# Patient Record
Sex: MALE | Race: WHITE | NOT HISPANIC OR LATINO | Employment: OTHER | URBAN - METROPOLITAN AREA
[De-identification: names, ages, dates, MRNs, and addresses within clinical notes are randomized per-mention and may not be internally consistent; named-entity substitution may affect disease eponyms.]

---

## 2017-03-16 ENCOUNTER — RECORDS - HEALTHEAST (OUTPATIENT)
Dept: LAB | Facility: CLINIC | Age: 57
End: 2017-03-16

## 2017-03-16 LAB
CHOLEST SERPL-MCNC: 144 MG/DL
FASTING STATUS PATIENT QL REPORTED: ABNORMAL
HDLC SERPL-MCNC: 30 MG/DL
LDLC SERPL CALC-MCNC: 88 MG/DL
TRIGL SERPL-MCNC: 130 MG/DL

## 2018-01-10 ENCOUNTER — RECORDS - HEALTHEAST (OUTPATIENT)
Dept: LAB | Facility: CLINIC | Age: 58
End: 2018-01-10

## 2018-01-10 LAB
IRON SATN MFR SERPL: 19 % (ref 20–50)
IRON SERPL-MCNC: 55 UG/DL (ref 42–175)
TIBC SERPL-MCNC: 294 UG/DL (ref 313–563)
TRANSFERRIN SERPL-MCNC: 236 MG/DL (ref 212–360)
VIT B12 SERPL-MCNC: 608 PG/ML (ref 213–816)

## 2018-01-11 LAB
BASOPHILS # BLD AUTO: 0 THOU/UL (ref 0–0.2)
BASOPHILS NFR BLD AUTO: 1 % (ref 0–2)
EOSINOPHIL # BLD AUTO: 0.3 THOU/UL (ref 0–0.4)
EOSINOPHIL NFR BLD AUTO: 5 % (ref 0–6)
ERYTHROCYTE [DISTWIDTH] IN BLOOD BY AUTOMATED COUNT: 12.1 % (ref 11–14.5)
HCT VFR BLD AUTO: 39.2 % (ref 40–54)
HGB BLD-MCNC: 13.2 G/DL (ref 14–18)
LAB AP CHARGES (HE HISTORICAL CONVERSION): NORMAL
LYMPHOCYTES # BLD AUTO: 1.3 THOU/UL (ref 0.8–4.4)
LYMPHOCYTES NFR BLD AUTO: 21 % (ref 20–40)
MCH RBC QN AUTO: 30.6 PG (ref 27–34)
MCHC RBC AUTO-ENTMCNC: 33.7 G/DL (ref 32–36)
MCV RBC AUTO: 91 FL (ref 80–100)
MONOCYTES # BLD AUTO: 0.8 THOU/UL (ref 0–0.9)
MONOCYTES NFR BLD AUTO: 13 % (ref 2–10)
NEUTROPHILS # BLD AUTO: 3.7 THOU/UL (ref 2–7.7)
NEUTROPHILS NFR BLD AUTO: 61 % (ref 50–70)
PATH REPORT.COMMENTS IMP SPEC: NORMAL
PATH REPORT.COMMENTS IMP SPEC: NORMAL
PATH REPORT.FINAL DX SPEC: NORMAL
PATH REPORT.MICROSCOPIC SPEC OTHER STN: ABNORMAL
PATH REPORT.MICROSCOPIC SPEC OTHER STN: NORMAL
PATH REPORT.RELEVANT HX SPEC: NORMAL
PLATELET # BLD AUTO: 238 THOU/UL (ref 140–440)
PMV BLD AUTO: 9.7 FL (ref 8.5–12.5)
RBC # BLD AUTO: 4.31 MILL/UL (ref 4.4–6.2)
WBC: 6.2 THOU/UL (ref 4–11)

## 2018-09-11 ENCOUNTER — RECORDS - HEALTHEAST (OUTPATIENT)
Dept: LAB | Facility: CLINIC | Age: 58
End: 2018-09-11

## 2018-09-11 LAB
ALBUMIN SERPL-MCNC: 4.2 G/DL (ref 3.5–5)
ALP SERPL-CCNC: 41 U/L (ref 45–120)
ALT SERPL W P-5'-P-CCNC: 15 U/L (ref 0–45)
ANION GAP SERPL CALCULATED.3IONS-SCNC: 8 MMOL/L (ref 5–18)
AST SERPL W P-5'-P-CCNC: 18 U/L (ref 0–40)
BILIRUB SERPL-MCNC: 0.4 MG/DL (ref 0–1)
BUN SERPL-MCNC: 10 MG/DL (ref 8–22)
CALCIUM SERPL-MCNC: 9.2 MG/DL (ref 8.5–10.5)
CHLORIDE BLD-SCNC: 104 MMOL/L (ref 98–107)
CO2 SERPL-SCNC: 26 MMOL/L (ref 22–31)
CREAT SERPL-MCNC: 0.97 MG/DL (ref 0.7–1.3)
GFR SERPL CREATININE-BSD FRML MDRD: >60 ML/MIN/1.73M2
GLUCOSE BLD-MCNC: 97 MG/DL (ref 70–125)
POTASSIUM BLD-SCNC: 4.8 MMOL/L (ref 3.5–5)
PROT SERPL-MCNC: 6.6 G/DL (ref 6–8)
PSA SERPL-MCNC: 0.3 NG/ML (ref 0–3.5)
SODIUM SERPL-SCNC: 138 MMOL/L (ref 136–145)

## 2019-09-25 ENCOUNTER — RECORDS - HEALTHEAST (OUTPATIENT)
Dept: LAB | Facility: CLINIC | Age: 59
End: 2019-09-25

## 2019-09-25 LAB
ALBUMIN SERPL-MCNC: 4 G/DL (ref 3.5–5)
ALP SERPL-CCNC: 65 U/L (ref 45–120)
ALT SERPL W P-5'-P-CCNC: 14 U/L (ref 0–45)
ANION GAP SERPL CALCULATED.3IONS-SCNC: 5 MMOL/L (ref 5–18)
AST SERPL W P-5'-P-CCNC: 17 U/L (ref 0–40)
BILIRUB SERPL-MCNC: 0.2 MG/DL (ref 0–1)
BUN SERPL-MCNC: 11 MG/DL (ref 8–22)
CALCIUM SERPL-MCNC: 9.8 MG/DL (ref 8.5–10.5)
CHLORIDE BLD-SCNC: 102 MMOL/L (ref 98–107)
CHOLEST SERPL-MCNC: 154 MG/DL
CO2 SERPL-SCNC: 29 MMOL/L (ref 22–31)
CREAT SERPL-MCNC: 0.93 MG/DL (ref 0.7–1.3)
FASTING STATUS PATIENT QL REPORTED: YES
GFR SERPL CREATININE-BSD FRML MDRD: >60 ML/MIN/1.73M2
GLUCOSE BLD-MCNC: 104 MG/DL (ref 70–125)
HDLC SERPL-MCNC: 35 MG/DL
LDLC SERPL CALC-MCNC: 95 MG/DL
POTASSIUM BLD-SCNC: 4.5 MMOL/L (ref 3.5–5)
PROT SERPL-MCNC: 6.4 G/DL (ref 6–8)
SODIUM SERPL-SCNC: 136 MMOL/L (ref 136–145)
TRIGL SERPL-MCNC: 118 MG/DL

## 2020-02-19 ENCOUNTER — AMBULATORY - HEALTHEAST (OUTPATIENT)
Dept: CARDIOLOGY | Facility: CLINIC | Age: 60
End: 2020-02-19

## 2020-09-15 ENCOUNTER — RECORDS - HEALTHEAST (OUTPATIENT)
Dept: LAB | Facility: CLINIC | Age: 60
End: 2020-09-15

## 2020-09-15 LAB
ALBUMIN SERPL-MCNC: 4.5 G/DL (ref 3.5–5)
ALP SERPL-CCNC: 57 U/L (ref 45–120)
ALT SERPL W P-5'-P-CCNC: 18 U/L (ref 0–45)
ANION GAP SERPL CALCULATED.3IONS-SCNC: 8 MMOL/L (ref 5–18)
AST SERPL W P-5'-P-CCNC: 18 U/L (ref 0–40)
BILIRUB SERPL-MCNC: 0.2 MG/DL (ref 0–1)
BUN SERPL-MCNC: 11 MG/DL (ref 8–22)
CALCIUM SERPL-MCNC: 9.4 MG/DL (ref 8.5–10.5)
CHLORIDE BLD-SCNC: 101 MMOL/L (ref 98–107)
CHOLEST SERPL-MCNC: 154 MG/DL
CO2 SERPL-SCNC: 28 MMOL/L (ref 22–31)
CREAT SERPL-MCNC: 1.06 MG/DL (ref 0.7–1.3)
FASTING STATUS PATIENT QL REPORTED: ABNORMAL
GFR SERPL CREATININE-BSD FRML MDRD: >60 ML/MIN/1.73M2
GLUCOSE BLD-MCNC: 75 MG/DL (ref 70–125)
HDLC SERPL-MCNC: 39 MG/DL
LDLC SERPL CALC-MCNC: 83 MG/DL
POTASSIUM BLD-SCNC: 4.7 MMOL/L (ref 3.5–5)
PROT SERPL-MCNC: 6.8 G/DL (ref 6–8)
SODIUM SERPL-SCNC: 137 MMOL/L (ref 136–145)
TRIGL SERPL-MCNC: 158 MG/DL

## 2021-03-04 ENCOUNTER — RECORDS - HEALTHEAST (OUTPATIENT)
Dept: ADMINISTRATIVE | Facility: OTHER | Age: 61
End: 2021-03-04

## 2021-04-12 ENCOUNTER — HOSPITAL ENCOUNTER (OUTPATIENT)
Dept: RESPIRATORY THERAPY | Facility: CLINIC | Age: 61
Discharge: HOME | End: 2021-04-12
Attending: FAMILY MEDICINE

## 2021-04-12 DIAGNOSIS — R06.02 SOB (SHORTNESS OF BREATH): ICD-10-CM

## 2021-04-12 LAB — HGB BLD-MCNC: 13.6 G/DL (ref 14–18)

## 2021-05-07 ENCOUNTER — RECORDS - HEALTHEAST (OUTPATIENT)
Dept: ADMINISTRATIVE | Facility: OTHER | Age: 61
End: 2021-05-07

## 2021-05-20 ENCOUNTER — RECORDS - HEALTHEAST (OUTPATIENT)
Dept: ADMINISTRATIVE | Facility: OTHER | Age: 61
End: 2021-05-20

## 2021-05-21 ENCOUNTER — RECORDS - HEALTHEAST (OUTPATIENT)
Dept: ADMINISTRATIVE | Facility: OTHER | Age: 61
End: 2021-05-21

## 2021-05-25 ENCOUNTER — RECORDS - HEALTHEAST (OUTPATIENT)
Dept: ADMINISTRATIVE | Facility: CLINIC | Age: 61
End: 2021-05-25

## 2021-05-26 ENCOUNTER — RECORDS - HEALTHEAST (OUTPATIENT)
Dept: ADMINISTRATIVE | Facility: CLINIC | Age: 61
End: 2021-05-26

## 2021-05-27 ENCOUNTER — RECORDS - HEALTHEAST (OUTPATIENT)
Dept: ADMINISTRATIVE | Facility: CLINIC | Age: 61
End: 2021-05-27

## 2021-05-27 ENCOUNTER — OFFICE VISIT - HEALTHEAST (OUTPATIENT)
Dept: PULMONOLOGY | Facility: OTHER | Age: 61
End: 2021-05-27

## 2021-05-27 DIAGNOSIS — J44.9 CHRONIC OBSTRUCTIVE PULMONARY DISEASE, UNSPECIFIED COPD TYPE (H): ICD-10-CM

## 2021-05-27 DIAGNOSIS — Z12.2 ENCOUNTER FOR SCREENING FOR LUNG CANCER: ICD-10-CM

## 2021-05-27 ASSESSMENT — MIFFLIN-ST. JEOR: SCORE: 1848.18

## 2021-05-28 ENCOUNTER — RECORDS - HEALTHEAST (OUTPATIENT)
Dept: ADMINISTRATIVE | Facility: CLINIC | Age: 61
End: 2021-05-28

## 2021-05-29 ENCOUNTER — RECORDS - HEALTHEAST (OUTPATIENT)
Dept: ADMINISTRATIVE | Facility: CLINIC | Age: 61
End: 2021-05-29

## 2021-05-30 ENCOUNTER — RECORDS - HEALTHEAST (OUTPATIENT)
Dept: ADMINISTRATIVE | Facility: CLINIC | Age: 61
End: 2021-05-30

## 2021-05-31 ENCOUNTER — RECORDS - HEALTHEAST (OUTPATIENT)
Dept: ADMINISTRATIVE | Facility: CLINIC | Age: 61
End: 2021-05-31

## 2021-06-01 ENCOUNTER — RECORDS - HEALTHEAST (OUTPATIENT)
Dept: ADMINISTRATIVE | Facility: CLINIC | Age: 61
End: 2021-06-01

## 2021-06-06 NOTE — PROGRESS NOTES
I was asked by Dr. Funez to review EKGs with consideration of Brugada syndrome  Abnormal EKG since at least 2002 with some ST elevation in V1 consistent with Brugada syndrome  EKG pattern is not changed much over the years and is seen on stress scan  Stress test shows no ischemia or infarct with preserved ejection fraction without arrhythmia   unless patient has a history of arrhythmic like syncope or a worrisome family history of early sudden death would not be very concerned about this EKG pattern

## 2021-06-14 ENCOUNTER — HOSPITAL ENCOUNTER (OUTPATIENT)
Dept: CT IMAGING | Facility: HOSPITAL | Age: 61
Discharge: HOME | End: 2021-06-14
Attending: INTERNAL MEDICINE
Payer: COMMERCIAL

## 2021-06-14 DIAGNOSIS — Z12.2 ENCOUNTER FOR SCREENING FOR LUNG CANCER: ICD-10-CM

## 2021-06-16 NOTE — PROGRESS NOTES
RESPIRATORY CARE NOTE     Patient Name: Alex Frost  Today's Date: 4/12/2021     Complete PFT done. Pt performed tests with good effort. Alb neb given.  Test results meet ATS criteria. Results scanned into epic. Pt left in no distress.       Shannon Teixeira, LRT

## 2021-06-17 ENCOUNTER — COMMUNICATION - HEALTHEAST (OUTPATIENT)
Dept: PULMONOLOGY | Facility: OTHER | Age: 61
End: 2021-06-17

## 2021-06-26 NOTE — TELEPHONE ENCOUNTER
Telephone call to patient to relay LDCT results per Dr. Whitfield,   There are no findings concerning for lung cancer. We can continue to discuss annual screening CT on the next follow up in a year   Provided clinic call back number for any further needs or questions.

## 2021-06-26 NOTE — PATIENT INSTRUCTIONS - HE
Plan:    Continue Trelegy inhaler daily. Remember to gargle & rinse your mouth and throat after using it.    Try using your albuterol inhaler about 10 minutes before Trelegy in the morning to see if it helps a bit more with your morning shortness of breath.    We will get a lung CT scan to screen for lung cancer.     You should follow up in 12 months.     If you have any questions or concerns, please, call our clinic at [phone].

## 2021-07-04 NOTE — PROGRESS NOTES
"Progress Notes by Zahra Whitfield MD at 5/27/2021  9:00 AM     Author: Zahra Whitfield MD Service: -- Author Type: Physician    Filed: 5/27/2021  9:41 AM Encounter Date: 5/27/2021 Status: Signed    : Zahra Whitfield MD (Physician)       Pulmonary Clinic Outpatient Consultation  5/26/2021     Assessment and Plan:   #. COPD w/ moderately severe airflow obstruction & air trapping. Started on appropriate triple-inhaler maintenance regimen.  #. Tobacco use disorder, ongoing.  #. Lung cancer screening -- appropriate.      Continue w/ Trelegy    Advised to try using albuterol before Trelegy in the morning to see if it helps w/ his morning dyspnea    Discussed the effect of opioids on breathing & air hunger    Discussed smoking cessation    Reviewed PFTs    Lung cancer screening CT ordered    RTC 12 months.    Zahra Whitfield MD  Pulmonary and Critical Care   ______________________________________________________________________________    CC: dyspnea    HPI:   Alex Frost is a 60 y.o. smoker with history of MDD, chronic pain, GERD, SUDARSHAN, presenting today for evaluation of dyspnea. PFT consistent w/ moderately severe obstruction w/ air-trapping. Current inhaler regimen -- albuterol & just started Trelegy.    Reports that he can't breathe in the morning until he he \"gets enough medications [opioid] in\". He has been taking Trelegy for about 2 weeks. No cough, no wheezing. He has dyspnea w/ some moderate activity such as going up 1-2 flights of stairs -- this has improved since starting Trelegy. He is slower ambulating on a flat surface as well, but there is also back pain that is contributing. He has no nocturnal breathing issues. He gets annual bout of bronchitis in the fall -- he recovers w/in a couple of days of antibiotics. No h/o hemoptysis. He has seasonal allergies (spring primarily); he sometimes takes Flonase. He has 3 cats & these do not contribute to allergy " "symptoms or dyspnea. No childhood respiratory issues.     No occasionally smokes a cigar nowadays. He has 30 pack year history w/ cigarettes. His sister had a brain tumor. His father  w/ lung cancer at age 77. His mother  w/ emphysema.     Lung Cancer Screening criteria:    Age 55-80    30+ pack-year smoking history    Smoking within past 15 years    No severe medical issues that would cause death before death due to lung cancer that may be detected    Background: Of 100 patients who get screening, 25 will have a positive scan. Of those 25, only 1 will have cancer.  Overdiagnosis is estimated at 10% of patients-- they would not have been detected in the patient's lifetime without screening. Less than 1% of patients likely had death related to radiation exposure increase.   Average low-dose CT associated with 0.61 to 1.5 mSv. Annual background radiation exposure in the [place] averages 2.4 mSv; mammogram is 0.7mSv.    Benefits: reduction in risk of death from lung cancer. The number needed to treat is 320 (for every 320 patients who undergo screening, 1 patient will have a benefit in mortality from early detection from the screening).    Screening may lead to detection of nodules that may or may not be cancer.  Some of these nodules would need to be monitored with serial/repeat CT scans.  Larger nodules may need to be biopsied AND/OR resected as part of treatment.    ROS: 10 point ROS reviewed and noted to be negative w/ exceptions as detailed in the HPI.    Physical Exam:  /68   Pulse 64   Ht 5' 11\" (1.803 m)   Wt (!) 225 lb 1.6 oz (102.1 kg)   SpO2 97% Comment: RA  BMI 31.40 kg/m    Gen: alert, oriented, no distress  HEENT: masked, PERRL; no stridor  CV: RRR, no M/G/R  Resp: equal bilateral air entry, breath sounds clear throughout, no focal crackles or wheezes; able to converse in full sentences w/ no respiratory distress  Abd: soft, nontender, no palpable organomegaly  Skin: no apparent " rashes  Ext: no cyanosis, no clubbing, no BLE edema  Neuro: alert, nonfocal    Labs: personally reviewed in the EMR.  Imaging studies: personally reviewed and interpreted. Below are the Radiology interpretations.  #. Cxr, 2/18/201: negative chest    PFT's (4/12/21): moderately severe obstruction without BD response, no hyper-inflation, (+) air trapping, & normal diffusion capacity.       PMH:  Patient Active Problem List    Diagnosis Date Noted   ? Abnormal reflex 05/21/2021   ? Disturbance of skin sensation 05/21/2021   ? Drug induced constipation 05/21/2021   ? Long term (current) use of opiate analgesic 05/21/2021   ? Male hypogonadism 05/21/2021   ? Other myositis, unspecified site 05/21/2021   ? Recurrent major depression in full remission (H) 05/21/2021   ? Smoking 05/21/2021   ? Trochanteric bursitis, right hip 05/21/2021   ? Hyponatremia 02/19/2020   ? Abnormal electrocardiogram 02/19/2020   ? Gastro-esophageal reflux disease without esophagitis 02/18/2020   ? Low back pain 02/18/2020   ? Mixed hyperlipidemia 02/18/2020   ? Chest pressure 02/18/2020   ? Obstructive sleep apnea 11/22/2016   ? Abnormal chest CT 11/24/2015   ? Major depressive disorder, recurrent, severe with psychotic features (H) 10/07/2015   ? Anxiety disorder 10/07/2015   ? Benign essential tremor 10/07/2015   ? Episodic mood disorder (H) 10/07/2015   ? Essential and other specified forms of tremor 05/27/2015   ? Hallucinations 05/27/2015   ? Muscle pain 05/27/2015   ? Sensory disturbance 05/27/2015   ? Tired 05/27/2015   ? Arthropathy involving other sites 01/08/2015   ? Encounter for long-term (current) use of other medications 11/25/2014   ? Major depressive disorder, single episode 05/21/2014   ? Migraines 06/03/2010   ? DDD (degenerative disc disease), lumbar 07/29/2009   ? Arthropathy of lumbar facet joint 07/29/2009   ? Intra-abdominal hernia 06/30/2009   ? Benign essential hypertension 06/29/2009   ? Brachial neuritis or radiculitis  06/29/2009   ? Trigeminal neuralgia 06/29/2009   ? Impacted cerumen 06/29/2009   ? Insomnia 06/29/2009   ? Malaise and fatigue 06/29/2009   ? Plantar fascial fibromatosis 06/29/2009   ? Rosacea 06/29/2009       PSH:  No past surgical history on file.    Family HX: family history is not on file.    Social Hx:  reports that he has been smoking cigars. He has a 0.13 pack-year smoking history. He has quit using smokeless tobacco. He reports previous alcohol use.     Current Meds:  Current Outpatient Medications   Medication Sig Dispense Refill   ? albuterol (PROAIR HFA;PROVENTIL HFA;VENTOLIN HFA) 90 mcg/actuation inhaler INHALE 2 PUFFS PO QID     ? amoxicillin-clavulanate (AUGMENTIN) 875-125 mg per tablet TK 1 T PO Q 12 H FOR 7 DAYS     ? atorvastatin (LIPITOR) 20 MG tablet Take 20 mg by mouth at bedtime.   0   ? candesartan (ATACAND) 32 MG tablet Take 16 mg by mouth daily.  1   ? cholecalciferol, vitamin D3, 1,000 unit (25 mcg) tablet Take 2,000 Units by mouth daily.     ? clonazePAM (KLONOPIN) 1 MG tablet Take 1 mg by mouth 2 (two) times a day.   1   ? DOCOSAHEXANOIC ACID/EPA (FISH OIL ORAL) Take 1 capsule by mouth daily. Med Name: Gurjit Red     ? famotidine (PEPCID) 20 MG tablet Take 20 mg by mouth 2 (two) times a day.     ? lubiprostone (AMITIZA) 24 MCG capsule Take 24 mcg by mouth 2 (two) times a day with meals.     ? methocarbamoL (ROBAXIN) 500 MG tablet Take 500 mg by mouth 3 (three) times a day as needed.     ? morphine (MS CONTIN) 30 MG 12 hr tablet Take 30 mg by mouth 3 (three) times a day.     ? MOVANTIK 25 mg Tab tablet TAKE 1 TABLET BY MOUTH EVERY DAY IN THE MORNING     ? multivitamin therapeutic tablet Take 1 tablet by mouth daily.     ? olopatadine 0.6 % Spry 2 sprays.     ? oxyCODONE (ROXICODONE) 15 MG immediate release tablet Take 15 mg by mouth every 4 (four) hours as needed. Max 5 tablets per day  0   ? PARoxetine (PAXIL-CR) 12.5 MG 24 hr tablet Take 12.5 mg by mouth every morning. Take with 37.5mg  tablet for total of 50mg daily     ? PARoxetine (PAXIL-CR) 37.5 MG 24 hr tablet Take 37.5 mg by mouth daily. Take with 12.5mg tablet for total of 50mg daily  3   ? propranolol (INDERAL) 10 MG tablet Take 10 mg by mouth 2 (two) times a day.   3   ? risperiDONE (RISPERDAL) 1 MG tablet Take 1 mg by mouth at bedtime.  3     No current facility-administered medications for this visit.        Allergies:  Allergies   Allergen Reactions   ? Amitriptyline Other (See Comments)     tachycardia   ? Quinolones Other (See Comments)     irreg heartbeat   ? Lyrica [Pregabalin] Rash   ? Neurontin [Gabapentin] Rash

## 2021-07-06 VITALS
WEIGHT: 225.1 LBS | DIASTOLIC BLOOD PRESSURE: 68 MMHG | HEIGHT: 71 IN | OXYGEN SATURATION: 97 % | SYSTOLIC BLOOD PRESSURE: 112 MMHG | HEART RATE: 64 BPM | BODY MASS INDEX: 31.51 KG/M2

## 2021-11-10 ENCOUNTER — LAB REQUISITION (OUTPATIENT)
Dept: LAB | Facility: CLINIC | Age: 61
End: 2021-11-10

## 2021-11-10 DIAGNOSIS — E78.2 MIXED HYPERLIPIDEMIA: ICD-10-CM

## 2021-11-10 DIAGNOSIS — Z12.5 ENCOUNTER FOR SCREENING FOR MALIGNANT NEOPLASM OF PROSTATE: ICD-10-CM

## 2021-11-10 LAB
ALBUMIN SERPL-MCNC: 4.3 G/DL (ref 3.5–5)
ALP SERPL-CCNC: 74 U/L (ref 45–120)
ALT SERPL W P-5'-P-CCNC: 23 U/L (ref 0–45)
ANION GAP SERPL CALCULATED.3IONS-SCNC: 11 MMOL/L (ref 5–18)
AST SERPL W P-5'-P-CCNC: 21 U/L (ref 0–40)
BILIRUB SERPL-MCNC: 0.4 MG/DL (ref 0–1)
BUN SERPL-MCNC: 8 MG/DL (ref 8–22)
CALCIUM SERPL-MCNC: 9.6 MG/DL (ref 8.5–10.5)
CHLORIDE BLD-SCNC: 104 MMOL/L (ref 98–107)
CHOLEST SERPL-MCNC: 168 MG/DL
CO2 SERPL-SCNC: 24 MMOL/L (ref 22–31)
CREAT SERPL-MCNC: 0.83 MG/DL (ref 0.7–1.3)
FASTING STATUS PATIENT QL REPORTED: NORMAL
GFR SERPL CREATININE-BSD FRML MDRD: >90 ML/MIN/1.73M2
GLUCOSE BLD-MCNC: 98 MG/DL (ref 70–125)
HDLC SERPL-MCNC: 42 MG/DL
LDLC SERPL CALC-MCNC: 107 MG/DL
POTASSIUM BLD-SCNC: 4.7 MMOL/L (ref 3.5–5)
PROT SERPL-MCNC: 6.9 G/DL (ref 6–8)
PSA SERPL-MCNC: 0.17 UG/L (ref 0–4.5)
SODIUM SERPL-SCNC: 139 MMOL/L (ref 136–145)
TRIGL SERPL-MCNC: 93 MG/DL

## 2021-11-10 PROCEDURE — G0103 PSA SCREENING: HCPCS | Performed by: FAMILY MEDICINE

## 2021-11-10 PROCEDURE — 80061 LIPID PANEL: CPT | Performed by: FAMILY MEDICINE

## 2021-11-10 PROCEDURE — 82040 ASSAY OF SERUM ALBUMIN: CPT | Performed by: FAMILY MEDICINE

## 2022-03-19 DIAGNOSIS — J44.9 COPD (CHRONIC OBSTRUCTIVE PULMONARY DISEASE) (H): Primary | ICD-10-CM

## 2022-07-14 PROBLEM — E87.1 HYPONATREMIA: Status: ACTIVE | Noted: 2020-02-19

## 2022-07-14 PROBLEM — Z79.891 LONG TERM (CURRENT) USE OF OPIATE ANALGESIC: Status: ACTIVE | Noted: 2021-05-21

## 2022-07-14 PROBLEM — E29.1 MALE HYPOGONADISM: Status: ACTIVE | Noted: 2021-05-21

## 2022-07-14 PROBLEM — F17.200 SMOKING: Status: ACTIVE | Noted: 2021-05-21

## 2022-07-14 PROBLEM — R20.9 DISTURBANCE OF SKIN SENSATION: Status: ACTIVE | Noted: 2021-05-21

## 2022-07-14 PROBLEM — F33.42 RECURRENT MAJOR DEPRESSION IN FULL REMISSION (H): Status: ACTIVE | Noted: 2021-05-21

## 2022-07-14 PROBLEM — M60.80: Status: ACTIVE | Noted: 2021-05-21

## 2022-07-14 PROBLEM — E78.2 MIXED HYPERLIPIDEMIA: Status: ACTIVE | Noted: 2020-02-18

## 2022-07-14 PROBLEM — M70.61 TROCHANTERIC BURSITIS, RIGHT HIP: Status: ACTIVE | Noted: 2021-05-21

## 2022-07-14 PROBLEM — M54.50 LOW BACK PAIN: Status: ACTIVE | Noted: 2020-02-18

## 2022-07-14 PROBLEM — K59.03 DRUG INDUCED CONSTIPATION: Status: ACTIVE | Noted: 2021-05-21

## 2022-07-14 PROBLEM — K21.9 GASTRO-ESOPHAGEAL REFLUX DISEASE WITHOUT ESOPHAGITIS: Status: ACTIVE | Noted: 2020-02-18

## 2022-07-14 PROBLEM — R07.89 CHEST PRESSURE: Status: ACTIVE | Noted: 2020-02-18

## 2022-07-14 PROBLEM — R94.31 ABNORMAL ELECTROCARDIOGRAM: Status: ACTIVE | Noted: 2020-02-19

## 2022-07-14 PROBLEM — R29.2 ABNORMAL REFLEX: Status: ACTIVE | Noted: 2021-05-21

## 2022-07-15 NOTE — PROGRESS NOTES
"Pulmonary Clinic Outpatient Follow-Up  7/19/2022     Assessment and Plan:   #. COPD, moderately severe airflow obstruction and air trapping. Doing well on Trelegy.  #. Lung cancer screening. Last done in 06/2021 -- declined testing this year, OK to readdress in 2023.   #. Tobacco use disorder, in remission. Chantix has been helpful. Smoked from 1980 to 2022.       Continue with daily Trelegy    Provided with an Rx for a rescue albuterol    Action Plan -- prednisone 40 mg daily x 5 days with a Z-pack. May repeat x1, but if fails 2 action plan activations back-to-back, will need to go the the ED.    RTC: 12 months    Zahra Whitfield MD  Pulmonary and Critical Care   ______________________________________________________________________________    CC: follow up    HPI:   Alex Frost is a 61 year old smoker with history of COPD, MDD, chronic pain, GERD, SUDARSHAN, presenting today for follow up of his COPD.  Last seen 5/26/2021; continued on Trelegy, discussed smoking cessation, ordered lung cancer screening. LDCT 6/14/2021 lung RADS category 1 (negative).    Reports that he is doing well. He is using daily Trelegy and feels that it has definitely been helpful for his breathing. Not affected by weather changes, but feels that changes in the barometric pressure (low) have a negative impact on his breathing. No ED visits or hospitalizations for his breathing is the last year. He gets some transient worsening in his breathing lasting for 15-30 minutes that he calls \"flares\" -- he either takes a drive to take his mind of things or just \"rides it out\". He does not have an albuterol inhaler currently -- not sure why.     Lung Cancer Screening criteria:    Age 55-80    30+ pack-year smoking history    Smoking within past 15 years    No severe medical issues that would cause death before death due to lung cancer that may be detected    Background: Of 100 patients who get screening, 25 will have a positive scan. Of those 25, only 1 will " have cancer.  Overdiagnosis is estimated at 10% of patients-- they would not have been detected in the patient's lifetime without screening. Less than 1% of patients likely had death related to radiation exposure increase.   Average low-dose CT associated with 0.61 to 1.5 mSv. Annual background radiation exposure in the [place] averages 2.4 mSv; mammogram is 0.7mSv.    Benefits: reduction in risk of death from lung cancer. The number needed to treat is 320 (for every 320 patients who undergo screening, 1 patient will have a benefit in mortality from early detection from the screening).    Screening may lead to detection of nodules that may or may not be cancer.  Some of these nodules would need to be monitored with serial/repeat CT scans.  Larger nodules may need to be biopsied AND/OR resected as part of treatment.    REVIEW OF SYSTEMS: 10-point ROS was negative with exceptions as detailed in the HPI.    Physical Exam:  /64 (BP Location: Left arm, Patient Position: Chair, Cuff Size: Adult Regular)   Pulse 53   Wt 97.2 kg (214 lb 4.8 oz)   SpO2 96%   BMI 29.89 kg/m    Gen: alert, oriented, no distress  HEENT: masked, PERLL; no stridor  CV: RRR, no M/G/R  Resp: equal bilateral air entry, breath sounds clear throughout, no focal crackles or wheezes. Talking in full sentences w/ no respiratory distress  Abd: deferred  Skin: no apparent rashes  Ext: no cyanosis, clubbing or edema  Neuro: alert, nonfocal    Labs: personally reviewed & interpreted in EMR.   Imaging & Procedures: personally reviewed & interpreted, including formal Radiology reports in the EMR.  PFT's (4/12/21): moderately severe obstruction without BD response, no hyper-inflation, (+) air trapping, & normal diffusion capacity.          MEDICAL HISTORY:  has a past medical history of Anxiety, Cigar smoker, GERD (gastroesophageal reflux disease), Hyperlipidemia, and Hypertension.  SURGICAL HISTORY:  has a past surgical history that includes IR  Lumbar Epidural Steroid Injection (10/9/2002).  SOCIAL HISTORY:  reports that he has quit smoking. His smoking use included cigars. He has a 0.13 pack-year smoking history. He has quit using smokeless tobacco. He reports previous alcohol use.  FAMILY HISTORY: family history is not on file.    MEDICATIONS: personally reviewed, including EMR/Care Everywhere. Pertinent information noted & updated.   ALLERGIES:   Allergies   Allergen Reactions     Amitriptyline      Reaction: Racing heart (tachycardia)      Avelox      Reaction: Anxiety     Lisinopril      Reaction: Facial Parathesia (numbness)     Lyrica [Pregabalin]      Reaction: Facial Parathesia (numbness)      Neurontin [Gabapentin]      Reaction: Rash and itching     Lung Cancer Screening Shared Decision Making Visit     Alex Frost is eligible for lung cancer screening on the basis of:   has not not experienced symptoms suggestive of lung cancer.   born on 1960, 61 year old years old.   smoked for a total of 30 pack-years   has quit smoking w/in the last year    I have discussed with patient the risks and benefits of screening for lung cancer with low-dose CT.     The risks include:   radiation exposure    false positives     over-diagnosis    The benefit of early detection of lung cancer is contingent upon adherence to annual screening or more frequent follow up if indicated.     Furthermore, reaping the benefits of screening requires Alex Frost to be willing and able to undergo diagnostic procedures, if indicated.     We did discuss that the only way to prevent lung cancer is to not smoke. Smoking cessation assistance was offered.

## 2022-07-19 ENCOUNTER — OFFICE VISIT (OUTPATIENT)
Dept: PULMONOLOGY | Facility: OTHER | Age: 62
End: 2022-07-19
Payer: COMMERCIAL

## 2022-07-19 VITALS
HEART RATE: 53 BPM | BODY MASS INDEX: 29.89 KG/M2 | OXYGEN SATURATION: 96 % | WEIGHT: 214.3 LBS | SYSTOLIC BLOOD PRESSURE: 114 MMHG | DIASTOLIC BLOOD PRESSURE: 64 MMHG

## 2022-07-19 DIAGNOSIS — J44.9 CHRONIC OBSTRUCTIVE PULMONARY DISEASE, UNSPECIFIED COPD TYPE (H): Primary | ICD-10-CM

## 2022-07-19 PROCEDURE — 99213 OFFICE O/P EST LOW 20 MIN: CPT | Performed by: INTERNAL MEDICINE

## 2022-07-19 RX ORDER — ALBUTEROL SULFATE 90 UG/1
2 AEROSOL, METERED RESPIRATORY (INHALATION) EVERY 6 HOURS PRN
Qty: 18 G | Refills: 3 | Status: SHIPPED | OUTPATIENT
Start: 2022-07-19 | End: 2022-10-24

## 2022-07-19 NOTE — PATIENT INSTRUCTIONS
Plan:  Continue with your daily Trelegy  When you are having an attack or flare from your COPD, I want you to use a rescue ALBUTEROL inhaler to help you catch your breath sooner. You can use this inhaler (1-2 puffs) up to every 6 hours, if you need that. The effects of this medication do not last long, but can be very helpful during flares    You should follow up in 12 months -- we will discuss lung cancer screening CT scan at that time.  Congratulations on quitting smoking!     If you have any questions or concerns, please, call our clinic at [phone].

## 2022-10-24 DIAGNOSIS — J44.9 CHRONIC OBSTRUCTIVE PULMONARY DISEASE, UNSPECIFIED COPD TYPE (H): ICD-10-CM

## 2022-10-24 RX ORDER — ALBUTEROL SULFATE 90 UG/1
AEROSOL, METERED RESPIRATORY (INHALATION)
Qty: 8.5 G | Refills: 11 | Status: SHIPPED | OUTPATIENT
Start: 2022-10-24 | End: 2024-03-12

## 2022-11-02 ENCOUNTER — LAB REQUISITION (OUTPATIENT)
Dept: LAB | Facility: CLINIC | Age: 62
End: 2022-11-02

## 2022-11-02 DIAGNOSIS — Z12.5 ENCOUNTER FOR SCREENING FOR MALIGNANT NEOPLASM OF PROSTATE: ICD-10-CM

## 2022-11-02 DIAGNOSIS — E78.2 MIXED HYPERLIPIDEMIA: ICD-10-CM

## 2022-11-02 LAB
ALBUMIN SERPL BCG-MCNC: 4.7 G/DL (ref 3.5–5.2)
ALP SERPL-CCNC: 61 U/L (ref 40–129)
ALT SERPL W P-5'-P-CCNC: 18 U/L (ref 10–50)
ANION GAP SERPL CALCULATED.3IONS-SCNC: 13 MMOL/L (ref 7–15)
AST SERPL W P-5'-P-CCNC: 20 U/L (ref 10–50)
BILIRUB SERPL-MCNC: 0.3 MG/DL
BUN SERPL-MCNC: 10 MG/DL (ref 8–23)
CALCIUM SERPL-MCNC: 9.9 MG/DL (ref 8.8–10.2)
CHLORIDE SERPL-SCNC: 100 MMOL/L (ref 98–107)
CHOLEST SERPL-MCNC: 163 MG/DL
CREAT SERPL-MCNC: 0.97 MG/DL (ref 0.67–1.17)
DEPRECATED HCO3 PLAS-SCNC: 27 MMOL/L (ref 22–29)
GFR SERPL CREATININE-BSD FRML MDRD: 88 ML/MIN/1.73M2
GLUCOSE SERPL-MCNC: 94 MG/DL (ref 70–99)
HDLC SERPL-MCNC: 46 MG/DL
LDLC SERPL CALC-MCNC: 101 MG/DL
NONHDLC SERPL-MCNC: 117 MG/DL
POTASSIUM SERPL-SCNC: 4.5 MMOL/L (ref 3.4–5.3)
PROT SERPL-MCNC: 6.6 G/DL (ref 6.4–8.3)
PSA SERPL-MCNC: 0.32 NG/ML (ref 0–4.5)
SODIUM SERPL-SCNC: 140 MMOL/L (ref 136–145)
TRIGL SERPL-MCNC: 80 MG/DL

## 2022-11-02 PROCEDURE — G0103 PSA SCREENING: HCPCS | Performed by: FAMILY MEDICINE

## 2022-11-02 PROCEDURE — 80061 LIPID PANEL: CPT | Performed by: FAMILY MEDICINE

## 2022-11-02 PROCEDURE — 80053 COMPREHEN METABOLIC PANEL: CPT | Performed by: FAMILY MEDICINE

## 2023-02-21 DIAGNOSIS — J44.9 CHRONIC OBSTRUCTIVE PULMONARY DISEASE, UNSPECIFIED COPD TYPE (H): ICD-10-CM

## 2023-02-21 RX ORDER — FLUTICASONE FUROATE, UMECLIDINIUM BROMIDE AND VILANTEROL TRIFENATATE 100; 62.5; 25 UG/1; UG/1; UG/1
POWDER RESPIRATORY (INHALATION)
Qty: 60 EACH | Refills: 11 | Status: SHIPPED | OUTPATIENT
Start: 2023-02-21 | End: 2024-03-11

## 2023-03-14 ENCOUNTER — LAB REQUISITION (OUTPATIENT)
Dept: LAB | Facility: CLINIC | Age: 63
End: 2023-03-14

## 2023-03-14 DIAGNOSIS — J02.9 ACUTE PHARYNGITIS, UNSPECIFIED: ICD-10-CM

## 2023-03-14 PROCEDURE — 87081 CULTURE SCREEN ONLY: CPT | Performed by: PHYSICIAN ASSISTANT

## 2023-03-16 LAB — BACTERIA SPEC CULT: NORMAL

## 2023-04-05 ENCOUNTER — HOSPITAL ENCOUNTER (OUTPATIENT)
Dept: MRI IMAGING | Facility: HOSPITAL | Age: 63
Discharge: HOME | End: 2023-04-05
Attending: FAMILY MEDICINE | Admitting: FAMILY MEDICINE
Payer: COMMERCIAL

## 2023-04-05 DIAGNOSIS — M54.2 CERVICAL SPINE PAIN: ICD-10-CM

## 2023-04-05 PROCEDURE — 72141 MRI NECK SPINE W/O DYE: CPT

## 2023-05-30 ENCOUNTER — HOSPITAL ENCOUNTER (OUTPATIENT)
Facility: CLINIC | Age: 63
End: 2023-05-30
Attending: ORTHOPAEDIC SURGERY | Admitting: ORTHOPAEDIC SURGERY
Payer: COMMERCIAL

## 2023-07-05 ENCOUNTER — OFFICE VISIT (OUTPATIENT)
Dept: PULMONOLOGY | Facility: CLINIC | Age: 63
End: 2023-07-05
Payer: COMMERCIAL

## 2023-07-05 VITALS
BODY MASS INDEX: 32.18 KG/M2 | WEIGHT: 230.7 LBS | SYSTOLIC BLOOD PRESSURE: 112 MMHG | HEART RATE: 47 BPM | DIASTOLIC BLOOD PRESSURE: 66 MMHG | OXYGEN SATURATION: 97 %

## 2023-07-05 DIAGNOSIS — Z12.2 ENCOUNTER FOR SCREENING FOR LUNG CANCER: ICD-10-CM

## 2023-07-05 DIAGNOSIS — R06.00 DYSPNEA, UNSPECIFIED TYPE: ICD-10-CM

## 2023-07-05 DIAGNOSIS — F17.200 TOBACCO USE DISORDER: ICD-10-CM

## 2023-07-05 DIAGNOSIS — J44.9 CHRONIC OBSTRUCTIVE PULMONARY DISEASE, UNSPECIFIED COPD TYPE (H): Primary | ICD-10-CM

## 2023-07-05 PROCEDURE — 99214 OFFICE O/P EST MOD 30 MIN: CPT | Performed by: INTERNAL MEDICINE

## 2023-07-05 RX ORDER — MORPHINE SULFATE 30 MG/1
30 CAPSULE, EXTENDED RELEASE ORAL DAILY
COMMUNITY
End: 2023-09-22

## 2023-07-05 NOTE — PATIENT INSTRUCTIONS
Continue TRELEGY, once a day, rinse your mouth after each use  Albuterol HFA two puffs every 6 hours as needed, use it before activities   Avoid eating close to bedtime  Raise the head of the bed  Pepcid twice a day   Referral to pulmonary rehab  Low dose chest CT scan  Follow up in 6 months

## 2023-07-13 ENCOUNTER — LAB REQUISITION (OUTPATIENT)
Dept: LAB | Facility: CLINIC | Age: 63
End: 2023-07-13

## 2023-07-13 DIAGNOSIS — I10 ESSENTIAL (PRIMARY) HYPERTENSION: ICD-10-CM

## 2023-07-13 LAB
ALBUMIN SERPL BCG-MCNC: 4.7 G/DL (ref 3.5–5.2)
ALP SERPL-CCNC: 55 U/L (ref 40–129)
ALT SERPL W P-5'-P-CCNC: 18 U/L (ref 0–70)
ANION GAP SERPL CALCULATED.3IONS-SCNC: 10 MMOL/L (ref 7–15)
AST SERPL W P-5'-P-CCNC: 20 U/L (ref 0–45)
BILIRUB SERPL-MCNC: 0.2 MG/DL
BUN SERPL-MCNC: 10.7 MG/DL (ref 8–23)
CALCIUM SERPL-MCNC: 9.3 MG/DL (ref 8.8–10.2)
CHLORIDE SERPL-SCNC: 101 MMOL/L (ref 98–107)
CREAT SERPL-MCNC: 1.3 MG/DL (ref 0.67–1.17)
DEPRECATED HCO3 PLAS-SCNC: 28 MMOL/L (ref 22–29)
GFR SERPL CREATININE-BSD FRML MDRD: 62 ML/MIN/1.73M2
GLUCOSE SERPL-MCNC: 107 MG/DL (ref 70–99)
POTASSIUM SERPL-SCNC: 4.7 MMOL/L (ref 3.4–5.3)
PROT SERPL-MCNC: 6.5 G/DL (ref 6.4–8.3)
SODIUM SERPL-SCNC: 139 MMOL/L (ref 136–145)

## 2023-07-13 PROCEDURE — 80053 COMPREHEN METABOLIC PANEL: CPT | Performed by: FAMILY MEDICINE

## 2023-07-13 NOTE — PROGRESS NOTES
PULMONARY OUTPATIENT FOLLOW UP NOTE        Assessment:      1. COPD  Significant tobacco use.   Chest CT scan showed foci of apical pleural parenchymal scarring. Upper zone predominant paraseptal emphysema   PFTs showed a moderate obstructive lung disease, air trapping, normal diffusion capacity   Continue ICS/LABA/LAMA. Albuterol HFA as needed  Referral to pulmonary rehabilitation  2. Lung cancer screening  Significant tobacco use.  Chest CT scan done on June 2015 and June 2021 were reviewed. No significant changes.   Plan for follow up low dose CT scan prior to next visit   3. GERD  Avoid eating close to bedtime  Raise the head of the bed  Pepcid twice a day   4. Obesity Body mass index is 32.18 kg/m .     Plan:      1. Continue TRELEGY, once a day, rinse your mouth after each use  2. Albuterol HFA two puffs every 6 hours as needed, use it before activities   3. Avoid eating close to bedtime  4. Raise the head of the bed  5. Pepcid twice a day   6. Referral to pulmonary rehab  7. Low dose chest CT scan  8. Follow up in 6 months         Elan López  Pulmonary / Critical Care  July 5, 2023        CC:     Chief Complaint   Patient presents with     Follow Up     COPD       HPI:      Alex Frost is a 62 year old male who presents for follow up appointment.  Patient has history of HTN, COPD, GERD.  Patient was previously followed by Dr. Whitfield.   Reports exertional dyspnea , able to walk one block before stopping. Difficulty climbing a flight of stairs.   Denies wheezes or cough.   No  hemoptysis.   Denies fever, chills or night sweats.   Denies postnasal drip, headaches, or  sinus tenderness.   Uses LABA/ICS/LAMA inhaler daily and albuterol HFA at least once a week.  Denies chest pain, orthopnea, PND, or swelling of LEs.  Denies sleeping problems, feels refresh in AM.   Reports mild acid reflux, on pepcid twice a day.  Tobacco user < 1 ppd for 40 years plus, quit 8 months ago.         Past Medical  History :     Past Medical History:   Diagnosis Date     Anxiety      Cigar smoker      GERD (gastroesophageal reflux disease)      Hyperlipidemia      Hypertension           Medications:     Current Outpatient Medications   Medication     albuterol (PROAIR HFA/PROVENTIL HFA/VENTOLIN HFA) 108 (90 Base) MCG/ACT inhaler     Atorvastatin Calcium (LIPITOR PO)     candesartan (ATACAND) 4 MG TABS     clonazePAM (KLONOPIN) 1 MG tablet     morphine (SHAHLA) 30 MG 24 hr capsule     multivitamin w/minerals (THERA-VIT-M) tablet     Omega-3 Fatty Acids (OMEGA-3 FISH OIL PO)     OxyCODONE HCl (OXYCONTIN PO)     OXYCODONE HCL PO     PARoxetine (PAXIL) 10 MG tablet     PROPRANOLOL HCL PO     RisperiDONE (RISPERDAL PO)     TRELEGY ELLIPTA 100-62.5-25 MCG/ACT oral inhaler     No current facility-administered medications for this visit.      Social History :     Social History     Socioeconomic History     Marital status:      Spouse name: Not on file     Number of children: Not on file     Years of education: Not on file     Highest education level: Not on file   Occupational History     Not on file   Tobacco Use     Smoking status: Former     Packs/day: 0.25     Years: 0.50     Pack years: 0.13     Types: Cigars, Cigarettes     Smokeless tobacco: Never   Vaping Use     Vaping Use: Never used   Substance and Sexual Activity     Alcohol use: Not Currently     Drug use: Not on file     Sexual activity: Not on file   Other Topics Concern     Not on file   Social History Narrative    Patient is  - has chronic low back pain     Social Determinants of Health     Financial Resource Strain: Not on file   Food Insecurity: Not on file   Transportation Needs: Not on file   Physical Activity: Not on file   Stress: Not on file   Social Connections: Not on file   Intimate Partner Violence: Not on file   Housing Stability: Not on file          Family History :     No family history on file.    Review of Systems  A 12 point  comprehensive review of systems was negative except as noted.        Objective:     /66 (BP Location: Left arm, Patient Position: Chair, Cuff Size: Adult Large)   Pulse (!) 47   Wt 104.6 kg (230 lb 11.2 oz)   SpO2 97%   BMI 32.18 kg/m        Gen: awake, alert, no distress  HEENT: pink conjunctiva, moist mucosa, Mallampati II/IV  Neck: no thyromegaly, masses or JVD  Lungs: decrease breath sounds at the bases otherwise clear  CV: regular, no murmurs or gallops appreciated  Abdomen: soft, NT, BS wnl  Ext: no edema  Neuro: CN II-XII intact, non focal      Diagnostic tests:         PFTs:     FEV1/FVC is 56% and is reduced.  FEV1 is 2.03L (55%) predicted and is reduced.  FVC is 3.62L (75%) predicted and normal.  There was no improvement in spirometry after a single inhaled dose of bronchodilator.  TLC is 8.65L (117%) predicted and is normal.  RV is 4.68L (189%) predicted and is increased.  DLCO is 27.07ml/min/hg (95%) predicted and is normal when it is corrected for hemoglobin.  Flow volume loops indicate scooping of the expiratory limbs.     IMAGES:     LOW DOSE LUNG CANCER SCREENING CT CHEST  LOCATION: [facility]  DATE/TIME: 6/14/2021 7:01 AM     INDICATION: Lung cancer screening. History of smoking. High risk patient with greater than 30 pack year smoking history.  COMPARISON: CT of the chest 2/28/2019 and 11/25/2015  FINDINGS:  NODULES: Punctate calcified granuloma posterolateral right lower lobe, definitively benign. No new or enlarging lung nodules.  LUNGS AND PLEURA: Foci of apical pleural parenchymal scarring are present and long-standing. Upper zone predominant paraseptal emphysema is also present. Central airway wall thickening. No bronchiectasis. A few foci of peripheral inspissated material   And subsegmental airways. No lung consolidation. No pleural space abnormality.  MEDIASTINUM: Cardiac chambers are normal in size and there is no pericardial effusion. No enlarged  thoracic lymph nodes. Normal caliber thoracic aorta. Mild degenerative calcification of the aortic root.  CORONARY ARTERY CALCIFICATION: Mild.  LIMITED UPPER ABDOMEN: No actionable findings in the imaged upper abdomen.  MUSCULOSKELETAL: Degenerative osteophytes of the imaged lower cervical spine into much lesser extent the lower thoracic spine. No aggressive or destructive bone lesions.  IMPRESSION:  Negative for lung cancer screening purposes.

## 2023-07-19 ENCOUNTER — HOSPITAL ENCOUNTER (OUTPATIENT)
Dept: CT IMAGING | Facility: HOSPITAL | Age: 63
Discharge: HOME | End: 2023-07-19
Attending: INTERNAL MEDICINE | Admitting: INTERNAL MEDICINE
Payer: MEDICARE

## 2023-07-19 DIAGNOSIS — J44.9 CHRONIC OBSTRUCTIVE PULMONARY DISEASE, UNSPECIFIED COPD TYPE (H): ICD-10-CM

## 2023-07-19 DIAGNOSIS — F17.200 TOBACCO USE DISORDER: ICD-10-CM

## 2023-07-19 DIAGNOSIS — Z12.2 ENCOUNTER FOR SCREENING FOR LUNG CANCER: ICD-10-CM

## 2023-07-19 DIAGNOSIS — R06.00 DYSPNEA, UNSPECIFIED TYPE: ICD-10-CM

## 2023-07-19 PROCEDURE — 71271 CT THORAX LUNG CANCER SCR C-: CPT | Mod: GA

## 2023-07-20 ENCOUNTER — TELEPHONE (OUTPATIENT)
Dept: PULMONOLOGY | Facility: CLINIC | Age: 63
End: 2023-07-20
Payer: COMMERCIAL

## 2023-07-20 DIAGNOSIS — R91.1 LUNG NODULE: Primary | ICD-10-CM

## 2023-07-20 LAB — RADIOLOGIST FLAGS: ABNORMAL

## 2023-07-20 NOTE — TELEPHONE ENCOUNTER
4B lung cancer screening result. PET recommended. ALYSA 20mm mostly solid nodule.     Called patient to relay results. Plan for PET but he has cervical spine surgery planned for 7/25. This finding should not delay surgery. Will plan for PET 2nd week of August so patient has enough recovery time and will see Dr. Goodman after that.    Leeann Lee MD

## 2023-07-21 RX ORDER — NALOXEGOL OXALATE 25 MG/1
25 TABLET, FILM COATED ORAL EVERY MORNING
COMMUNITY
Start: 2023-06-14 | End: 2023-07-24 | Stop reason: HOSPADM

## 2023-07-21 RX ORDER — FAMOTIDINE 20 MG/1
20 TABLET, FILM COATED ORAL DAILY
COMMUNITY
End: 2023-07-24 | Stop reason: HOSPADM

## 2023-07-21 RX ORDER — METHOCARBAMOL 500 MG/1
500 TABLET, FILM COATED ORAL 3 TIMES DAILY PRN
COMMUNITY
Start: 2023-04-27 | End: 2023-07-24 | Stop reason: HOSPADM

## 2023-07-24 ENCOUNTER — TELEPHONE (OUTPATIENT)
Dept: PULMONOLOGY | Facility: CLINIC | Age: 63
End: 2023-07-24
Payer: COMMERCIAL

## 2023-07-24 DIAGNOSIS — R91.8 PULMONARY NODULES: Primary | ICD-10-CM

## 2023-07-24 RX ORDER — CEFAZOLIN SODIUM/WATER 2 G/20 ML
2 SYRINGE (ML) INTRAVENOUS SEE ADMIN INSTRUCTIONS
Status: CANCELLED | OUTPATIENT
Start: 2023-07-25

## 2023-07-24 RX ORDER — ACETAMINOPHEN 325 MG/1
975 TABLET ORAL ONCE
Status: CANCELLED | OUTPATIENT
Start: 2023-07-24 | End: 2023-07-24

## 2023-07-24 RX ORDER — CEFAZOLIN SODIUM/WATER 2 G/20 ML
2 SYRINGE (ML) INTRAVENOUS
Status: CANCELLED | OUTPATIENT
Start: 2023-07-25

## 2023-07-24 RX ORDER — DEXAMETHASONE SODIUM PHOSPHATE 10 MG/ML
10 INJECTION, SOLUTION INTRAMUSCULAR; INTRAVENOUS ONCE
Status: CANCELLED | OUTPATIENT
Start: 2023-07-24 | End: 2023-07-24

## 2023-07-24 NOTE — TELEPHONE ENCOUNTER
"Alex called to request a call back from Dr. Lee in regards to some questions he has about the CT Scan. Advised Alex that Dr. Lee would not be in until Thursday. He is ok with waiting until Thursday or Friday to talk to Dr. Lee.     Called him to answer questions--wondering if it was \"jagged?\" Like spiculations? I answered no, but given size it is concerning for cancer so PET is pending and can decide on resection versus biopsy versus surveillance based on results.    Leeann Lee MD    "

## 2023-07-24 NOTE — PROGRESS NOTES
Pulmonary Note    Patient was previously informed about abnormal screening CT scan by Dr. Lee on 7/20/2023.    CT scan 7/19/2023  IMPRESSION:  1.  New predominantly solid nodule in the anterior apical left upper lobe adjacent to a focus of pleural parenchymal scarring measuring 17 x 20 mm.  2.  Two separate clustered foci of groundglass attenuation nodules right apex and inferior lateral right upper lobe, more consistent with inflammatory nodules.    Patient was planning to have cervical spine surgery, and recommended PET scan would be happening after recovery process from surgery.    Patient contacted clinic to inform that cervical spine surgery was cancelled.     PLAN    1. PET scan   2. Follow up in clinic to discuss test results.     Elan López MD  Pulmonary Medicine   07/24/23  2:05 PM

## 2023-08-03 ENCOUNTER — HOSPITAL ENCOUNTER (OUTPATIENT)
Dept: PET IMAGING | Facility: HOSPITAL | Age: 63
Discharge: HOME | End: 2023-08-03
Attending: INTERNAL MEDICINE | Admitting: INTERNAL MEDICINE
Payer: COMMERCIAL

## 2023-08-03 DIAGNOSIS — R91.1 LUNG NODULE: ICD-10-CM

## 2023-08-03 LAB — GLUCOSE BLDC GLUCOMTR-MCNC: 107 MG/DL (ref 70–99)

## 2023-08-03 PROCEDURE — 343N000001 HC RX 343: Performed by: INTERNAL MEDICINE

## 2023-08-03 PROCEDURE — G1010 CDSM STANSON: HCPCS | Mod: PI

## 2023-08-03 PROCEDURE — A9552 F18 FDG: HCPCS | Performed by: INTERNAL MEDICINE

## 2023-08-03 PROCEDURE — 82962 GLUCOSE BLOOD TEST: CPT | Mod: GZ

## 2023-08-03 RX ADMIN — FLUDEOXYGLUCOSE F-18 12.27 MILLICURIE: 500 INJECTION, SOLUTION INTRAVENOUS at 07:08

## 2023-08-04 ENCOUNTER — OFFICE VISIT (OUTPATIENT)
Dept: PULMONOLOGY | Facility: CLINIC | Age: 63
End: 2023-08-04
Payer: COMMERCIAL

## 2023-08-04 VITALS
BODY MASS INDEX: 31.77 KG/M2 | HEART RATE: 81 BPM | SYSTOLIC BLOOD PRESSURE: 118 MMHG | DIASTOLIC BLOOD PRESSURE: 70 MMHG | OXYGEN SATURATION: 98 % | WEIGHT: 227.8 LBS

## 2023-08-04 DIAGNOSIS — R91.8 PULMONARY NODULES: Primary | ICD-10-CM

## 2023-08-04 DIAGNOSIS — R94.2 ABNORMAL PET OF LEFT LUNG: ICD-10-CM

## 2023-08-04 DIAGNOSIS — J44.9 CHRONIC OBSTRUCTIVE PULMONARY DISEASE, UNSPECIFIED COPD TYPE (H): ICD-10-CM

## 2023-08-04 DIAGNOSIS — K21.9 GASTROESOPHAGEAL REFLUX DISEASE WITHOUT ESOPHAGITIS: ICD-10-CM

## 2023-08-04 PROCEDURE — 99214 OFFICE O/P EST MOD 30 MIN: CPT | Performed by: INTERNAL MEDICINE

## 2023-08-04 RX ORDER — LACTULOSE 10 G/15ML
20 SOLUTION ORAL DAILY PRN
COMMUNITY

## 2023-08-04 RX ORDER — NALOXONE HYDROCHLORIDE 4 MG/.1ML
0.1 SPRAY NASAL
COMMUNITY
Start: 2023-07-11 | End: 2024-07-09

## 2023-08-04 NOTE — PATIENT INSTRUCTIONS
Referral to thoracic surgery for evaluation of PET (+) left upper lobe nodule  Continue TRELEGY, once a day, rinse your mouth after each use  Albuterol HFA two puffs every 6 hours as needed, use it before activities   Avoid eating close to bedtime  Raise the head of the bed  Pepcid twice a day   Pulmonary rehab  Follow up in 3 months   
normal...

## 2023-08-06 NOTE — PROGRESS NOTES
PULMONARY OUTPATIENT FOLLOW UP NOTE        Assessment:      1. PET (+) Lung nodules  Mixed attenuation but predominantly solid lesion has developed in the anterior left upper lobe measuring 17 x 20 mm when comparing CT scans 2021 and July 2023.   PET CT scan showed increase uptake of ALYSA nodule, concerning for malignancy, no abnormal mediastinal adenopathy.   In addition, waxing/waning clusters of groundglass nodularity in RUL, which could be infectious.   Referral to thoracic surgery.   2. COPD  Significant tobacco use.   Chest CT scan showed foci of apical pleural parenchymal scarring. Upper zone predominant paraseptal emphysema   PFTs showed a moderate obstructive lung disease, air trapping, normal diffusion capacity   Continue ICS/LABA/LAMA. Albuterol HFA as needed  Referral to pulmonary rehabilitation  3. GERD  Avoid eating close to bedtime  Raise the head of the bed  Pepcid twice a day   4. Obesity Body mass index is 31.77 kg/m .     Plan:      1. Referral to thoracic surgery for evaluation of PET (+) left upper lobe nodule  2. Continue TRELEGY, once a day, rinse your mouth after each use  3. Albuterol HFA two puffs every 6 hours as needed, use it before activities   4. Avoid eating close to bedtime  5. Raise the head of the bed  6. Pepcid twice a day   7. Pulmonary rehab  8. Follow up in 3 months         Elan López  Pulmonary / Critical Care  August 4, 2023        CC:     Chief Complaint   Patient presents with     Follow Up     COPD   PET Result      HPI:      Alex Frost is a 62 year old male who presents for follow up appointment.  Patient has history of HTN, COPD, GERD.  Had a chest CT scan for lung cancer screening on July 19, study showed a mixed attenuation but predominantly solid lesion has developed in the anterior left upper lobe measuring 17 x 20 mm.  Patient was scheduled for PET CT scan and he is here to discuss results.     Reports unchanged exertional dyspnea , able to walk  one block before stopping. Difficulty climbing a flight of stairs.   Denies wheezes or cough.   No  hemoptysis.   Denies fever, chills or night sweats.   Denies postnasal drip, headaches, or  sinus tenderness.   Uses LABA/ICS/LAMA inhaler daily and albuterol HFA at least once a week.  Denies chest pain, orthopnea, PND, or swelling of LEs.  Denies sleeping problems, feels refresh in AM.   Reports mild acid reflux, on pepcid twice a day.  Tobacco user < 1 ppd for 40 years plus, quit 8 months ago.        Past Medical History :     Past Medical History:   Diagnosis Date     Anxiety      Cigar smoker      COPD (chronic obstructive pulmonary disease) (H)      GERD (gastroesophageal reflux disease)      Hyperlipidemia      Hypertension           Medications:     Current Outpatient Medications   Medication     albuterol (PROAIR HFA/PROVENTIL HFA/VENTOLIN HFA) 108 (90 Base) MCG/ACT inhaler     Atorvastatin Calcium (LIPITOR PO)     candesartan (ATACAND) 4 MG TABS     clonazePAM (KLONOPIN) 1 MG tablet     lactulose (GENERLAC) 10 GM/15ML solution     melatonin 5 MG tablet     morphine (SHAHLA) 30 MG 24 hr capsule     Multiple Vitamin (MULTI VITAMIN) TABS     multivitamin w/minerals (THERA-VIT-M) tablet     NARCAN 4 MG/0.1ML nasal spray     Omega-3 Fatty Acids (OMEGA-3 FISH OIL PO)     OxyCODONE HCl (OXYCONTIN PO)     OXYCODONE HCL PO     PARoxetine (PAXIL) 10 MG tablet     PROPRANOLOL HCL PO     RisperiDONE (RISPERDAL PO)     TRELEGY ELLIPTA 100-62.5-25 MCG/ACT oral inhaler     cholecalciferol 50 MCG (2000 UT) tablet     No current facility-administered medications for this visit.      Social History :     Social History     Socioeconomic History     Marital status:      Spouse name: Not on file     Number of children: Not on file     Years of education: Not on file     Highest education level: Not on file   Occupational History     Not on file   Tobacco Use     Smoking status: Former     Packs/day: 0.25     Years: 0.50      Pack years: 0.13     Types: Cigars, Cigarettes     Smokeless tobacco: Never   Vaping Use     Vaping Use: Never used   Substance and Sexual Activity     Alcohol use: Not Currently     Drug use: Not on file     Sexual activity: Not on file   Other Topics Concern     Not on file   Social History Narrative    Patient is  - has chronic low back pain     Social Determinants of Health     Financial Resource Strain: Not on file   Food Insecurity: Not on file   Transportation Needs: Not on file   Physical Activity: Not on file   Stress: Not on file   Social Connections: Not on file   Intimate Partner Violence: Not on file   Housing Stability: Not on file          Family History :     No family history on file.    Review of Systems  A 12 point comprehensive review of systems was negative except as noted.        Objective:     /70 (BP Location: Left arm, Patient Position: Sitting, Cuff Size: Adult Large)   Pulse 81   Wt 103.3 kg (227 lb 12.8 oz)   SpO2 98%   BMI 31.77 kg/m      Gen: awake, alert, no distress  HEENT: pink conjunctiva, moist mucosa, Mallampati II/IV  Neck: no thyromegaly, masses or JVD  Lungs: decrease breath sounds at the bases otherwise clear  CV: regular, no murmurs or gallops appreciated  Abdomen: soft, NT, BS wnl  Ext: no edema  Neuro: CN II-XII intact, non focal      Diagnostic tests:         PFTs:     FEV1/FVC is 56% and is reduced.  FEV1 is 2.03L (55%) predicted and is reduced.  FVC is 3.62L (75%) predicted and normal.  There was no improvement in spirometry after a single inhaled dose of bronchodilator.  TLC is 8.65L (117%) predicted and is normal.  RV is 4.68L (189%) predicted and is increased.  DLCO is 27.07ml/min/hg (95%) predicted and is normal when it is corrected for hemoglobin.  Flow volume loops indicate scooping of the expiratory limbs.     IMAGES:     LOW DOSE LUNG CANCER SCREENING CT CHEST  LOCATION: [facility]  DATE/TIME: 6/14/2021 7:01  AM     INDICATION: Lung cancer screening. History of smoking. High risk patient with greater than 30 pack year smoking history.  COMPARISON: CT of the chest 2/28/2019 and 11/25/2015  FINDINGS:  NODULES: Punctate calcified granuloma posterolateral right lower lobe, definitively benign. No new or enlarging lung nodules.  LUNGS AND PLEURA: Foci of apical pleural parenchymal scarring are present and long-standing. Upper zone predominant paraseptal emphysema is also present. Central airway wall thickening. No bronchiectasis. A few foci of peripheral inspissated material   And subsegmental airways. No lung consolidation. No pleural space abnormality.  MEDIASTINUM: Cardiac chambers are normal in size and there is no pericardial effusion. No enlarged thoracic lymph nodes. Normal caliber thoracic aorta. Mild degenerative calcification of the aortic root.  CORONARY ARTERY CALCIFICATION: Mild.  LIMITED UPPER ABDOMEN: No actionable findings in the imaged upper abdomen.  MUSCULOSKELETAL: Degenerative osteophytes of the imaged lower cervical spine into much lesser extent the lower thoracic spine. No aggressive or destructive bone lesions.  IMPRESSION:  Negative for lung cancer screening purposes.    LOW DOSE LUNG CANCER SCREENING CT CHEST  LOCATION: [facility]  DATE: 7/19/2023   INDICATION: Lung cancer screening. History of smoking. High risk patient.  COMPARISON: CT of the chest 06/14/2021, the 28th 2019  FINDINGS:  NODULES: Mixed attenuation but predominantly solid lesion is developed in the anterior left upper lobe measuring 17 x 20 mm (series 4, image 46) adjacent to an area of pleural-parenchymal scarring. A cluster of predominantly groundglass attenuation nodules has developed in the posterolateral right lower lobe the largest of which is 11 mm (series 4, image 98) as well as in the apex with largest in this location measuring 6 mm.  LUNGS AND PLEURA: Hyperinflation is edema. No lung fibrosis.  Central airways are patent and normal caliber. No pleural effusion.  MEDIASTINUM: Cardiac chambers are normal in size. No pericardial effusion. Normal caliber thoracic aorta. Conventional arch anatomy. No lymphadenopathy. Decompressed esophagus.  CORONARY ARTERY CALCIFICATION: Mild.  LIMITED UPPER ABDOMEN: No actionable findings in the imaged upper abdomen.   MUSCULOSKELETAL: Disc space narrowing and small marginal osteophytes of the thoracic spine.  IMPRESSION:  1.  New predominantly solid nodule in the anterior apical left upper lobe adjacent to a focus of pleural parenchymal scarring measuring 17 x 20 mm.  2.  Two separate clustered foci of groundglass attenuation nodules right apex and inferior lateral right upper lobe, more consistent with inflammatory nodules.       PET ONCOLOGY (EYES TO THIGHS)  LOCATION: [facility]  DATE: 8/3/2023  INDICATION: Other nonspecific abnormal findings of lung field.  COMPARISON: CT chest 07/19/2023. CT chest 06/14/2021.  CONTRAST: None.  PET/CT FINDINGS: FDG avid spiculated nodule the anterior left upper lobe abutting the anterior pleura which measures 2.0 x 1.7 cm (SUV max 8.4), which is similar compared to 07/18/2023 and has grown since 06/14/2021 where it measured 1.1 x 0.8 cm.   Findings are suspicious for primary lung neoplasm without metastasis.  While there is near complete resolution of the areas of clustered groundglass nodules at the right apex beneath the apical scarring and in the inferior lateral right upper lobe which are not FDG avid, there has been interval development of a new cluster of endobronchial nodules in the right lower lobe adjacent to the minor fissure (SUV max 3.2) since 07/19/2023. Findings suggest that these reflect a multifocal infectious/inflammatory process.  CT FINDINGS: Mild carotid artery bifurcation calcification. Mild coronary artery calcification. Left renal cyst. Scattered atherosclerotic calcifications. Pelvic  phleboliths. Mild bladder wall thickening which may be due to underdistention. Multilevel degenerative changes of the spine                                          IMPRESSION:  1. Findings suspicious for left upper lobe primary lung neoplasm without metastasis.  2. Waxing/waning clusters of groundglass nodularity in the right lung are favored to represent a multifocal infectious/inflammatory process, although warrant continued imaging surveillance.

## 2023-08-17 ENCOUNTER — HOSPITAL ENCOUNTER (OUTPATIENT)
Dept: CARDIAC REHAB | Facility: CLINIC | Age: 63
Discharge: HOME | End: 2023-08-17
Attending: INTERNAL MEDICINE
Payer: COMMERCIAL

## 2023-08-17 DIAGNOSIS — R06.00 DYSPNEA, UNSPECIFIED TYPE: ICD-10-CM

## 2023-08-17 DIAGNOSIS — J44.9 CHRONIC OBSTRUCTIVE PULMONARY DISEASE, UNSPECIFIED COPD TYPE (H): ICD-10-CM

## 2023-08-17 PROCEDURE — 94626 PHY/QHP OP PULM RHB W/MNTR: CPT

## 2023-08-22 ENCOUNTER — HOSPITAL ENCOUNTER (OUTPATIENT)
Dept: CARDIAC REHAB | Facility: CLINIC | Age: 63
Discharge: HOME | End: 2023-08-22
Attending: INTERNAL MEDICINE
Payer: COMMERCIAL

## 2023-08-22 PROCEDURE — 94625 PHY/QHP OP PULM RHB W/O MNTR: CPT

## 2023-08-24 ENCOUNTER — HOSPITAL ENCOUNTER (OUTPATIENT)
Dept: CARDIAC REHAB | Facility: CLINIC | Age: 63
Discharge: HOME | End: 2023-08-24
Attending: FAMILY MEDICINE
Payer: COMMERCIAL

## 2023-08-24 PROCEDURE — 94625 PHY/QHP OP PULM RHB W/O MNTR: CPT

## 2023-09-05 ENCOUNTER — HOSPITAL ENCOUNTER (OUTPATIENT)
Dept: CARDIAC REHAB | Facility: CLINIC | Age: 63
Discharge: HOME | End: 2023-09-05
Attending: FAMILY MEDICINE
Payer: COMMERCIAL

## 2023-09-05 PROCEDURE — 94625 PHY/QHP OP PULM RHB W/O MNTR: CPT

## 2023-09-07 ENCOUNTER — PREP FOR PROCEDURE (OUTPATIENT)
Dept: SURGERY | Facility: CLINIC | Age: 63
End: 2023-09-07

## 2023-09-07 ENCOUNTER — HOSPITAL ENCOUNTER (OUTPATIENT)
Dept: CARDIAC REHAB | Facility: CLINIC | Age: 63
Discharge: HOME | End: 2023-09-07
Attending: FAMILY MEDICINE
Payer: COMMERCIAL

## 2023-09-07 ENCOUNTER — OFFICE VISIT (OUTPATIENT)
Dept: PULMONOLOGY | Facility: CLINIC | Age: 63
End: 2023-09-07
Payer: COMMERCIAL

## 2023-09-07 VITALS
HEART RATE: 55 BPM | SYSTOLIC BLOOD PRESSURE: 146 MMHG | WEIGHT: 232.4 LBS | BODY MASS INDEX: 32.41 KG/M2 | DIASTOLIC BLOOD PRESSURE: 77 MMHG | OXYGEN SATURATION: 95 %

## 2023-09-07 DIAGNOSIS — R91.8 PULMONARY NODULES: ICD-10-CM

## 2023-09-07 DIAGNOSIS — R91.1 LUNG NODULE: Primary | ICD-10-CM

## 2023-09-07 PROCEDURE — 99204 OFFICE O/P NEW MOD 45 MIN: CPT | Performed by: THORACIC SURGERY (CARDIOTHORACIC VASCULAR SURGERY)

## 2023-09-07 PROCEDURE — 94625 PHY/QHP OP PULM RHB W/O MNTR: CPT

## 2023-09-07 RX ORDER — NALOXEGOL OXALATE 25 MG/1
1 TABLET, FILM COATED ORAL
COMMUNITY
Start: 2023-09-06 | End: 2024-01-04

## 2023-09-07 RX ORDER — ACETAMINOPHEN 325 MG/1
975 TABLET ORAL ONCE
Status: CANCELLED | OUTPATIENT
Start: 2023-09-07 | End: 2023-09-07

## 2023-09-07 RX ORDER — ENOXAPARIN SODIUM 100 MG/ML
40 INJECTION SUBCUTANEOUS
Status: CANCELLED | OUTPATIENT
Start: 2023-09-07

## 2023-09-07 NOTE — PROGRESS NOTES
THORACIC SURGERY - NEW PATIENT OFFICE VISIT      Dear Dr. Hernandez,    I saw Alex Frost at [facility]' request in consultation for the evaluation and treatment of a nodule of the LEFT upper lobe.    HPI  Mr. Alex Frost is a 62 year old male patient who presents with a ALYSA nodule, asymptomatic.          He can walk 2-3 blocks and then has to stop due to dyspnea. He can climb a couple of flights of stairs. Mr. Pringle has been cardiopulmonary rehab for a few weeks, but he is not sure it has helped yet.  ECOG performance status  1- Mild physical restriction, sedentary                 Previsit Tests   PFT (4/2021 - smoking at the time): FEV1 55% predicted, 2 L, DLCO 95%  CT scan (7/19/2023): LEFT upper lobe 2cm pulmonary Nodule, without mediastinal adenopathy, with no pleural effusion (1.1 cm 6/2021). RIGHT lung ill-defined nodular opacities.      PET scan (): FDG avid 2cm  LEFT upper lobe Nodule (Max SUV 8.4). No suspicious hypermetabolic activity elsewhere        PMH  Reviewed, as below    Past Medical History:   Diagnosis Date    Anxiety     Cigar smoker     COPD (chronic obstructive pulmonary disease) (H)     GERD (gastroesophageal reflux disease)     Hyperlipidemia     Hypertension         PSH  Reviewed, as below    Past Surgical History:   Procedure Laterality Date    IR LUMBAR EPIDURAL STEROID INJECTION  10/9/2002        Allergies   Allergen Reactions    Amitriptyline      Reaction: Racing heart (tachycardia)     Lisinopril      Reaction: Facial Parathesia (numbness)    Lyrica [Pregabalin]      Reaction: Facial Parathesia (numbness)     Moxifloxacin Hcl In Nacl      Reaction: Anxiety    Neurontin [Gabapentin]      Reaction: Rash and itching       Current Outpatient Medications   Medication    albuterol (PROAIR HFA/PROVENTIL HFA/VENTOLIN HFA) 108 (90 Base) MCG/ACT inhaler    Atorvastatin Calcium (LIPITOR PO)    candesartan (ATACAND) 4 MG TABS    cholecalciferol 50 MCG (2000 UT) tablet    clonazePAM (KLONOPIN) 1  MG tablet    lactulose (GENERLAC) 10 GM/15ML solution    melatonin 5 MG tablet    morphine (SHAHLA) 30 MG 24 hr capsule    MOVANTIK 25 MG TABS tablet    Multiple Vitamin (MULTI VITAMIN) TABS    Omega-3 Fatty Acids (OMEGA-3 FISH OIL PO)    OXYCODONE HCL PO    PARoxetine (PAXIL) 10 MG tablet    PROPRANOLOL HCL PO    RisperiDONE (RISPERDAL PO)    TRELEGY ELLIPTA 100-62.5-25 MCG/ACT oral inhaler    multivitamin w/minerals (THERA-VIT-M) tablet    NARCAN 4 MG/0.1ML nasal spray    OxyCODONE HCl (OXYCONTIN PO)     No current facility-administered medications for this visit.       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 His wife Aren, works as a manager for the [employer]. They have 2 kids, twins, aged 25.    IMPRESSION   62 year old male patient with LEFT upper lobe nodule.    Stage: Indeterminate pulmonary lesion, IF this were a cancer, clinical stage IA    PLAN  I spent 45 min on the date of the encounter in chart review, patient visit, review of tests, documentation and/or discussion with other providers about the issues documented above. I reviewed the plan as follows:  I am concerned about Mr. Frost's limited exercise capacity, but a segmentectomy should be feasible. We'll observe the other ill-defined areas of nodularity particularly on the right.  Repeat PFT  New CT preop  Walk every day  Procedure planned: Left VATS upper lobe wedge resection or S3 segmentectomy, with possible thoracotomy.  I reviewed the indications, risks, and benefits of the procedure with . Alex Frost. We discussed the intraoperative risks of bleeding and injury to vital organs, potential postoperative complications including, but not  limited to, major respiratory events, arrhythmia, bleeding, infection, reoperation, and death. I explained the anticipated hospital course (+/- 1-3 days) and postoperative recovery including pain control, chest drain management, and variable degrees of dyspnea (or need for supplemental oxygen) and fatigue that tend to get better with time.   Necessary Preop Tests & Appointments: Preoperative assessment clinic, Pulmonary function testing, and CT Chest  Regional Anesthesia Plan: Intraoperative intercostal nerve block  Anticoagulation Plan: Prophylactic Lovenox      I appreciate the opportunity to participate in the care of your patient and will keep you updated.  Sincerely,    Jorge Gotti MD

## 2023-09-14 ENCOUNTER — TELEPHONE (OUTPATIENT)
Dept: SURGERY | Facility: CLINIC | Age: 63
End: 2023-09-14
Payer: COMMERCIAL

## 2023-09-14 ENCOUNTER — HOSPITAL ENCOUNTER (OUTPATIENT)
Dept: CARDIAC REHAB | Facility: CLINIC | Age: 63
Discharge: HOME | End: 2023-09-14
Attending: FAMILY MEDICINE
Payer: COMMERCIAL

## 2023-09-14 PROCEDURE — 94625 PHY/QHP OP PULM RHB W/O MNTR: CPT

## 2023-09-14 NOTE — TELEPHONE ENCOUNTER
Called pt to offer 10/2 surgery date with Dr. Gotti and got no answer. Left voicemail message with direct call back number [phone].    Julianna Burnette on 9/14/2023 at 9:57 AM

## 2023-09-14 NOTE — TELEPHONE ENCOUNTER
Spoke with patient to schedule procedure with Dr. Dong   Procedure was scheduled on 10/2 at [facility] OR  Patient will have H&P with PAC    Patient is aware a COVID-19 test is needed before their procedure ONLY IF symptomatic.   (Patient is aware Thoracic is no longer requiring COVID-19 test)       Patient is aware a / is needed day of surgery.   Surgery Letter was mailed on date: ,     Patient has my direct contact information for any further questions.

## 2023-09-15 NOTE — TELEPHONE ENCOUNTER
FUTURE VISIT INFORMATION      SURGERY INFORMATION:  Date: 10/2/23  Location: [facility] or  Surgeon:  Jorge Gotti MD   Anesthesia Type:  general  Procedure: LEFT THORACOSCOPIC UPPER LOBE WEDGE RESECTION POSSIBLE SEGMENTECTOMY   Consult: ov 23    RECORDS REQUESTED FROM:       Primary Care Provider: Manav Hernandez MD     Pertinent Medical History: sonia, hypertension    Most recent EKG+ Tracin23    Most recent PFT's: 21

## 2023-09-18 ENCOUNTER — OFFICE VISIT (OUTPATIENT)
Dept: PULMONOLOGY | Facility: CLINIC | Age: 63
End: 2023-09-18
Payer: COMMERCIAL

## 2023-09-18 DIAGNOSIS — R91.8 PULMONARY NODULES: ICD-10-CM

## 2023-09-18 LAB — HGB BLD-MCNC: 13.5 G/DL

## 2023-09-18 PROCEDURE — 94060 EVALUATION OF WHEEZING: CPT | Performed by: INTERNAL MEDICINE

## 2023-09-18 PROCEDURE — 94726 PLETHYSMOGRAPHY LUNG VOLUMES: CPT | Performed by: INTERNAL MEDICINE

## 2023-09-18 PROCEDURE — 94729 DIFFUSING CAPACITY: CPT | Performed by: INTERNAL MEDICINE

## 2023-09-18 PROCEDURE — 85018 HEMOGLOBIN: CPT | Performed by: INTERNAL MEDICINE

## 2023-09-19 ENCOUNTER — HOSPITAL ENCOUNTER (OUTPATIENT)
Dept: CARDIAC REHAB | Facility: CLINIC | Age: 63
Discharge: HOME | End: 2023-09-19
Attending: FAMILY MEDICINE
Payer: COMMERCIAL

## 2023-09-19 LAB
DLCOCOR-%PRED-PRE: 115 %
DLCOCOR-PRE: 31.99 ML/MIN/MMHG
DLCOUNC-%PRED-PRE: 111 %
DLCOUNC-PRE: 30.95 ML/MIN/MMHG
DLCOUNC-PRED: 27.73 ML/MIN/MMHG
ERV-%PRED-PRE: 67 %
ERV-PRE: 1.07 L
ERV-PRED: 1.59 L
EXPTIME-PRE: 10.31 SEC
FEF2575-%PRED-POST: 41 %
FEF2575-%PRED-PRE: 51 %
FEF2575-POST: 1.11 L/SEC
FEF2575-PRE: 1.38 L/SEC
FEF2575-PRED: 2.7 L/SEC
FEFMAX-%PRED-PRE: 94 %
FEFMAX-PRE: 8.62 L/SEC
FEFMAX-PRED: 9.16 L/SEC
FEV1-%PRED-PRE: 87 %
FEV1-PRE: 2.9 L
FEV1FEV6-PRE: 64 %
FEV1FEV6-PRED: 79 %
FEV1FVC-PRE: 60 %
FEV1FVC-PRED: 78 %
FEV1SVC-PRE: 58 %
FEV1SVC-PRED: 65 %
FIFMAX-PRE: 8.29 L/SEC
FRCPLETH-%PRED-PRE: 116 %
FRCPLETH-PRE: 4.52 L
FRCPLETH-PRED: 3.87 L
FVC-%PRED-PRE: 113 %
FVC-PRE: 4.84 L
FVC-PRED: 4.28 L
IC-%PRED-PRE: 124 %
IC-PRE: 3.94 L
IC-PRED: 3.18 L
RVPLETH-%PRED-PRE: 142 %
RVPLETH-PRE: 3.45 L
RVPLETH-PRED: 2.41 L
TLCPLETH-%PRED-PRE: 113 %
TLCPLETH-PRE: 8.46 L
TLCPLETH-PRED: 7.43 L
VA-%PRED-PRE: 106 %
VA-PRE: 7.19 L
VC-%PRED-PRE: 98 %
VC-PRE: 5.01 L
VC-PRED: 5.08 L

## 2023-09-19 PROCEDURE — 94625 PHY/QHP OP PULM RHB W/O MNTR: CPT

## 2023-09-20 ENCOUNTER — HOSPITAL ENCOUNTER (OUTPATIENT)
Dept: CT IMAGING | Facility: HOSPITAL | Age: 63
Discharge: HOME | End: 2023-09-20
Attending: THORACIC SURGERY (CARDIOTHORACIC VASCULAR SURGERY) | Admitting: THORACIC SURGERY (CARDIOTHORACIC VASCULAR SURGERY)
Payer: COMMERCIAL

## 2023-09-20 DIAGNOSIS — R91.8 PULMONARY NODULES: ICD-10-CM

## 2023-09-20 PROCEDURE — 71250 CT THORAX DX C-: CPT | Mod: ME

## 2023-09-20 NOTE — TELEPHONE ENCOUNTER
Received [facility] pt stating he has his pre op Friday but still hasn't received his map of where to go yet. Writer called back and spoke with the pt who stated it came in the mail today, so he is all set. Confirmed location of the surgery as [address]

## 2023-09-21 ENCOUNTER — HOSPITAL ENCOUNTER (OUTPATIENT)
Dept: CARDIAC REHAB | Facility: CLINIC | Age: 63
Discharge: HOME | End: 2023-09-21
Attending: FAMILY MEDICINE
Payer: COMMERCIAL

## 2023-09-21 LAB
ABO/RH(D): NORMAL
ANTIBODY SCREEN: NEGATIVE
SPECIMEN EXPIRATION DATE: NORMAL

## 2023-09-21 PROCEDURE — 94625 PHY/QHP OP PULM RHB W/O MNTR: CPT

## 2023-09-22 ENCOUNTER — PRE VISIT (OUTPATIENT)
Dept: SURGERY | Facility: CLINIC | Age: 63
End: 2023-09-22

## 2023-09-22 ENCOUNTER — LAB (OUTPATIENT)
Dept: LAB | Facility: CLINIC | Age: 63
End: 2023-09-22
Payer: COMMERCIAL

## 2023-09-22 ENCOUNTER — OFFICE VISIT (OUTPATIENT)
Dept: SURGERY | Facility: CLINIC | Age: 63
End: 2023-09-22
Payer: COMMERCIAL

## 2023-09-22 ENCOUNTER — ANESTHESIA EVENT (OUTPATIENT)
Dept: SURGERY | Facility: CLINIC | Age: 63
End: 2023-09-22
Payer: COMMERCIAL

## 2023-09-22 VITALS
OXYGEN SATURATION: 96 % | DIASTOLIC BLOOD PRESSURE: 70 MMHG | HEIGHT: 71 IN | SYSTOLIC BLOOD PRESSURE: 107 MMHG | HEART RATE: 51 BPM | BODY MASS INDEX: 32.76 KG/M2 | WEIGHT: 234 LBS | TEMPERATURE: 98.2 F | RESPIRATION RATE: 16 BRPM

## 2023-09-22 DIAGNOSIS — Z01.818 PREOP EXAMINATION: ICD-10-CM

## 2023-09-22 DIAGNOSIS — R91.1 LUNG NODULE: ICD-10-CM

## 2023-09-22 DIAGNOSIS — Z01.818 PREOP EXAMINATION: Primary | ICD-10-CM

## 2023-09-22 LAB
ANION GAP SERPL CALCULATED.3IONS-SCNC: 9 MMOL/L (ref 7–15)
BUN SERPL-MCNC: 12.2 MG/DL (ref 8–23)
CALCIUM SERPL-MCNC: 9.2 MG/DL (ref 8.8–10.2)
CHLORIDE SERPL-SCNC: 98 MMOL/L (ref 98–107)
CREAT SERPL-MCNC: 1.3 MG/DL (ref 0.67–1.17)
DEPRECATED HCO3 PLAS-SCNC: 26 MMOL/L (ref 22–29)
EGFRCR SERPLBLD CKD-EPI 2021: 62 ML/MIN/1.73M2
ERYTHROCYTE [DISTWIDTH] IN BLOOD BY AUTOMATED COUNT: 11.9 % (ref 10–15)
GLUCOSE SERPL-MCNC: 94 MG/DL (ref 70–99)
HCT VFR BLD AUTO: 37 % (ref 40–53)
HGB BLD-MCNC: 12.4 G/DL (ref 13.3–17.7)
MCH RBC QN AUTO: 29.6 PG (ref 26.5–33)
MCHC RBC AUTO-ENTMCNC: 33.5 G/DL (ref 31.5–36.5)
MCV RBC AUTO: 88 FL (ref 78–100)
PLATELET # BLD AUTO: 213 10E3/UL (ref 150–450)
POTASSIUM SERPL-SCNC: 4.8 MMOL/L (ref 3.4–5.3)
RBC # BLD AUTO: 4.19 10E6/UL (ref 4.4–5.9)
SODIUM SERPL-SCNC: 133 MMOL/L (ref 136–145)
WBC # BLD AUTO: 5.8 10E3/UL (ref 4–11)

## 2023-09-22 PROCEDURE — 85027 COMPLETE CBC AUTOMATED: CPT | Performed by: PATHOLOGY

## 2023-09-22 PROCEDURE — 36415 COLL VENOUS BLD VENIPUNCTURE: CPT | Performed by: PATHOLOGY

## 2023-09-22 PROCEDURE — 86923 COMPATIBILITY TEST ELECTRIC: CPT | Performed by: PATHOLOGY

## 2023-09-22 PROCEDURE — 86901 BLOOD TYPING SEROLOGIC RH(D): CPT | Performed by: NURSE PRACTITIONER

## 2023-09-22 PROCEDURE — 99204 OFFICE O/P NEW MOD 45 MIN: CPT | Performed by: NURSE PRACTITIONER

## 2023-09-22 PROCEDURE — 80048 BASIC METABOLIC PNL TOTAL CA: CPT | Performed by: PATHOLOGY

## 2023-09-22 RX ORDER — CHLORHEXIDINE GLUCONATE ORAL RINSE 1.2 MG/ML
15 SOLUTION DENTAL ONCE
Status: CANCELLED | OUTPATIENT
Start: 2023-09-22 | End: 2023-09-22

## 2023-09-22 RX ORDER — MORPHINE SULFATE 15 MG/1
30 TABLET, FILM COATED, EXTENDED RELEASE ORAL AT BEDTIME
COMMUNITY
End: 2024-10-04

## 2023-09-22 RX ORDER — PAROXETINE HYDROCHLORIDE HEMIHYDRATE 37.5 MG/1
37.5 TABLET, FILM COATED, EXTENDED RELEASE ORAL EVERY MORNING
COMMUNITY

## 2023-09-22 RX ORDER — CELECOXIB 200 MG/1
200 CAPSULE ORAL ONCE
Status: CANCELLED | OUTPATIENT
Start: 2023-09-22 | End: 2023-09-22

## 2023-09-22 RX ORDER — DOCUSATE SODIUM 100 MG/1
200 CAPSULE, LIQUID FILLED ORAL AT BEDTIME
COMMUNITY

## 2023-09-22 RX ORDER — IBUPROFEN 200 MG
600 TABLET ORAL EVERY 8 HOURS PRN
COMMUNITY
End: 2024-10-03 | Stop reason: SINTOL

## 2023-09-22 RX ORDER — ACETAMINOPHEN 325 MG/1
975 TABLET ORAL ONCE
Status: CANCELLED | OUTPATIENT
Start: 2023-09-22 | End: 2023-09-22

## 2023-09-22 RX ORDER — PAROXETINE HYDROCHLORIDE HEMIHYDRATE 25 MG/1
25 TABLET, FILM COATED, EXTENDED RELEASE ORAL EVERY MORNING
COMMUNITY

## 2023-09-22 RX ORDER — CANDESARTAN 32 MG/1
16 TABLET ORAL EVERY MORNING
COMMUNITY

## 2023-09-22 RX ORDER — FAMOTIDINE 20 MG/1
20 TABLET, FILM COATED ORAL 2 TIMES DAILY
COMMUNITY
Start: 2023-09-22

## 2023-09-22 RX ORDER — SENNOSIDES 8.6 MG
5 TABLET ORAL AT BEDTIME
COMMUNITY

## 2023-09-22 ASSESSMENT — COPD QUESTIONNAIRES: COPD: 1

## 2023-09-22 ASSESSMENT — ENCOUNTER SYMPTOMS: ORTHOPNEA: 0

## 2023-09-22 ASSESSMENT — LIFESTYLE VARIABLES: TOBACCO_USE: 1

## 2023-09-22 ASSESSMENT — PAIN SCALES - GENERAL: PAINLEVEL: NO PAIN (0)

## 2023-09-22 NOTE — PROGRESS NOTES
Preoperative Assessment Center Medication History Note  Medication history completed on September 22, 2023 by this writer prior to patient's PAC appointment. See Epic admission navigator for prior to admission medications. Operating room staff will still need to confirm medications and last dose information on day of surgery.     Medication history interview sources  Patient via in-person    Changes made to PTA medication list  Added: senna, docusate, ibuprofen.   Deleted: duplicate mvi. OxyContin.   Changed: candesartan dose/sig/dosage form, clonazepam dose/sig, lactulose sig, Morphine ER dose/sig, paxil dose/sig/dosage form, risperdal dose/sig,     Allergies reviewed with patient and updates made in EHR: yes    -- No recent (within 30 days) course of antibiotics  -- No recent (within 30 days) course of systemic steroids  -- Reports not being on blood thinning medications    -- Declines being on any other prescription or over-the-counter medications    Prior to Admission medications    Medication Sig Last Dose Taking? Auth Provider Long Term End Date   albuterol (PROAIR HFA/PROVENTIL HFA/VENTOLIN HFA) 108 (90 Base) MCG/ACT inhaler INHALE 2 PUFFS INTO THE LUNGS EVERY 6 HOURS AS NEEDED FOR SHORTNESS OF BREATH OR DIFFICULT BREATHING OR WHEEZING Taking Yes Zahra Whitfield MD Yes    Atorvastatin Calcium (LIPITOR PO) Take 20 mg by mouth every evening Taking Yes Reported, Patient Yes    candesartan (ATACAND) 32 MG tablet Take 16 mg by mouth every morning 1/2 of a 32 mg tablet daily in AM Taking Yes Unknown, Entered By History Yes    cholecalciferol 50 MCG (2000 UT) tablet Take 1 tablet by mouth daily Taking Yes Reported, Patient     clonazePAM (KLONOPIN) 1 MG tablet Take 2 mg (2 tablets) in the morning and take 1 mg in the afternoon. Taking Yes Reported, Patient Yes    docusate sodium (COLACE) 100 MG capsule Take 200 mg by mouth At Bedtime Taking Yes Unknown, Entered By History     ibuprofen (ADVIL/MOTRIN) 200 MG  tablet Take 600 mg by mouth every 8 hours as needed for pain Taking Yes Unknown, Entered By History     lactulose (GENERLAC) 10 GM/15ML solution Take 20 g by mouth daily as needed Taking Yes Reported, Patient     melatonin 5 MG tablet Take 10 mg by mouth At Bedtime Taking Yes Reported, Patient     morphine (MS CONTIN) 15 MG CR tablet Take 30 mg by mouth At Bedtime Taking Yes Unknown, Entered By History     MOVANTIK 25 MG TABS tablet Take 1 tablet by mouth every morning (before breakfast) Taking Yes Reported, Patient     multivitamin w/minerals (THERA-VIT-M) tablet Take 1 tablet by mouth daily Taking Yes Reported, Patient     Omega-3 Fatty Acids (OMEGA-3 FISH OIL PO) Take 1 g by mouth daily Taking Yes Reported, Patient     OXYCODONE HCL PO Take 15 mg by mouth 5 times daily 5x per day while awake Taking Yes Reported, Patient     PARoxetine (PAXIL-CR) 12.5 MG 24 hr tablet Take 12.5 mg by mouth every morning Takes with 37.5 mg tablet for total of 50 mg every morning. Taking Yes Unknown, Entered By History Yes    PARoxetine (PAXIL-CR) 37.5 MG 24 hr tablet Take 37.5 mg by mouth every morning Takes with 12.5 mg tablet for total of 50 mg every morning. Taking Yes Unknown, Entered By History Yes    PROPRANOLOL HCL PO Take 10 mg by mouth 2 times daily Taking Yes Reported, Patient Yes    RisperiDONE (RISPERDAL PO) Take 2 mg (2 tablets) in the morning and take 1 mg (1 tablet) in the evening. Taking Yes Reported, Patient Yes    sennosides (SENOKOT) 8.6 MG tablet Take 5 tablets by mouth At Bedtime Taking Yes Unknown, Entered By History     TRELEGY ELLIPTA 100-62.5-25 MCG/ACT oral inhaler INHALE 1 PUFF BY MOUTH DAILY Taking Yes Amy Ordoñez MD     NARCAN 4 MG/0.1ML nasal spray Spray 0.1 mLs in nostril  Patient not taking: Reported on 9/7/2023 Not Taking  Reported, Patient Yes 7/9/24           Medication History Completed By: David Win [facility] 9/22/2023 12:49 PM

## 2023-09-22 NOTE — H&P
Pre-Operative H & P     CC:  Preoperative exam to assess for increased cardiopulmonary risk while undergoing surgery and anesthesia.    Date of Encounter: 9/22/2023  Primary Care Physician:  Manav Hernandez     Reason for visit:   Encounter Diagnoses   Name Primary?    Preop examination Yes    Lung nodule        HPI  Alex Frost is a 62 year old male who presents for pre-operative H & P in preparation for  Procedure Information       Case: [id number] Date/Time: 10/02/23 0730    Procedure: LEFT THORACOSCOPIC UPPER LOBE WEDGE RESECTION POSSIBLE SEGMENTECTOMY (Left: Chest)    Anesthesia type: General    Diagnosis: Lung nodule [R91.1]    Pre-op diagnosis: Lung nodule [R91.1]    Location:  OR  /  OR    Providers: Jorge Gotti MD            Alex Frost is a 62 year old male with hypertension, hyperlipidemia,COPD, sleep apnea, anemia,fibromyalgia, chronic neck and back pain, GERD, anxiety and depression that has a concerning lung nodule in the left lung.  He had a CT lung cancer screening in 2021 that was negative, but he repeated again this past summer and there was a new finding of a suspicious lung nodule in the anterior apical left upper lobe.  He has since consulted with Dr. Gotti and the above listed procedure has been recommended for further evaluation and treatment.     History is obtained from the patient and chart review    Hx of abnormal bleeding or anti-platelet use: none      Past Medical History  Past Medical History:   Diagnosis Date    Anxiety     Chronic back pain     Chronic kidney disease     Cigar smoker     COPD (chronic obstructive pulmonary disease) (H)     Depression     GERD (gastroesophageal reflux disease)     Hyperlipidemia     Hypertension     Obesity     Sleep apnea        Past Surgical History  Past Surgical History:   Procedure Laterality Date    APPENDECTOMY      CERVICAL LAMINECTOMY      partial x 2    IR LUMBAR EPIDURAL STEROID INJECTION  10/09/2002     TONSILLECTOMY         Prior to Admission Medications  Current Outpatient Medications   Medication Sig Dispense Refill    albuterol (PROAIR HFA/PROVENTIL HFA/VENTOLIN HFA) 108 (90 Base) MCG/ACT inhaler INHALE 2 PUFFS INTO THE LUNGS EVERY 6 HOURS AS NEEDED FOR SHORTNESS OF BREATH OR DIFFICULT BREATHING OR WHEEZING 8.5 g 11    Atorvastatin Calcium (LIPITOR PO) Take 20 mg by mouth every evening      candesartan (ATACAND) 32 MG tablet Take 16 mg by mouth every morning 1/2 of a 32 mg tablet daily in AM      cholecalciferol 50 MCG (2000 UT) tablet Take 1 tablet by mouth daily      clonazePAM (KLONOPIN) 1 MG tablet Take 2 mg (2 tablets) in the morning and take 1 mg in the afternoon.      docusate sodium (COLACE) 100 MG capsule Take 200 mg by mouth At Bedtime      famotidine (PEPCID) 20 MG tablet Take 1 tablet (20 mg) by mouth 2 times daily      ibuprofen (ADVIL/MOTRIN) 200 MG tablet Take 600 mg by mouth every 8 hours as needed for pain      lactulose (GENERLAC) 10 GM/15ML solution Take 20 g by mouth daily as needed      melatonin 5 MG tablet Take 10 mg by mouth At Bedtime      morphine (MS CONTIN) 15 MG CR tablet Take 30 mg by mouth At Bedtime      MOVANTIK 25 MG TABS tablet Take 1 tablet by mouth every morning (before breakfast)      multivitamin w/minerals (THERA-VIT-M) tablet Take 1 tablet by mouth daily      Omega-3 Fatty Acids (OMEGA-3 FISH OIL PO) Take 1 g by mouth daily      OXYCODONE HCL PO Take 15 mg by mouth 5 times daily 5x per day while awake      PARoxetine (PAXIL-CR) 12.5 MG 24 hr tablet Take 12.5 mg by mouth every morning Takes with 37.5 mg tablet for total of 50 mg every morning.      PARoxetine (PAXIL-CR) 37.5 MG 24 hr tablet Take 37.5 mg by mouth every morning Takes with 12.5 mg tablet for total of 50 mg every morning.      PROPRANOLOL HCL PO Take 10 mg by mouth 2 times daily      RisperiDONE (RISPERDAL PO) Take 2 mg (2 tablets) in the morning and take 1 mg (1 tablet) in the evening.      sennosides  (SENOKOT) 8.6 MG tablet Take 5 tablets by mouth At Bedtime      RAJAN ELLIPTA 100-62.5-25 MCG/ACT oral inhaler INHALE 1 PUFF BY MOUTH DAILY 60 each 11    NARCAN 4 MG/0.1ML nasal spray Spray 0.1 mLs in nostril (Patient not taking: Reported on 2023)         Allergies  Allergies   Allergen Reactions    Amitriptyline      Reaction: Racing heart (tachycardia)     Lisinopril      Reaction: Facial Parathesia (numbness)    Lyrica [Pregabalin]      Reaction: Facial Parathesia (numbness)     Moxifloxacin Hcl In Nacl      Reaction: Anxiety    Neurontin [Gabapentin]      Reaction: Rash and itching       Social History  Social History     Socioeconomic History    Marital status:      Spouse name: Not on file    Number of children: 2    Years of education: Not on file    Highest education level: Not on file   Occupational History    Occupation: retired   Tobacco Use    Smoking status: Former     Packs/day: 0.25     Years: 0.50     Pack years: 0.13     Types: Cigars, Cigarettes     Quit date: 2022     Years since quittin.8    Smokeless tobacco: Never   Vaping Use    Vaping Use: Never used   Substance and Sexual Activity    Alcohol use: Not Currently    Drug use: Never    Sexual activity: Not on file   Other Topics Concern    Not on file   Social History Narrative    Patient is  - has chronic low back pain     Social Determinants of Health     Financial Resource Strain: Not on file   Food Insecurity: Not on file   Transportation Needs: Not on file   Physical Activity: Not on file   Stress: Not on file   Social Connections: Not on file   Interpersonal Safety: Not on file   Housing Stability: Not on file       Family History  Family History   Problem Relation Age of Onset    Emphysema Mother     Lung Cancer Father     Anesthesia Reaction No family hx of     Thrombosis No family hx of        Review of Systems  The complete review of systems is negative other than noted in the HPI or here.   Anesthesia  "Evaluation   Pt has had prior anesthetic.     No history of anesthetic complications       ROS/MED HX  ENT/Pulmonary: Comment: Left lung nodule    (+) sleep apnea, doesn't use CPAP,              tobacco use, Past use,        COPD,              Neurologic:  - neg neurologic ROS     Cardiovascular:     (+) Dyslipidemia hypertension- -   -  - -           VALLECILLO.                      Previous cardiac testing   Echo: Date: Results:    Stress Test:  Date: 2/2020 Results:  Narrative & Impression     The nuclear stress test is probably negative for inducible myocardial ischemia or infarction. Study limited by motion artifact.     Saddle pattern in V2 concerning for Type 2 Brugada.     The left ventricular ejection fraction at stress is 69%.     There is no prior study for comparison.       ECG Reviewed:  Date: 7/2023 Results:  See H&P  Cath:  Date: Results:   (-) orthopnea/PND   METS/Exercise Tolerance: 3 - Able to walk 1-2 blocks without stopping Comment: Is going to pulmonary rehab.  Does the NuStep for 10 minutes at a moderate pace.  Does 8 minutes on the treadmill at 1.1 and gets SOB   Hematologic:     (+)      anemia,          Musculoskeletal: Comment: Chronic low back pain  Neck pain \"acting up\"      GI/Hepatic:     (+) GERD, Asymptomatic on medication,                  Renal/Genitourinary:     (+) renal disease, type: CRI,            Endo:     (+)               Obesity,       Psychiatric/Substance Use:     (+) psychiatric history anxiety and depression  H/O chronic opiod use .     Infectious Disease:  - neg infectious disease ROS     Malignancy:  - neg malignancy ROS     Other:  - neg other ROS    (+)  , H/O Chronic Pain,         /70 (BP Location: Right arm, Patient Position: Sitting, Cuff Size: Adult Large)   Pulse 51   Temp 98.2  F (36.8  C) (Oral)   Resp 16   Ht 1.803 m (5' 11\")   Wt 106.1 kg (234 lb)   SpO2 96%   BMI 32.64 kg/m      Physical Exam   Constitutional: Awake, alert, cooperative, no apparent " distress, and appears stated age.  Eyes: Pupils equal, round and reactive to light, extra ocular muscles intact, sclera clear, conjunctiva normal.  HENT: Normocephalic, oral pharynx with moist mucus membranes, good dentition. No goiter appreciated.   Respiratory: Clear to auscultation bilaterally, no crackles or wheezing.  Cardiovascular: Regular rate and rhythm, normal S1 and S2, and no murmur noted.  Carotids +2, no bruits. No edema. Palpable pulses to radial  DP and PT arteries.   GI: Normal bowel sounds, soft, non-distended, non-tender, no masses palpated, no hepatosplenomegaly.    Lymph/Hematologic: No cervical lymphadenopathy and no supraclavicular lymphadenopathy.  Genitourinary:  deferred  Skin: Warm and dry.  No rashes at anticipated surgical site.   Musculoskeletal: Full ROM of neck. There is no redness, warmth, or swelling of the exposed joints. Gross motor strength is normal.    Neurologic: Awake, alert, oriented to name, place and time. Cranial nerves II-XII are grossly intact. Gait is normal.   Neuropsychiatric: Calm, cooperative. Normal affect.     Prior Labs/Diagnostic Studies   All labs and imaging personally reviewed     EKG/ stress test - if available please see in ROS above     EKG  7/2023          Latest Ref Rng & Units 9/18/2023    12:20 PM   PFT   FVC L 4.84    FEV1 L 2.90    FVC% % 113    FEV1% % 87          The patient's records and results personally reviewed by this provider.     Outside records reviewed from: Care Everywhere    LAB/DIAGNOSTIC STUDIES TODAY:    Component      Latest Ref Rng 9/22/2023  2:12 PM   Sodium      136 - 145 mmol/L 133 (L)    Potassium      3.4 - 5.3 mmol/L 4.8    Chloride      98 - 107 mmol/L 98    Carbon Dioxide (CO2)      22 - 29 mmol/L 26    Anion Gap      7 - 15 mmol/L 9    Urea Nitrogen      8.0 - 23.0 mg/dL 12.2    Creatinine      0.67 - 1.17 mg/dL 1.30 (H)    Calcium      8.8 - 10.2 mg/dL 9.2    Glucose      70 - 99 mg/dL 94    GFR Estimate      >60  mL/min/1.73m2 62    WBC      4.0 - 11.0 10e3/uL 5.8    RBC Count      4.40 - 5.90 10e6/uL 4.19 (L)    Hemoglobin      13.3 - 17.7 g/dL 12.4 (L)    Hematocrit      40.0 - 53.0 % 37.0 (L)    MCV      78 - 100 fL 88    MCH      26.5 - 33.0 pg 29.6    MCHC      31.5 - 36.5 g/dL 33.5    RDW      10.0 - 15.0 % 11.9    Platelet Count      150 - 450 10e3/uL 213       Legend:  (L) Low  (H) High    Assessment    Alex Frost is a 62 year old male seen as a PAC referral for risk assessment and optimization for anesthesia.    Plan/Recommendations  Pt will be optimized for the proposed procedure.  See below for details on the assessment, risk, and preoperative recommendations    NEUROLOGY  - No history of TIA, CVA or seizure    - chronic pain on morphine and oxycodone - see PharmD note    -Post Op delirium risk factors:  No risk identified    ENT  - No current airway concerns.  Will need to be reassessed day of surgery.  Mallampati: II  TM: > 3    CARDIAC  - No history of Afib  - recent chest CT noted only mild coronary artery calcifications.  - hold candesartan DOS    - prior stress test in 2020 noted saddle pattern concerning for Brugada syndrome.  The following was a note from cardiology afterwards:    Dr. Edmond (2/19/20)  I was asked by Dr. Funez to review EKGs with consideration of Brugada syndrome  Abnormal EKG since at least 2002 with some ST elevation in V1 consistent with Brugada syndrome  EKG pattern is not changed much over the years and is seen on stress scan  Stress test shows no ischemia or infarct with preserved ejection fraction without arrhythmia   unless patient has a history of arrhythmic like syncope or a worrisome family history of early sudden death would not be very concerned about this EKG pattern.        - METS (Metabolic Equivalents)  Patient performs 4 or more METS exercise without symptoms            Total Score: 0      RCRI-Low risk: Class 2 0.9% complication rate            Total Score: 1     "RCRI: High Risk Surgery        PULMONARY  - Obstructive Sleep Apnea  SUDARSHAN without home CPAP use.    - COPD  Well controlled  - Tobacco History        GI  - GERD is well controlled with pepcid.  PONV Medium Risk  Total Score: 2           1 AN PONV: Patient is not a current smoker    1 AN PONV: Intended Post Op Opioids        Renal  - creatinine has been elevated now x 2.  Monitor closely during admission.     ENDOCRINE    - BMI: Estimated body mass index is 32.64 kg/m  as calculated from the following:    Height as of this encounter: 1.803 m (5' 11\").    Weight as of this encounter: 106.1 kg (234 lb).  Obesity (BMI >30)  - No history of Diabetes Mellitus    HEME  VTE High Risk 3%            Total Score: 8    VTE: Greater than 59 yrs old    VTE: Male    VTE: Current cancer      - No history of abnormal bleeding or antiplatelet use.  - Chronic anemia  Recommend perioperative use of blood conservation techniques intraoperatively and close monitoring for postoperative bleeding.  A type and screen has been ordered for this patient    MSK  - chronic neck and back pain. Consider cautious positioning.     PSYCH  - continue mental health medications without interruption.    Other  - deferred ERAS order for gabapentin due to noted allergy.        Different anesthesia methods/types have been discussed with the patient, but they are aware that the final plan will be decided by the assigned anesthesia provider on the date of service.  Patient was discussed with Dr Otero    The patient is optimized for their procedure. AVS with information on surgery time/arrival time, meds and NPO status given by nursing staff. No further diagnostic testing indicated.      On the day of service:     Prep time: 17 minutes  Visit time:  17 minutes  Documentation/communication time: 23 minutes  ------------------------------------------  Total time: 57 minutes      NIKITA Hightower CNP  Preoperative Assessment Center  [facility] [place]  [facility]  Phone: [phone]  Fax: [phone]

## 2023-09-22 NOTE — ANESTHESIA PREPROCEDURE EVALUATION
Anesthesia Pre-Procedure Evaluation    Patient: Alex Frost   MRN: [id number] : 1960        Procedure : Procedure(s):  LEFT THORACOSCOPIC UPPER LOBE WEDGE RESECTION POSSIBLE SEGMENTECTOMY  PAC EVALUATION       Past Medical History:   Diagnosis Date    Anxiety     Cigar smoker     COPD (chronic obstructive pulmonary disease) (H)     GERD (gastroesophageal reflux disease)     Hyperlipidemia     Hypertension       Past Surgical History:   Procedure Laterality Date    APPENDECTOMY      CERVICAL LAMINECTOMY      partial x 2    IR LUMBAR EPIDURAL STEROID INJECTION  10/09/2002    TONSILLECTOMY        Allergies   Allergen Reactions    Amitriptyline      Reaction: Racing heart (tachycardia)     Lisinopril      Reaction: Facial Parathesia (numbness)    Lyrica [Pregabalin]      Reaction: Facial Parathesia (numbness)     Moxifloxacin Hcl In Nacl      Reaction: Anxiety    Neurontin [Gabapentin]      Reaction: Rash and itching      Social History     Tobacco Use    Smoking status: Former     Packs/day: 0.25     Years: 0.50     Pack years: 0.13     Types: Cigars, Cigarettes     Quit date: 2022     Years since quittin.8    Smokeless tobacco: Never   Substance Use Topics    Alcohol use: Not Currently      Wt Readings from Last 1 Encounters:   23 106.1 kg (234 lb)        Anesthesia Evaluation   Pt has had prior anesthetic. Type: General.    No history of anesthetic complications       ROS/MED HX  ENT/Pulmonary: Comment: Left lung nodule    PFT : Impression:  Full Pulmonary Function Test is abnormal.  PFTs are consistent with moderate-severe obstructive disease.       (+) sleep apnea, doesn't use CPAP,              tobacco use, Past use,        COPD,              Neurologic:  - neg neurologic ROS     Cardiovascular:     (+) Dyslipidemia hypertension- -   -  - -           VALLECILLO.                      Previous cardiac testing   Echo: Date: Results:    Stress Test:  Date: 2020 Results:  Narrative & Impression      "The nuclear stress test is probably negative for inducible myocardial ischemia or infarction. Study limited by motion artifact.     Saddle pattern in V2 concerning for Type 2 Brugada.     The left ventricular ejection fraction at stress is 69%.     There is no prior study for comparison.       ECG Reviewed:  Date: 7/2023 Results:  Sinus bradycardia    Abnormal EKG since at least 2002 with some ST elevation in V1 consistent with Brugada syndrome  Cath:  Date: Results:   (-) orthopnea/PND   METS/Exercise Tolerance: 3 - Able to walk 1-2 blocks without stopping Comment: Is going to pulmonary rehab.  Does the NuStep for 10 minutes at a moderate pace.  Does 8 minutes on the treadmill at 1.1 and gets SOB   Hematologic:     (+)      anemia,          Musculoskeletal: Comment: Chronic low back pain  Neck pain \"acting up\"      GI/Hepatic:     (+) GERD, Asymptomatic on medication,                  Renal/Genitourinary:     (+) renal disease, type: CRI,            Endo:     (+)               Obesity,       Psychiatric/Substance Use:     (+) psychiatric history anxiety and depression  H/O chronic opiod use .     Infectious Disease:  - neg infectious disease ROS     Malignancy:  - neg malignancy ROS     Other:  - neg other ROS    (+)  , H/O Chronic Pain,         Physical Exam    Airway        Mallampati: I    Neck ROM: limited     Respiratory Devices and Support         Dental           Cardiovascular   cardiovascular exam normal          Pulmonary           (+) wheezes           OUTSIDE LABS:  CBC:   Lab Results   Component Value Date    WBC 5.9 02/18/2020    WBC 6.2 01/10/2018    HGB 13.5 09/18/2023    HGB 13.6 (L) 04/12/2021    HCT 34.7 (L) 02/18/2020    HCT 39.2 (L) 01/10/2018     02/18/2020     01/10/2018     BMP:   Lab Results   Component Value Date     07/13/2023     11/02/2022    POTASSIUM 4.7 07/13/2023    POTASSIUM 4.5 11/02/2022    CHLORIDE 101 07/13/2023    CHLORIDE 100 11/02/2022    CO2 28 " 07/13/2023    CO2 27 11/02/2022    BUN 10.7 07/13/2023    BUN 10.0 11/02/2022    CR 1.30 (H) 07/13/2023    CR 0.97 11/02/2022     (H) 08/03/2023     (H) 07/13/2023     COAGS:   Lab Results   Component Value Date    PTT 28 01/05/2018    INR 1.02 01/05/2018     POC: No results found for: BGM, HCG, HCGS  HEPATIC:   Lab Results   Component Value Date    ALBUMIN 4.7 07/13/2023    PROTTOTAL 6.5 07/13/2023    ALT 18 07/13/2023    AST 20 07/13/2023    ALKPHOS 55 07/13/2023    BILITOTAL 0.2 07/13/2023     OTHER:   Lab Results   Component Value Date    CARLTON 9.3 07/13/2023    LIPASE <9 02/18/2020       Anesthesia Plan    ASA Status:  3    NPO Status:  NPO Appropriate    Anesthesia Type: General.     - Airway: ETT   Induction: Intravenous, Propofol.   Maintenance: Balanced.   Techniques and Equipment:     - Airway: Double lumen ETT, Shoulder Louise/Ramp, Fiberoptic Bronchoscope     - Lines/Monitors: 2nd IV, Arterial Line, BIS     - Drips/Meds: Phenylephrine, Ketamine, Dexmed. bolus     Consents    Anesthesia Plan(s) and associated risks, benefits, and realistic alternatives discussed. Questions answered and patient/representative(s) expressed understanding.     - Discussed: Risks, Benefits and Alternatives for BOTH SEDATION and the PROCEDURE were discussed     - Discussed with:  Patient       Use of blood products discussed: Yes.     - Discussed with: Patient.     Postoperative Care    Pain management: Oral pain medications, Multi-modal analgesia, IV analgesics.   PONV prophylaxis: Ondansetron (or other 5HT-3), Dexamethasone or Solumedrol     Comments:              PAC Discussion and Assessment                                                      PAC Pharmacist Assessment: PREOPERATIVE PAIN CONSULT FOR POSTOPERATIVE PAIN MANAGEMENT  Alex Frost was interviewed via phone on September 22, 2023 prior to PAC Clinic appointment. Patient is preparing for the planned procedure with Dr. Gotti on 10/2/23 at [facility]  [facility] for LEFT THORACOSCOPIC UPPER LOBE WEDGE RESECTION POSSIBLE SEGMENTECTOMY.  These recommendations are intended for patients admitted to the hospital after a procedure and are only valid for 30 days from the date of service. If there are significant changes in opioid dosing between today and day of procedure the below recommendations may have to be adjusted.      OUTPATIENT PRIOR TO ADMISSION MEDICATIONS (related to pain management):  -- Long-acting opioid: Morphine extended release (MS Contin) 15 mg tablets-take 30 mg (2 tablets) at bedtime  -- Short-acting opioid: Oxycodone 15 mg tablets take 1 tablet 5 times a day while awake (scheduled)  -- Oral adjuvant(s): Ibuprofen 600 mg every 8 hours as needed for pain  -- Topicals: None  -- Bowel regimen: Senna-5 tablets at bedtime, docusate 200 mg at bedtime, Movantik 25 mg every morning  -- Other relevant medications: Nasal Sonam 2 mg in the morning and 1 mg in the afternoon    Verbal consent was given by patient to access pharmacy records and [place] Prescription Monitoring Profile: Yes  Outpatient opioids prescribed by Kjersten Duire, PA ([facility]).   Outpatient opioid oral morphine equivalent (OME): 142 mg OME/day     RECOMMENDATIONS:   The following pain management recommendations are made based on information from today's visit and should not replace medical decision-making based on patient condition at the time of procedure or postoperatively.      - PREOPERATIVE:  + Before procedure recommend acetaminophen 975 mg PO in pre-op (Written in pre-op by PAC MARIA ALEJANDRA)    - INTRAOPERATIVE (Anesthesiologist/CRNA to consider):   + Regional anesthesia - Defer to RAPS team   + Ketamine IV intraoperatively   + Avoid remifentanil - to reduce risk of developing hyperalgesia    - POSTOPERATIVE INPATIENT MANAGEMENT:  Opioid analgesic:  + If able to take oral medications (preferred):           -- Continue home dose of morphine ER (MS Contin) 30  mg every evening at bedtime (hold or reduce dose as needed if patient has s/sx sedation or respiratory depression)          -- Start oxycodone 15 to 20 mg every 3 hours as needed for moderate to severe pain          -- Start hydromorphone IV 0.3-0.5 mg IV every 2 hr PRN breakthrough pain not controlled by PO medication or prior to significant activity (eg. PT/OT sessions).       + If unable to take oral medications (or primary team prefers PCA initially postop):           -- Continue home dose of morphine ER (MS Contin) 30 mg every evening at bedtime (hold or reduce dose as needed if patient has s/sx sedation or respiratory depression)         --HYDROmorphone (Dilaudid) PCA recommended dose range 0.2-0.4 mg Q 10 min lockout interval with NO continuous rate. Start with 0.3 mg PCA dose Q 10 min lockout interval. If necessary for pain control and improvement in physical function, notify provider for PCA dose increase orders per recommended dose range. Hold or reduce dose for sedation.            + Note: if neuraxial opioid or other long acting opioid (eg. Methadone) is given contact on-call pain provider for adjustment in opioid plan    Nonopioid analgesics:  + Regional anesthesia per RAPS team.   + Start acetaminophen 975 mg PO every 6 hr scheduled if no concern about masking fevers  + Avoid gabapentin and Lyrica given previous reaction   + When okay per surgery initiate scheduled oral NSAID  + Heat/ice PRN  + Initiate lidocaine 4% patch - 1-3 patches every 24 hours scheduled (12 hr on 12 hr off). Apply to intact skin only.  Do not start if long acting nerve block given.     Muscle Relaxant:   + Start methocarbamol 500 mg every 6 hours as needed for muscle spasms (hold for sedation)    Stool softeners/Laxatives:   + When appropriate start senna-docusate 2 tabs PO BID and Miralax 17 g daily to prevent opioid induced constipation.   + Continue pts home movantik 25 mg daily in the morning.     Other:  + Please consult  the inpatient pain management service for postoperative pain management recommendations.     + Ketamine IV infusion.  If needed for acute postop uncontrolled pain, the primary service may decide to start ketamine infusion at 5 mg/hr and increase to 7.5 mg/hr if tolerating.  Ketamine for acute pain management will be used as an analgesic medication in addition to opioids when usual analgesics are suboptimal.Only start ketamine IV if patient is stable from a cardiovascular standpoint.  Low dose ketamine may be administered on a regular nursing unit according to [facility] ketamine-low dose policy.  Please see policy for details on contraindications, adverse effects and monitoring.  Of note, sialorrhea is another potential side effect and must weight benefit/risk if patient having trouble protecting airway.  [email or web address]     + Recommend close monitoring of respiratory status postoperatively with capnography and continuous SpO2 monitoring. Would recommend continuing capnography beyond the usual 24 hr due to patient's opioid tolerance. This is particularly important given patient's untreated (mild) SUDARSHAN.    -------------------------------------------------------------------------------------------------------------------  ASSESSMENT:    Alex Frost has a history of chronic low back pain and lung nodule and is preparing for above mentioned surgery.  Today, Alex is interviewed in person in PAC clinic in the presence of his wife.  Patient is withdrawn at times during interview but is alert, oriented and answers questions appropriately.  Alex endorses his pain is primarily in his low back and is a dull ache.  He has a history of 2 partial laminectomies in his neck at C5-6 and C6-7, but denies any surgeries on his low back.   His low back pain does not radiate into his lower extremities.  He states that he has been on chronic opioid therapy for  over 10 years, and of note, he is also on chronic benzodiazepine therapy with clonazepam.  He rates his chronic back pain as a 3 out of 10 on a 0-10 numeric scale on an average day.  He endorses about a 1-2 block walking tolerance before he would have to take a rest due to pain or fatigue.  He does not identify any particular aggravating factors for his back pain.  Relieving factors include rest and his pain medications as well as stretching.  He takes his medications for pain on a schedule.  He takes the oxycodone 15 mg every 2-1/2 to 3 hours throughout the day while he is awake and then he takes his MS Contin 30 mg at bedtime.  He states that he sleeps well at night.  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  He he reports that this is a mild case and he does not use a CPAP device.  He denies any issues with sedation from his opioid regimen.  He does have issues with opioid-induced constipation for which she is on a aggressive bowel regimen and has bowel movements every other day.  He endorses very rare use of alcohol.  He denies any recreational drug use or opioid abuse history.  He is a former smoker and quit about a year ago.  He is open to a multimodal pain management as outlined above, and all questions were answered to patient's apparent satisfaction.    Other pain therapies tried in the past (not an all inclusive list):   Dilaudid-tolerates  Oxycodone-tolerates, prefers to stay on this  Morphine-tolerates, effective    Pain therapies never tried (not an all inclusive list):   PCA - patient has not received  PCA in the past, but open to using this if necessary postoperatively   Ketamine - denies PTSD, BPAD, schizophrenia, psychiatric disorders (eg. hallucinations/delirium), history of brain cancer, cardiac failure, previous CVA and increased IOP/glaucoma and is open to using ketamine for pain control.       If immediate assistance is needed please contact the pain service at the number below.     David Win,  "[facility]  September 22, 2023  1:22 PM    If questions or concerns, please contact the Inpatient Pain Service via [facility]: \"PAIN MANAGEMENT ACUTE INPATIENT/ [facility] or [facility] (depending on location of patient)    Reviewed and Signed by PAC Pharmacist  Pharmacist: JORDAN  Date: 9/22/23  Time: 1338   David Win RPH  "

## 2023-09-22 NOTE — PATIENT INSTRUCTIONS
Preparing for Your Surgery      Name:  Alex Frost   MRN:  [id number]   :  1960   Today's Date:  2023       Arriving for surgery:  Surgery date:  10/2/23  Arrival time:  5:30AM    Please come to:     Please come to:      [facility] [address]  [address] [place]  [address]      The [facility] Patient /Visitor Ramp is located at [address]. Patients and visitors who self-park will receive the reduced hospital parking rate. If the Patient /Visitor Ramp is full, please follow the signs to the [employer] parking located at the main hospital entrance.     parking is available ( 24 hours/ 7 days a week)    Discounted parking pass options are available for patients and visitors. They can be purchased at the [employer] desk at the main hospital entrance.    -    Stop at the security desk and they will direct surgery patients to the 3rd floor Surgery Waiting Room. [phone]     -  If you are in need of directions, wheelchair or escort please stop at the Information/security desk in the lobby.       What can I eat or drink?  -  You may eat and drink normally up to 8 hours prior to arrival time. (Until 10/1/23, 9:30PM)  -  You may have clear liquids until 2 hours prior to arrival time. (Until 10/2/23, 3:30AM)    Examples of clear liquids:  Water  Clear broth  Juices (apple, white grape, white cranberry  and cider) without pulp  Noncarbonated, powder based beverages  (lemonade and Fransisco-Aid)  Sodas (Sprite, 7-Up, ginger ale and seltzer)  Coffee or tea (without milk or cream)  Gatorade    -  No Alcohol or cannabis products for at least 24 hours before surgery.     Which medicines can I take?    Hold Aspirin for 7 days before surgery.   Hold Multivitamins for 7 days before surgery.  Hold Supplements, Fish oilfor 7 days before surgery.  Hold Ibuprofen (Advil, Motrin) for 1 day(s) before surgery--unless otherwise directed by  surgeon.  Hold Naproxen (Aleve) for 4 days before surgery.    -  DO NOT take these medications the day of surgery:    Candesartan(Atacand)    Vitamin D    Lactulose      -  PLEASE TAKE these medications the day of surgery:    Albuterol Inhaler as needed and bring the day of surgery    Atorvastatin(Lipitor)    Clonazepam(Klonopin) as needed    Morphine(Laura)    Oxycodone(Oxycontin) 30 mg    Oxycodone 15 mg    Paroxetine(Paxil)    Propranolol    Risperidone(Risperdal)    Trelegy Ellipta Inhaler        How do I prepare myself?  - Please take 2 showers (one the night prior to surgery and one the morning of surgery) using Scrubcare or Hibiclens soap.    Use this soap only from the neck to your toes.     Leave the soap on your skin for one minute--then rinse thoroughly.      You may use your own shampoo and conditioner. No other hair products.   - Please remove all jewelry and body piercings.  - No lotions, deodorants or fragrance.  - Bring your ID and insurance card.    -If you have a Deep Brain Stimulator, Spinal Cord Stimulator, or any Neuro Stimulator device---you must bring the remote control to the hospital.    Enhanced Recovery After Surgery     This is a team effort, including you, to get you back on your feet, eating and drinking      normally and out of the hospital as quickly as possible.  The goals are: 1) NO INFECTIONS and   2) RETURN TO NORMAL DIET    How can we achieve these goals?  1) STAY ACTIVE: Walk every day before your surgery; try to increase the amount every day.  Walk after surgery as much as you can-the nurses will help you.  Walking speeds healing and gets you home quicker, you heal better at home and have less risk of infection.     2) INCENTIVE SPIROMETER: Practice your incentive spirometer 4 times per day with 5 repetitions each time.  Using the incentive spirometer can strengthen your muscles between your ribs and help you have a strong cough after surgery.  A more effective cough can help  prevent problems with your lungs.    3) STAY HYDRATED: Drink clear liquids up until 2 hours prior to arrival. We would like you to purchase a drink such as Gatorade or Ensure Clear (not the milkshake type).  Drink this before bedtime and the morning of surgery, drink between 8-10 ounces or until you feel hydrated.  Keeping well hydrated leads to your veins being plump, you wake up faster, and you are less likely to be nauseated. Start drinking water as soon as you can after surgery and advance to clear liquids and food as tolerated.  IV fluids contain salt, drinking fluids will minimize the amount of IV fluids you need and decrease the amount of salt you get.    The most common reason for the patient to be readmitted is dehydration. Staying hydrated after you go home from the hospital is very important.  Ensure or Ensure Clear are good options to keep you hydrated.     4) PAIN MANAGEMENT: If we minimize the amount of opioids and narcotics, and use regional blocks (which numb the area where your surgery is) along with oral pain medications; you will have less side effects of nausea and constipation. Narcotics can slow down your bowels and cause you to stay in the hospital longer.     Our goal is to keep you comfortable; eating and drinking normally and back home safely.             Covid testing policy as of 12/06/2022  Your surgeon will notify and schedule you for a COVID test if one is needed before surgery--please direct any questions or COVID symptoms to your surgeon      Questions or Concerns:    - For any questions regarding the day of surgery or your hospital stay, please contact the Pre Admission Nursing Office at [phone].       - If you have health changes between today and your surgery, please call your surgeon.       - For questions after surgery, please call your surgeons office.           Current Visitor Guidelines    You may have 2 visitors in the pre op area.    Visiting hours: 8 a.m. to 8:30  p.m.    You may have four visitors during your inpatient hospital stay.    Patients confirmed or suspected to have symptoms of COVID 19 or flu:     No visitors allowed for adult patients.   Children (under age 18) can have 1 named visitor.     People who are sick or showing symptoms of COVID 19 or flu:    Are not allowed to visit patients--we can only make exceptions in special situations.       Please follow these guidelines for your visit:          Please maintain social distance          Masking is optional--however at times you may be asked to wear a mask for the safety of yourself and others     Clean your hands with alcohol hand . Do this when you arrive at and leave the building and patient room,    And again after you touch your mask or anything in the room.     Go directly to and from the room you are visiting.     Stay in the patient s room during your visit. Limit going to other places in the hospital as much as possible     Leave bags and jackets at home or in the car.     For everyone s health, please don t come and go during your visit. That includes for smoking   during your visit.

## 2023-09-22 NOTE — PHARMACY - PREOPERATIVE ASSESSMENT CENTER
PREOPERATIVE PAIN CONSULT FOR POSTOPERATIVE PAIN MANAGEMENT  Alex Frost was interviewed via phone on September 22, 2023 prior to PAC Clinic appointment. Patient is preparing for the planned procedure with Dr. Gotti on 10/2/23 at the [facility] for LEFT THORACOSCOPIC UPPER LOBE WEDGE RESECTION POSSIBLE SEGMENTECTOMY.  These recommendations are intended for patients admitted to the hospital after a procedure and are only valid for 30 days from the date of service. If there are significant changes in opioid dosing between today and day of procedure the below recommendations may have to be adjusted.      OUTPATIENT PRIOR TO ADMISSION MEDICATIONS (related to pain management):  -- Long-acting opioid: Morphine extended release (MS Contin) 15 mg tablets-take 30 mg (2 tablets) at bedtime  -- Short-acting opioid: Oxycodone 15 mg tablets take 1 tablet 5 times a day while awake (scheduled)  -- Oral adjuvant(s): Ibuprofen 600 mg every 8 hours as needed for pain  -- Topicals: None  -- Bowel regimen: Senna-5 tablets at bedtime, docusate 200 mg at bedtime, Movantik 25 mg every morning  -- Other relevant medications: Nasal Sonam 2 mg in the morning and 1 mg in the afternoon    Verbal consent was given by patient to access pharmacy records and [place] Prescription Monitoring Profile: Yes  Outpatient opioids prescribed by Kjersten Duire, PA ([facility]).   Outpatient opioid oral morphine equivalent (OME): 142 mg OME/day     RECOMMENDATIONS:   The following pain management recommendations are made based on information from today's visit and should not replace medical decision-making based on patient condition at the time of procedure or postoperatively.      - PREOPERATIVE:  + Before procedure recommend acetaminophen 975 mg PO in pre-op (Written in pre-op by PAC MARIA ALEJANDRA)    - INTRAOPERATIVE (Anesthesiologist/CRNA to consider):   + Regional anesthesia - Defer to RAPS team   + Ketamine IV  intraoperatively   + Avoid remifentanil - to reduce risk of developing hyperalgesia    - POSTOPERATIVE INPATIENT MANAGEMENT:  Opioid analgesic:  + If able to take oral medications (preferred):           -- Continue home dose of morphine ER (MS Contin) 30 mg every evening at bedtime (hold or reduce dose as needed if patient has s/sx sedation or respiratory depression)          -- Start oxycodone 15 to 20 mg every 3 hours as needed for moderate to severe pain          -- Start hydromorphone IV 0.3-0.5 mg IV every 2 hr PRN breakthrough pain not controlled by PO medication or prior to significant activity (eg. PT/OT sessions).       + If unable to take oral medications (or primary team prefers PCA initially postop):           -- Continue home dose of morphine ER (MS Contin) 30 mg every evening at bedtime (hold or reduce dose as needed if patient has s/sx sedation or respiratory depression)         --HYDROmorphone (Dilaudid) PCA recommended dose range 0.2-0.4 mg Q 10 min lockout interval with NO continuous rate. Start with 0.3 mg PCA dose Q 10 min lockout interval. If necessary for pain control and improvement in physical function, notify provider for PCA dose increase orders per recommended dose range. Hold or reduce dose for sedation.            + Note: if neuraxial opioid or other long acting opioid (eg. Methadone) is given contact on-call pain provider for adjustment in opioid plan    Nonopioid analgesics:  + Regional anesthesia per RAPS team.   + Start acetaminophen 975 mg PO every 6 hr scheduled if no concern about masking fevers  + Avoid gabapentin and Lyrica given previous reaction   + When okay per surgery initiate scheduled oral NSAID  + Heat/ice PRN  + Initiate lidocaine 4% patch - 1-3 patches every 24 hours scheduled (12 hr on 12 hr off). Apply to intact skin only.  Do not start if long acting nerve block given.     Muscle Relaxant:   + Start methocarbamol 500 mg every 6 hours as needed for muscle spasms  (hold for sedation)    Stool softeners/Laxatives:   + When appropriate start senna-docusate 2 tabs PO BID and Miralax 17 g daily to prevent opioid induced constipation.   + Continue pts home movantik 25 mg daily in the morning.     Other:  + Please consult the inpatient pain management service for postoperative pain management recommendations.     + Ketamine IV infusion.  If needed for acute postop uncontrolled pain, the primary service may decide to start ketamine infusion at 5 mg/hr and increase to 7.5 mg/hr if tolerating.  Ketamine for acute pain management will be used as an analgesic medication in addition to opioids when usual analgesics are suboptimal.Only start ketamine IV if patient is stable from a cardiovascular standpoint.  Low dose ketamine may be administered on a regular nursing unit according to [facility] ketamine-low dose policy.  Please see policy for details on contraindications, adverse effects and monitoring.  Of note, sialorrhea is another potential side effect and must weight benefit/risk if patient having trouble protecting airway.  [email or web address]     + Recommend close monitoring of respiratory status postoperatively with capnography and continuous SpO2 monitoring. Would recommend continuing capnography beyond the usual 24 hr due to patient's opioid tolerance. This is particularly important given patient's untreated (mild) SUDARSHAN.    -------------------------------------------------------------------------------------------------------------------  ASSESSMENT:    Alex Frost has a history of chronic low back pain and lung nodule and is preparing for above mentioned surgery.  Today, Alex is interviewed in person in PAC clinic in the presence of his wife.  Patient is withdrawn at times during interview but is alert, oriented and answers questions appropriately.  Alex endorses his pain is primarily in his low back and is  a dull ache.  He has a history of 2 partial laminectomies in his neck at C5-6 and C6-7, but denies any surgeries on his low back.   His low back pain does not radiate into his lower extremities.  He states that he has been on chronic opioid therapy for over 10 years, and of note, he is also on chronic benzodiazepine therapy with clonazepam.  He rates his chronic back pain as a 3 out of 10 on a 0-10 numeric scale on an average day.  He endorses about a 1-2 block walking tolerance before he would have to take a rest due to pain or fatigue.  He does not identify any particular aggravating factors for his back pain.  Relieving factors include rest and his pain medications as well as stretching.  He takes his medications for pain on a schedule.  He takes the oxycodone 15 mg every 2-1/2 to 3 hours throughout the day while he is awake and then he takes his MS Contin 30 mg at bedtime.  He states that he sleeps well at night.  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  He he reports that this is a mild case and he does not use a CPAP device.  He denies any issues with sedation from his opioid regimen.  He does have issues with opioid-induced constipation for which she is on a aggressive bowel regimen and has bowel movements every other day.  He endorses very rare use of alcohol.  He denies any recreational drug use or opioid abuse history.  He is a former smoker and quit about a year ago.  He is open to a multimodal pain management as outlined above, and all questions were answered to patient's apparent satisfaction.    Other pain therapies tried in the past (not an all inclusive list):   Dilaudid-tolerates  Oxycodone-tolerates, prefers to stay on this  Morphine-tolerates, effective    Pain therapies never tried (not an all inclusive list):   PCA - patient has not received  PCA in the past, but open to using this if necessary postoperatively   Ketamine - denies PTSD, BPAD, schizophrenia, psychiatric disorders (eg.  "hallucinations/delirium), history of brain cancer, cardiac failure, previous CVA and increased IOP/glaucoma and is open to using ketamine for pain control.       If immediate assistance is needed please contact the pain service at the number below.     David [facility]  September 22, 2023  1:22 PM    If questions or concerns, please contact the Inpatient Pain Service via [facility]: \"PAIN MANAGEMENT ACUTE INPATIENT/ [facility] or [facility] (depending on location of patient)    "

## 2023-09-26 ENCOUNTER — HOSPITAL ENCOUNTER (OUTPATIENT)
Dept: CARDIAC REHAB | Facility: CLINIC | Age: 63
Discharge: HOME | End: 2023-09-26
Attending: FAMILY MEDICINE
Payer: COMMERCIAL

## 2023-09-26 PROCEDURE — 94625 PHY/QHP OP PULM RHB W/O MNTR: CPT

## 2023-09-28 ENCOUNTER — HOSPITAL ENCOUNTER (OUTPATIENT)
Dept: CARDIAC REHAB | Facility: CLINIC | Age: 63
Discharge: HOME | End: 2023-09-28
Attending: FAMILY MEDICINE
Payer: COMMERCIAL

## 2023-09-28 PROCEDURE — 94625 PHY/QHP OP PULM RHB W/O MNTR: CPT

## 2023-09-29 DIAGNOSIS — R91.1 LUNG NODULE: Primary | ICD-10-CM

## 2023-09-29 ASSESSMENT — LIFESTYLE VARIABLES: TOBACCO_USE: 1

## 2023-09-29 ASSESSMENT — ENCOUNTER SYMPTOMS: ORTHOPNEA: 0

## 2023-09-29 ASSESSMENT — COPD QUESTIONNAIRES: COPD: 1

## 2023-10-01 RX ORDER — ONDANSETRON 4 MG/1
4 TABLET, ORALLY DISINTEGRATING ORAL EVERY 30 MIN PRN
Status: CANCELLED | OUTPATIENT
Start: 2023-10-01

## 2023-10-01 RX ORDER — ONDANSETRON 2 MG/ML
4 INJECTION INTRAMUSCULAR; INTRAVENOUS EVERY 30 MIN PRN
Status: CANCELLED | OUTPATIENT
Start: 2023-10-01

## 2023-10-01 RX ORDER — OXYCODONE HYDROCHLORIDE 5 MG/1
5 TABLET ORAL
Status: CANCELLED | OUTPATIENT
Start: 2023-10-01

## 2023-10-01 RX ORDER — OXYCODONE HYDROCHLORIDE 10 MG/1
10 TABLET ORAL
Status: CANCELLED | OUTPATIENT
Start: 2023-10-01

## 2023-10-02 ENCOUNTER — ANESTHESIA (OUTPATIENT)
Dept: SURGERY | Facility: CLINIC | Age: 63
End: 2023-10-02
Payer: COMMERCIAL

## 2023-10-02 ENCOUNTER — HOSPITAL ENCOUNTER (INPATIENT)
Facility: CLINIC | Age: 63
LOS: 5 days | Discharge: HOME | End: 2023-10-07
Attending: THORACIC SURGERY (CARDIOTHORACIC VASCULAR SURGERY) | Admitting: THORACIC SURGERY (CARDIOTHORACIC VASCULAR SURGERY)
Payer: COMMERCIAL

## 2023-10-02 ENCOUNTER — APPOINTMENT (OUTPATIENT)
Dept: GENERAL RADIOLOGY | Facility: CLINIC | Age: 63
End: 2023-10-02
Attending: STUDENT IN AN ORGANIZED HEALTH CARE EDUCATION/TRAINING PROGRAM
Payer: COMMERCIAL

## 2023-10-02 DIAGNOSIS — Z79.891 LONG TERM (CURRENT) USE OF OPIATE ANALGESIC: ICD-10-CM

## 2023-10-02 DIAGNOSIS — G93.40 ENCEPHALOPATHY: ICD-10-CM

## 2023-10-02 DIAGNOSIS — C34.12 MALIGNANT NEOPLASM OF UPPER LOBE OF LEFT LUNG (H): Primary | ICD-10-CM

## 2023-10-02 DIAGNOSIS — M79.10 MUSCLE PAIN: ICD-10-CM

## 2023-10-02 DIAGNOSIS — R53.83 TIRED: ICD-10-CM

## 2023-10-02 DIAGNOSIS — R44.3 HALLUCINATIONS: ICD-10-CM

## 2023-10-02 DIAGNOSIS — M54.2 CERVICALGIA: ICD-10-CM

## 2023-10-02 DIAGNOSIS — R20.9 SENSORY DISTURBANCE: ICD-10-CM

## 2023-10-02 PROBLEM — C34.90 LUNG CANCER (H): Status: ACTIVE | Noted: 2023-10-02

## 2023-10-02 LAB
ANION GAP SERPL CALCULATED.3IONS-SCNC: 11 MMOL/L (ref 7–15)
BASE EXCESS BLDA CALC-SCNC: -0.2 MMOL/L (ref -9.6–2)
BASE EXCESS BLDA CALC-SCNC: 1.4 MMOL/L (ref -9.6–2)
BLD PROD TYP BPU: NORMAL
BLD PROD TYP BPU: NORMAL
BLOOD COMPONENT TYPE: NORMAL
BLOOD COMPONENT TYPE: NORMAL
BUN SERPL-MCNC: 7.2 MG/DL (ref 8–23)
CA-I BLD-MCNC: 4.6 MG/DL (ref 4.4–5.2)
CA-I BLD-MCNC: 4.7 MG/DL (ref 4.4–5.2)
CALCIUM SERPL-MCNC: 8.3 MG/DL (ref 8.8–10.2)
CHLORIDE SERPL-SCNC: 99 MMOL/L (ref 98–107)
CODING SYSTEM: NORMAL
CODING SYSTEM: NORMAL
CREAT SERPL-MCNC: 0.88 MG/DL (ref 0.67–1.17)
CROSSMATCH: NORMAL
CROSSMATCH: NORMAL
DEPRECATED HCO3 PLAS-SCNC: 24 MMOL/L (ref 22–29)
EGFRCR SERPLBLD CKD-EPI 2021: >90 ML/MIN/1.73M2
ERYTHROCYTE [DISTWIDTH] IN BLOOD BY AUTOMATED COUNT: 11.8 % (ref 10–15)
GLUCOSE BLD-MCNC: 133 MG/DL (ref 70–99)
GLUCOSE BLD-MCNC: 146 MG/DL (ref 70–99)
GLUCOSE BLDC GLUCOMTR-MCNC: 96 MG/DL (ref 70–99)
GLUCOSE SERPL-MCNC: 159 MG/DL (ref 70–99)
HCO3 BLDA-SCNC: 25 MMOL/L (ref 21–28)
HCO3 BLDA-SCNC: 27 MMOL/L (ref 21–28)
HCT VFR BLD AUTO: 27.1 % (ref 40–53)
HGB BLD-MCNC: 11.3 G/DL (ref 13.3–17.7)
HGB BLD-MCNC: 11.5 G/DL (ref 13.3–17.7)
HGB BLD-MCNC: 9.3 G/DL (ref 13.3–17.7)
ISSUE DATE AND TIME: NORMAL
ISSUE DATE AND TIME: NORMAL
LACTATE BLD-SCNC: 1.3 MMOL/L
LACTATE BLD-SCNC: 1.7 MMOL/L
MAGNESIUM SERPL-MCNC: 1.7 MG/DL (ref 1.7–2.3)
MCH RBC QN AUTO: 30.2 PG (ref 26.5–33)
MCHC RBC AUTO-ENTMCNC: 34.3 G/DL (ref 31.5–36.5)
MCV RBC AUTO: 88 FL (ref 78–100)
O2/TOTAL GAS SETTING VFR VENT: 63 %
O2/TOTAL GAS SETTING VFR VENT: 71 %
PCO2 BLDA: 44 MM HG (ref 35–45)
PCO2 BLDA: 45 MM HG (ref 35–45)
PH BLDA: 7.36 [PH] (ref 7.35–7.45)
PH BLDA: 7.39 [PH] (ref 7.35–7.45)
PLATELET # BLD AUTO: 153 10E3/UL (ref 150–450)
PLATELET # BLD AUTO: 164 10E3/UL (ref 150–450)
PO2 BLDA: 336 MM HG (ref 80–105)
PO2 BLDA: 89 MM HG (ref 80–105)
POTASSIUM BLD-SCNC: 3.7 MMOL/L (ref 3.5–5)
POTASSIUM BLD-SCNC: 3.9 MMOL/L (ref 3.5–5)
POTASSIUM SERPL-SCNC: 4.3 MMOL/L (ref 3.4–5.3)
RBC # BLD AUTO: 3.08 10E6/UL (ref 4.4–5.9)
SODIUM BLD-SCNC: 132 MMOL/L (ref 135–145)
SODIUM BLD-SCNC: 133 MMOL/L (ref 135–145)
SODIUM SERPL-SCNC: 134 MMOL/L (ref 135–145)
UNIT ABO/RH: NORMAL
UNIT ABO/RH: NORMAL
UNIT NUMBER: NORMAL
UNIT NUMBER: NORMAL
UNIT STATUS: NORMAL
UNIT STATUS: NORMAL
UNIT TYPE ISBT: 5100
UNIT TYPE ISBT: 5100
WBC # BLD AUTO: 8.1 10E3/UL (ref 4–11)

## 2023-10-02 PROCEDURE — 83605 ASSAY OF LACTIC ACID: CPT

## 2023-10-02 PROCEDURE — 250N000009 HC RX 250: Performed by: STUDENT IN AN ORGANIZED HEALTH CARE EDUCATION/TRAINING PROGRAM

## 2023-10-02 PROCEDURE — 250N000013 HC RX MED GY IP 250 OP 250 PS 637: Performed by: NURSE PRACTITIONER

## 2023-10-02 PROCEDURE — 258N000003 HC RX IP 258 OP 636: Performed by: STUDENT IN AN ORGANIZED HEALTH CARE EDUCATION/TRAINING PROGRAM

## 2023-10-02 PROCEDURE — 83735 ASSAY OF MAGNESIUM: CPT | Performed by: STUDENT IN AN ORGANIZED HEALTH CARE EDUCATION/TRAINING PROGRAM

## 2023-10-02 PROCEDURE — C2618 PROBE/NEEDLE, CRYO: HCPCS | Performed by: THORACIC SURGERY (CARDIOTHORACIC VASCULAR SURGERY)

## 2023-10-02 PROCEDURE — 71045 X-RAY EXAM CHEST 1 VIEW: CPT | Mod: 26 | Performed by: RADIOLOGY

## 2023-10-02 PROCEDURE — 999N000141 HC STATISTIC PRE-PROCEDURE NURSING ASSESSMENT: Performed by: THORACIC SURGERY (CARDIOTHORACIC VASCULAR SURGERY)

## 2023-10-02 PROCEDURE — 250N000011 HC RX IP 250 OP 636: Mod: JZ | Performed by: THORACIC SURGERY (CARDIOTHORACIC VASCULAR SURGERY)

## 2023-10-02 PROCEDURE — P9045 ALBUMIN (HUMAN), 5%, 250 ML: HCPCS | Mod: JZ | Performed by: STUDENT IN AN ORGANIZED HEALTH CARE EDUCATION/TRAINING PROGRAM

## 2023-10-02 PROCEDURE — 07T70ZZ RESECTION OF THORAX LYMPHATIC, OPEN APPROACH: ICD-10-PCS | Performed by: THORACIC SURGERY (CARDIOTHORACIC VASCULAR SURGERY)

## 2023-10-02 PROCEDURE — 250N000011 HC RX IP 250 OP 636: Performed by: STUDENT IN AN ORGANIZED HEALTH CARE EDUCATION/TRAINING PROGRAM

## 2023-10-02 PROCEDURE — 38746 REMOVE THORACIC LYMPH NODES: CPT | Mod: GC | Performed by: THORACIC SURGERY (CARDIOTHORACIC VASCULAR SURGERY)

## 2023-10-02 PROCEDURE — 85049 AUTOMATED PLATELET COUNT: CPT | Performed by: STUDENT IN AN ORGANIZED HEALTH CARE EDUCATION/TRAINING PROGRAM

## 2023-10-02 PROCEDURE — 0BJL4ZZ INSPECTION OF LEFT LUNG, PERCUTANEOUS ENDOSCOPIC APPROACH: ICD-10-PCS | Performed by: THORACIC SURGERY (CARDIOTHORACIC VASCULAR SURGERY)

## 2023-10-02 PROCEDURE — 370N000017 HC ANESTHESIA TECHNICAL FEE, PER MIN: Performed by: THORACIC SURGERY (CARDIOTHORACIC VASCULAR SURGERY)

## 2023-10-02 PROCEDURE — 272N000001 HC OR GENERAL SUPPLY STERILE: Performed by: THORACIC SURGERY (CARDIOTHORACIC VASCULAR SURGERY)

## 2023-10-02 PROCEDURE — 250N000013 HC RX MED GY IP 250 OP 250 PS 637: Performed by: STUDENT IN AN ORGANIZED HEALTH CARE EDUCATION/TRAINING PROGRAM

## 2023-10-02 PROCEDURE — 250N000011 HC RX IP 250 OP 636: Performed by: THORACIC SURGERY (CARDIOTHORACIC VASCULAR SURGERY)

## 2023-10-02 PROCEDURE — 250N000011 HC RX IP 250 OP 636: Mod: JZ | Performed by: STUDENT IN AN ORGANIZED HEALTH CARE EDUCATION/TRAINING PROGRAM

## 2023-10-02 PROCEDURE — 32484 SEGMENTECTOMY: CPT | Mod: LT | Performed by: THORACIC SURGERY (CARDIOTHORACIC VASCULAR SURGERY)

## 2023-10-02 PROCEDURE — 02QR0ZZ REPAIR LEFT PULMONARY ARTERY, OPEN APPROACH: ICD-10-PCS | Performed by: THORACIC SURGERY (CARDIOTHORACIC VASCULAR SURGERY)

## 2023-10-02 PROCEDURE — 88307 TISSUE EXAM BY PATHOLOGIST: CPT | Mod: TC | Performed by: THORACIC SURGERY (CARDIOTHORACIC VASCULAR SURGERY)

## 2023-10-02 PROCEDURE — 250N000009 HC RX 250: Performed by: THORACIC SURGERY (CARDIOTHORACIC VASCULAR SURGERY)

## 2023-10-02 PROCEDURE — 360N000077 HC SURGERY LEVEL 4, PER MIN: Performed by: THORACIC SURGERY (CARDIOTHORACIC VASCULAR SURGERY)

## 2023-10-02 PROCEDURE — 250N000025 HC SEVOFLURANE, PER MIN: Performed by: THORACIC SURGERY (CARDIOTHORACIC VASCULAR SURGERY)

## 2023-10-02 PROCEDURE — 0BBG0ZZ EXCISION OF LEFT UPPER LUNG LOBE, OPEN APPROACH: ICD-10-PCS | Performed by: THORACIC SURGERY (CARDIOTHORACIC VASCULAR SURGERY)

## 2023-10-02 PROCEDURE — 36415 COLL VENOUS BLD VENIPUNCTURE: CPT | Performed by: STUDENT IN AN ORGANIZED HEALTH CARE EDUCATION/TRAINING PROGRAM

## 2023-10-02 PROCEDURE — 88307 TISSUE EXAM BY PATHOLOGIST: CPT | Mod: 26 | Performed by: STUDENT IN AN ORGANIZED HEALTH CARE EDUCATION/TRAINING PROGRAM

## 2023-10-02 PROCEDURE — 85048 AUTOMATED LEUKOCYTE COUNT: CPT | Performed by: STUDENT IN AN ORGANIZED HEALTH CARE EDUCATION/TRAINING PROGRAM

## 2023-10-02 PROCEDURE — 88309 TISSUE EXAM BY PATHOLOGIST: CPT | Mod: 26 | Performed by: STUDENT IN AN ORGANIZED HEALTH CARE EDUCATION/TRAINING PROGRAM

## 2023-10-02 PROCEDURE — 84295 ASSAY OF SERUM SODIUM: CPT | Performed by: STUDENT IN AN ORGANIZED HEALTH CARE EDUCATION/TRAINING PROGRAM

## 2023-10-02 PROCEDURE — 88305 TISSUE EXAM BY PATHOLOGIST: CPT | Mod: 26 | Performed by: STUDENT IN AN ORGANIZED HEALTH CARE EDUCATION/TRAINING PROGRAM

## 2023-10-02 PROCEDURE — 88331 PATH CONSLTJ SURG 1 BLK 1SPC: CPT | Mod: TC | Performed by: THORACIC SURGERY (CARDIOTHORACIC VASCULAR SURGERY)

## 2023-10-02 PROCEDURE — 120N000003 HC R&B IMCU UMMC

## 2023-10-02 PROCEDURE — 0BJ08ZZ INSPECTION OF TRACHEOBRONCHIAL TREE, VIA NATURAL OR ARTIFICIAL OPENING ENDOSCOPIC: ICD-10-PCS | Performed by: THORACIC SURGERY (CARDIOTHORACIC VASCULAR SURGERY)

## 2023-10-02 PROCEDURE — 82330 ASSAY OF CALCIUM: CPT

## 2023-10-02 PROCEDURE — 710N000009 HC RECOVERY PHASE 1, LEVEL 1, PER MIN: Performed by: THORACIC SURGERY (CARDIOTHORACIC VASCULAR SURGERY)

## 2023-10-02 PROCEDURE — 88331 PATH CONSLTJ SURG 1 BLK 1SPC: CPT | Mod: 26 | Performed by: STUDENT IN AN ORGANIZED HEALTH CARE EDUCATION/TRAINING PROGRAM

## 2023-10-02 PROCEDURE — 88360 TUMOR IMMUNOHISTOCHEM/MANUAL: CPT | Mod: 26 | Performed by: STUDENT IN AN ORGANIZED HEALTH CARE EDUCATION/TRAINING PROGRAM

## 2023-10-02 PROCEDURE — 999N000065 XR CHEST PORT 1 VIEW

## 2023-10-02 PROCEDURE — 99252 IP/OBS CONSLTJ NEW/EST SF 35: CPT | Mod: GC | Performed by: ANESTHESIOLOGY

## 2023-10-02 RX ORDER — CEFAZOLIN SODIUM/WATER 2 G/20 ML
2 SYRINGE (ML) INTRAVENOUS
Status: COMPLETED | OUTPATIENT
Start: 2023-10-02 | End: 2023-10-02

## 2023-10-02 RX ORDER — MORPHINE SULFATE 15 MG/1
30 TABLET, FILM COATED, EXTENDED RELEASE ORAL AT BEDTIME
Status: DISCONTINUED | OUTPATIENT
Start: 2023-10-02 | End: 2023-10-07 | Stop reason: HOSPADM

## 2023-10-02 RX ORDER — AMOXICILLIN 250 MG
1 CAPSULE ORAL 2 TIMES DAILY
Status: DISCONTINUED | OUTPATIENT
Start: 2023-10-02 | End: 2023-10-03

## 2023-10-02 RX ORDER — FENTANYL CITRATE 50 UG/ML
25 INJECTION, SOLUTION INTRAMUSCULAR; INTRAVENOUS EVERY 5 MIN PRN
Status: DISCONTINUED | OUTPATIENT
Start: 2023-10-02 | End: 2023-10-02 | Stop reason: HOSPADM

## 2023-10-02 RX ORDER — SODIUM CHLORIDE, SODIUM LACTATE, POTASSIUM CHLORIDE, CALCIUM CHLORIDE 600; 310; 30; 20 MG/100ML; MG/100ML; MG/100ML; MG/100ML
INJECTION, SOLUTION INTRAVENOUS CONTINUOUS
Status: DISCONTINUED | OUTPATIENT
Start: 2023-10-02 | End: 2023-10-02 | Stop reason: HOSPADM

## 2023-10-02 RX ORDER — FENTANYL CITRATE 50 UG/ML
INJECTION, SOLUTION INTRAMUSCULAR; INTRAVENOUS PRN
Status: DISCONTINUED | OUTPATIENT
Start: 2023-10-02 | End: 2023-10-02

## 2023-10-02 RX ORDER — VASOPRESSIN IN 0.9 % NACL 2 UNIT/2ML
SYRINGE (ML) INTRAVENOUS PRN
Status: DISCONTINUED | OUTPATIENT
Start: 2023-10-02 | End: 2023-10-02

## 2023-10-02 RX ORDER — RISPERIDONE 1 MG/1
1 TABLET ORAL EVERY EVENING
Status: DISCONTINUED | OUTPATIENT
Start: 2023-10-02 | End: 2023-10-07 | Stop reason: HOSPADM

## 2023-10-02 RX ORDER — POLYETHYLENE GLYCOL 3350 17 G/17G
17 POWDER, FOR SOLUTION ORAL DAILY
Status: DISCONTINUED | OUTPATIENT
Start: 2023-10-02 | End: 2023-10-02

## 2023-10-02 RX ORDER — NALOXONE HYDROCHLORIDE 0.4 MG/ML
0.2 INJECTION, SOLUTION INTRAMUSCULAR; INTRAVENOUS; SUBCUTANEOUS
Status: DISCONTINUED | OUTPATIENT
Start: 2023-10-02 | End: 2023-10-07 | Stop reason: HOSPADM

## 2023-10-02 RX ORDER — METHOCARBAMOL 750 MG/1
750 TABLET, FILM COATED ORAL EVERY 6 HOURS PRN
Status: DISCONTINUED | OUTPATIENT
Start: 2023-10-02 | End: 2023-10-07 | Stop reason: HOSPADM

## 2023-10-02 RX ORDER — CEFAZOLIN SODIUM/WATER 2 G/20 ML
2 SYRINGE (ML) INTRAVENOUS SEE ADMIN INSTRUCTIONS
Status: DISCONTINUED | OUTPATIENT
Start: 2023-10-02 | End: 2023-10-02 | Stop reason: HOSPADM

## 2023-10-02 RX ORDER — ONDANSETRON 4 MG/1
4 TABLET, ORALLY DISINTEGRATING ORAL EVERY 30 MIN PRN
Status: DISCONTINUED | OUTPATIENT
Start: 2023-10-02 | End: 2023-10-02 | Stop reason: HOSPADM

## 2023-10-02 RX ORDER — ONDANSETRON 2 MG/ML
4 INJECTION INTRAMUSCULAR; INTRAVENOUS EVERY 6 HOURS PRN
Status: DISCONTINUED | OUTPATIENT
Start: 2023-10-02 | End: 2023-10-07 | Stop reason: HOSPADM

## 2023-10-02 RX ORDER — SODIUM CHLORIDE, SODIUM LACTATE, POTASSIUM CHLORIDE, CALCIUM CHLORIDE 600; 310; 30; 20 MG/100ML; MG/100ML; MG/100ML; MG/100ML
INJECTION, SOLUTION INTRAVENOUS CONTINUOUS PRN
Status: DISCONTINUED | OUTPATIENT
Start: 2023-10-02 | End: 2023-10-02

## 2023-10-02 RX ORDER — DOCUSATE SODIUM 100 MG/1
200 CAPSULE, LIQUID FILLED ORAL AT BEDTIME
Status: DISCONTINUED | OUTPATIENT
Start: 2023-10-02 | End: 2023-10-02

## 2023-10-02 RX ORDER — LIDOCAINE HYDROCHLORIDE 20 MG/ML
INJECTION, SOLUTION INFILTRATION; PERINEURAL PRN
Status: DISCONTINUED | OUTPATIENT
Start: 2023-10-02 | End: 2023-10-02

## 2023-10-02 RX ORDER — ONDANSETRON 2 MG/ML
4 INJECTION INTRAMUSCULAR; INTRAVENOUS EVERY 30 MIN PRN
Status: DISCONTINUED | OUTPATIENT
Start: 2023-10-02 | End: 2023-10-02 | Stop reason: HOSPADM

## 2023-10-02 RX ORDER — FENTANYL CITRATE 50 UG/ML
50 INJECTION, SOLUTION INTRAMUSCULAR; INTRAVENOUS EVERY 5 MIN PRN
Status: DISCONTINUED | OUTPATIENT
Start: 2023-10-02 | End: 2023-10-02 | Stop reason: HOSPADM

## 2023-10-02 RX ORDER — AMOXICILLIN 250 MG
1 CAPSULE ORAL 2 TIMES DAILY
Status: DISCONTINUED | OUTPATIENT
Start: 2023-10-02 | End: 2023-10-02

## 2023-10-02 RX ORDER — ENOXAPARIN SODIUM 100 MG/ML
40 INJECTION SUBCUTANEOUS
Status: COMPLETED | OUTPATIENT
Start: 2023-10-02 | End: 2023-10-02

## 2023-10-02 RX ORDER — DEXAMETHASONE SODIUM PHOSPHATE 4 MG/ML
INJECTION, SOLUTION INTRA-ARTICULAR; INTRALESIONAL; INTRAMUSCULAR; INTRAVENOUS; SOFT TISSUE PRN
Status: DISCONTINUED | OUTPATIENT
Start: 2023-10-02 | End: 2023-10-02

## 2023-10-02 RX ORDER — EPHEDRINE SULFATE 50 MG/ML
INJECTION, SOLUTION INTRAMUSCULAR; INTRAVENOUS; SUBCUTANEOUS PRN
Status: DISCONTINUED | OUTPATIENT
Start: 2023-10-02 | End: 2023-10-02

## 2023-10-02 RX ORDER — BISACODYL 10 MG
10 SUPPOSITORY, RECTAL RECTAL DAILY PRN
Status: DISCONTINUED | OUTPATIENT
Start: 2023-10-02 | End: 2023-10-07 | Stop reason: HOSPADM

## 2023-10-02 RX ORDER — PAROXETINE 10 MG/1
10 TABLET, FILM COATED ORAL DAILY
Status: DISCONTINUED | OUTPATIENT
Start: 2023-10-02 | End: 2023-10-02

## 2023-10-02 RX ORDER — ACETAMINOPHEN 325 MG/1
975 TABLET ORAL ONCE
Status: COMPLETED | OUTPATIENT
Start: 2023-10-02 | End: 2023-10-02

## 2023-10-02 RX ORDER — AMOXICILLIN 250 MG
2 CAPSULE ORAL 2 TIMES DAILY
Status: DISCONTINUED | OUTPATIENT
Start: 2023-10-02 | End: 2023-10-07 | Stop reason: HOSPADM

## 2023-10-02 RX ORDER — CHLORHEXIDINE GLUCONATE ORAL RINSE 1.2 MG/ML
15 SOLUTION DENTAL ONCE
Status: COMPLETED | OUTPATIENT
Start: 2023-10-02 | End: 2023-10-02

## 2023-10-02 RX ORDER — ONDANSETRON 4 MG/1
4 TABLET, ORALLY DISINTEGRATING ORAL EVERY 6 HOURS PRN
Status: DISCONTINUED | OUTPATIENT
Start: 2023-10-02 | End: 2023-10-07 | Stop reason: HOSPADM

## 2023-10-02 RX ORDER — PAROXETINE 40 MG/1
40 TABLET, FILM COATED ORAL DAILY
Status: DISCONTINUED | OUTPATIENT
Start: 2023-10-03 | End: 2023-10-07 | Stop reason: HOSPADM

## 2023-10-02 RX ORDER — CLONAZEPAM 1 MG/1
1 TABLET ORAL 2 TIMES DAILY
Status: DISCONTINUED | OUTPATIENT
Start: 2023-10-02 | End: 2023-10-07 | Stop reason: HOSPADM

## 2023-10-02 RX ORDER — FLUTICASONE FUROATE AND VILANTEROL 100; 25 UG/1; UG/1
1 POWDER RESPIRATORY (INHALATION) DAILY
Status: DISCONTINUED | OUTPATIENT
Start: 2023-10-03 | End: 2023-10-07 | Stop reason: HOSPADM

## 2023-10-02 RX ORDER — POLYETHYLENE GLYCOL 3350 17 G/17G
17 POWDER, FOR SOLUTION ORAL DAILY
Status: DISCONTINUED | OUTPATIENT
Start: 2023-10-03 | End: 2023-10-02

## 2023-10-02 RX ORDER — HYDROMORPHONE HCL IN WATER/PF 6 MG/30 ML
0.4 PATIENT CONTROLLED ANALGESIA SYRINGE INTRAVENOUS EVERY 5 MIN PRN
Status: DISCONTINUED | OUTPATIENT
Start: 2023-10-02 | End: 2023-10-02 | Stop reason: HOSPADM

## 2023-10-02 RX ORDER — PHENYLEPHRINE HCL IN 0.9% NACL 50MG/250ML
.1-6 PLASTIC BAG, INJECTION (ML) INTRAVENOUS CONTINUOUS
Status: DISCONTINUED | OUTPATIENT
Start: 2023-10-02 | End: 2023-10-02 | Stop reason: HOSPADM

## 2023-10-02 RX ORDER — SODIUM CHLORIDE, SODIUM LACTATE, POTASSIUM CHLORIDE, CALCIUM CHLORIDE 600; 310; 30; 20 MG/100ML; MG/100ML; MG/100ML; MG/100ML
INJECTION, SOLUTION INTRAVENOUS CONTINUOUS
Status: DISCONTINUED | OUTPATIENT
Start: 2023-10-02 | End: 2023-10-03

## 2023-10-02 RX ORDER — CELECOXIB 200 MG/1
200 CAPSULE ORAL ONCE
Status: COMPLETED | OUTPATIENT
Start: 2023-10-02 | End: 2023-10-02

## 2023-10-02 RX ORDER — BUPIVACAINE HYDROCHLORIDE AND EPINEPHRINE 2.5; 5 MG/ML; UG/ML
INJECTION, SOLUTION INFILTRATION; PERINEURAL PRN
Status: DISCONTINUED | OUTPATIENT
Start: 2023-10-02 | End: 2023-10-02 | Stop reason: HOSPADM

## 2023-10-02 RX ORDER — RISPERIDONE 2 MG/1
2 TABLET ORAL EVERY MORNING
Status: DISCONTINUED | OUTPATIENT
Start: 2023-10-03 | End: 2023-10-07 | Stop reason: HOSPADM

## 2023-10-02 RX ORDER — POLYETHYLENE GLYCOL 3350 17 G/17G
17 POWDER, FOR SOLUTION ORAL DAILY
Status: DISCONTINUED | OUTPATIENT
Start: 2023-10-02 | End: 2023-10-07 | Stop reason: HOSPADM

## 2023-10-02 RX ORDER — ALBUTEROL SULFATE 90 UG/1
4 AEROSOL, METERED RESPIRATORY (INHALATION) 4 TIMES DAILY
Status: DISCONTINUED | OUTPATIENT
Start: 2023-10-02 | End: 2023-10-07 | Stop reason: HOSPADM

## 2023-10-02 RX ORDER — LACTULOSE 10 G/15ML
20 SOLUTION ORAL DAILY PRN
Status: DISCONTINUED | OUTPATIENT
Start: 2023-10-02 | End: 2023-10-07 | Stop reason: HOSPADM

## 2023-10-02 RX ORDER — HEPARIN SODIUM 5000 [USP'U]/.5ML
5000 INJECTION, SOLUTION INTRAVENOUS; SUBCUTANEOUS EVERY 8 HOURS
Status: CANCELLED | OUTPATIENT
Start: 2023-10-03

## 2023-10-02 RX ORDER — PAROXETINE 30 MG/1
30 TABLET, FILM COATED ORAL DAILY
Status: DISCONTINUED | OUTPATIENT
Start: 2023-10-02 | End: 2023-10-02

## 2023-10-02 RX ORDER — NALOXONE HYDROCHLORIDE 0.4 MG/ML
0.4 INJECTION, SOLUTION INTRAMUSCULAR; INTRAVENOUS; SUBCUTANEOUS
Status: DISCONTINUED | OUTPATIENT
Start: 2023-10-02 | End: 2023-10-07 | Stop reason: HOSPADM

## 2023-10-02 RX ORDER — SENNOSIDES 8.6 MG
5 TABLET ORAL AT BEDTIME
Status: DISCONTINUED | OUTPATIENT
Start: 2023-10-02 | End: 2023-10-02

## 2023-10-02 RX ORDER — GLYCOPYRROLATE 0.2 MG/ML
INJECTION, SOLUTION INTRAMUSCULAR; INTRAVENOUS PRN
Status: DISCONTINUED | OUTPATIENT
Start: 2023-10-02 | End: 2023-10-02

## 2023-10-02 RX ORDER — PROPOFOL 10 MG/ML
INJECTION, EMULSION INTRAVENOUS PRN
Status: DISCONTINUED | OUTPATIENT
Start: 2023-10-02 | End: 2023-10-02

## 2023-10-02 RX ORDER — LIDOCAINE 40 MG/G
CREAM TOPICAL
Status: DISCONTINUED | OUTPATIENT
Start: 2023-10-02 | End: 2023-10-02 | Stop reason: HOSPADM

## 2023-10-02 RX ORDER — ACETAMINOPHEN 325 MG/1
650 TABLET ORAL EVERY 4 HOURS PRN
Status: DISCONTINUED | OUTPATIENT
Start: 2023-10-05 | End: 2023-10-06

## 2023-10-02 RX ORDER — ACETAMINOPHEN 325 MG/1
975 TABLET ORAL EVERY 8 HOURS
Status: COMPLETED | OUTPATIENT
Start: 2023-10-02 | End: 2023-10-05

## 2023-10-02 RX ORDER — HYDROMORPHONE HCL IN WATER/PF 6 MG/30 ML
0.2 PATIENT CONTROLLED ANALGESIA SYRINGE INTRAVENOUS EVERY 5 MIN PRN
Status: DISCONTINUED | OUTPATIENT
Start: 2023-10-02 | End: 2023-10-02 | Stop reason: HOSPADM

## 2023-10-02 RX ORDER — PROCHLORPERAZINE MALEATE 5 MG
10 TABLET ORAL EVERY 6 HOURS PRN
Status: DISCONTINUED | OUTPATIENT
Start: 2023-10-02 | End: 2023-10-07 | Stop reason: HOSPADM

## 2023-10-02 RX ORDER — AMOXICILLIN 250 MG
2 CAPSULE ORAL 2 TIMES DAILY
Status: DISCONTINUED | OUTPATIENT
Start: 2023-10-02 | End: 2023-10-02

## 2023-10-02 RX ADMIN — EPINEPHRINE 10 MCG: 1 INJECTION INTRAMUSCULAR; INTRAVENOUS; SUBCUTANEOUS at 07:37

## 2023-10-02 RX ADMIN — PHENYLEPHRINE HYDROCHLORIDE 100 MCG: 10 INJECTION INTRAVENOUS at 11:35

## 2023-10-02 RX ADMIN — SODIUM CHLORIDE, SODIUM LACTATE, POTASSIUM CHLORIDE, CALCIUM CHLORIDE: 600; 310; 30; 20 INJECTION, SOLUTION INTRAVENOUS at 12:27

## 2023-10-02 RX ADMIN — ENOXAPARIN SODIUM 40 MG: 40 INJECTION SUBCUTANEOUS at 06:48

## 2023-10-02 RX ADMIN — MIDAZOLAM 1 MG: 1 INJECTION INTRAMUSCULAR; INTRAVENOUS at 07:25

## 2023-10-02 RX ADMIN — PROPOFOL 150 MG: 10 INJECTION, EMULSION INTRAVENOUS at 07:35

## 2023-10-02 RX ADMIN — SODIUM CHLORIDE, POTASSIUM CHLORIDE, SODIUM LACTATE AND CALCIUM CHLORIDE: 600; 310; 30; 20 INJECTION, SOLUTION INTRAVENOUS at 07:26

## 2023-10-02 RX ADMIN — SODIUM CHLORIDE 0.5 MG/MIN: 9 INJECTION, SOLUTION INTRAVENOUS at 20:40

## 2023-10-02 RX ADMIN — FENTANYL CITRATE 50 MCG: 50 INJECTION, SOLUTION INTRAMUSCULAR; INTRAVENOUS at 10:56

## 2023-10-02 RX ADMIN — EPHEDRINE SULFATE 15 MG: 5 INJECTION INTRAVENOUS at 07:36

## 2023-10-02 RX ADMIN — OXYCODONE HYDROCHLORIDE 15 MG: 5 TABLET ORAL at 15:25

## 2023-10-02 RX ADMIN — Medication 0.5 UNITS: at 09:18

## 2023-10-02 RX ADMIN — SENNOSIDES AND DOCUSATE SODIUM 2 TABLET: 50; 8.6 TABLET ORAL at 19:42

## 2023-10-02 RX ADMIN — Medication 2 G: at 08:06

## 2023-10-02 RX ADMIN — DEXMEDETOMIDINE HYDROCHLORIDE 4 MCG: 100 INJECTION, SOLUTION INTRAVENOUS at 13:09

## 2023-10-02 RX ADMIN — FENTANYL CITRATE 50 MCG: 50 INJECTION, SOLUTION INTRAMUSCULAR; INTRAVENOUS at 08:35

## 2023-10-02 RX ADMIN — GLYCOPYRROLATE 0.2 MG: 0.2 INJECTION, SOLUTION INTRAMUSCULAR; INTRAVENOUS at 11:17

## 2023-10-02 RX ADMIN — PHENYLEPHRINE HYDROCHLORIDE 50 MCG: 10 INJECTION INTRAVENOUS at 11:23

## 2023-10-02 RX ADMIN — SODIUM CHLORIDE 1 MG/MIN: 0.9 INJECTION, SOLUTION INTRAVENOUS at 13:45

## 2023-10-02 RX ADMIN — SUGAMMADEX 200 MG: 100 INJECTION, SOLUTION INTRAVENOUS at 13:30

## 2023-10-02 RX ADMIN — AMIODARONE HYDROCHLORIDE 150 MG: 1.5 INJECTION, SOLUTION INTRAVENOUS at 12:43

## 2023-10-02 RX ADMIN — GLYCOPYRROLATE 0.2 MG: 0.2 INJECTION, SOLUTION INTRAMUSCULAR; INTRAVENOUS at 09:18

## 2023-10-02 RX ADMIN — Medication 10 MG: at 09:08

## 2023-10-02 RX ADMIN — ALBUMIN HUMAN 25 G: 0.05 INJECTION, SOLUTION INTRAVENOUS at 14:01

## 2023-10-02 RX ADMIN — SODIUM CHLORIDE 10 MG/HR: 9 INJECTION, SOLUTION INTRAVENOUS at 08:20

## 2023-10-02 RX ADMIN — EPHEDRINE SULFATE 5 MG: 5 INJECTION INTRAVENOUS at 09:02

## 2023-10-02 RX ADMIN — HYDROMORPHONE HYDROCHLORIDE 0.4 MG: 0.2 INJECTION, SOLUTION INTRAMUSCULAR; INTRAVENOUS; SUBCUTANEOUS at 14:44

## 2023-10-02 RX ADMIN — OXYCODONE HYDROCHLORIDE 15 MG: 5 TABLET ORAL at 18:30

## 2023-10-02 RX ADMIN — Medication: at 15:07

## 2023-10-02 RX ADMIN — CHLORHEXIDINE GLUCONATE 15 ML: 1.2 SOLUTION ORAL at 06:48

## 2023-10-02 RX ADMIN — FENTANYL CITRATE 50 MCG: 50 INJECTION, SOLUTION INTRAMUSCULAR; INTRAVENOUS at 13:08

## 2023-10-02 RX ADMIN — ACETAMINOPHEN 975 MG: 325 TABLET, FILM COATED ORAL at 18:29

## 2023-10-02 RX ADMIN — DEXMEDETOMIDINE HYDROCHLORIDE 4 MCG: 100 INJECTION, SOLUTION INTRAVENOUS at 13:06

## 2023-10-02 RX ADMIN — CLONAZEPAM 1 MG: 1 TABLET ORAL at 19:43

## 2023-10-02 RX ADMIN — DEXAMETHASONE SODIUM PHOSPHATE 4 MG: 4 INJECTION, SOLUTION INTRA-ARTICULAR; INTRALESIONAL; INTRAMUSCULAR; INTRAVENOUS; SOFT TISSUE at 07:51

## 2023-10-02 RX ADMIN — PHENYLEPHRINE HYDROCHLORIDE 100 MCG: 10 INJECTION INTRAVENOUS at 11:56

## 2023-10-02 RX ADMIN — EPHEDRINE SULFATE 10 MG: 5 INJECTION INTRAVENOUS at 09:29

## 2023-10-02 RX ADMIN — HYDROMORPHONE HYDROCHLORIDE 0.5 MG: 1 INJECTION, SOLUTION INTRAMUSCULAR; INTRAVENOUS; SUBCUTANEOUS at 08:35

## 2023-10-02 RX ADMIN — FENTANYL CITRATE 50 MCG: 50 INJECTION, SOLUTION INTRAMUSCULAR; INTRAVENOUS at 14:30

## 2023-10-02 RX ADMIN — ALBUMIN (HUMAN): 12.5 SOLUTION INTRAVENOUS at 11:37

## 2023-10-02 RX ADMIN — GLYCOPYRROLATE 0.4 MG: 0.2 INJECTION, SOLUTION INTRAMUSCULAR; INTRAVENOUS at 07:34

## 2023-10-02 RX ADMIN — FENTANYL CITRATE 50 MCG: 50 INJECTION, SOLUTION INTRAMUSCULAR; INTRAVENOUS at 14:15

## 2023-10-02 RX ADMIN — Medication 20 MG: at 10:48

## 2023-10-02 RX ADMIN — EPHEDRINE SULFATE 5 MG: 5 INJECTION INTRAVENOUS at 11:17

## 2023-10-02 RX ADMIN — SODIUM CHLORIDE, SODIUM LACTATE, POTASSIUM CHLORIDE, CALCIUM CHLORIDE: 600; 310; 30; 20 INJECTION, SOLUTION INTRAVENOUS at 07:28

## 2023-10-02 RX ADMIN — CELECOXIB 200 MG: 200 CAPSULE ORAL at 06:48

## 2023-10-02 RX ADMIN — Medication 2 G: at 12:04

## 2023-10-02 RX ADMIN — RISPERIDONE 1 MG: 1 TABLET ORAL at 19:43

## 2023-10-02 RX ADMIN — FENTANYL CITRATE 50 MCG: 50 INJECTION, SOLUTION INTRAMUSCULAR; INTRAVENOUS at 08:40

## 2023-10-02 RX ADMIN — EPHEDRINE SULFATE 10 MG: 5 INJECTION INTRAVENOUS at 11:02

## 2023-10-02 RX ADMIN — LIDOCAINE HYDROCHLORIDE 100 MG: 20 INJECTION, SOLUTION INFILTRATION; PERINEURAL at 07:35

## 2023-10-02 RX ADMIN — Medication 100 MG: at 07:35

## 2023-10-02 RX ADMIN — DEXMEDETOMIDINE HYDROCHLORIDE 4 MCG: 100 INJECTION, SOLUTION INTRAVENOUS at 13:12

## 2023-10-02 RX ADMIN — EPHEDRINE SULFATE 5 MG: 5 INJECTION INTRAVENOUS at 08:54

## 2023-10-02 RX ADMIN — Medication 10 MG: at 09:35

## 2023-10-02 RX ADMIN — FENTANYL CITRATE 100 MCG: 50 INJECTION, SOLUTION INTRAMUSCULAR; INTRAVENOUS at 08:17

## 2023-10-02 RX ADMIN — HYDROMORPHONE HYDROCHLORIDE 0.5 MG: 1 INJECTION, SOLUTION INTRAMUSCULAR; INTRAVENOUS; SUBCUTANEOUS at 10:27

## 2023-10-02 RX ADMIN — MORPHINE SULFATE 30 MG: 15 TABLET, EXTENDED RELEASE ORAL at 22:19

## 2023-10-02 RX ADMIN — Medication 10 MG: at 10:25

## 2023-10-02 RX ADMIN — PHENYLEPHRINE HYDROCHLORIDE 100 MCG: 10 INJECTION INTRAVENOUS at 11:41

## 2023-10-02 RX ADMIN — PHENYLEPHRINE HYDROCHLORIDE 0.4 MCG/KG/MIN: 10 INJECTION INTRAVENOUS at 11:53

## 2023-10-02 RX ADMIN — PHENYLEPHRINE HYDROCHLORIDE 50 MCG: 10 INJECTION INTRAVENOUS at 11:27

## 2023-10-02 RX ADMIN — ACETAMINOPHEN 975 MG: 325 TABLET, FILM COATED ORAL at 06:48

## 2023-10-02 RX ADMIN — OXYCODONE HYDROCHLORIDE 15 MG: 5 TABLET ORAL at 22:19

## 2023-10-02 ASSESSMENT — ACTIVITIES OF DAILY LIVING (ADL)
ADLS_ACUITY_SCORE: 20
ADLS_ACUITY_SCORE: 26
ADLS_ACUITY_SCORE: 20
ADLS_ACUITY_SCORE: 20
ADLS_ACUITY_SCORE: 26
ADLS_ACUITY_SCORE: 20
ADLS_ACUITY_SCORE: 20

## 2023-10-02 NOTE — ANESTHESIA PROCEDURE NOTES
Airway       Patient location during procedure: OR       Procedure Start/Stop Times: 10/2/2023 7:40 AM  Staff -        Anesthesiologist:  Ihsan Alex MD       Resident/Fellow: Jorge Denise MD       Performed By: resident  Consent for Airway        Urgency: elective  Indications and Patient Condition       Indications for airway management: loyda-procedural       Induction type:intravenous       Mask difficulty assessment: 1 - vent by mask    Final Airway Details       Final airway type: endotracheal airway       Successful airway: ETT - double lumen left  Endotracheal Airway Details        Cuffed: yes       Successful intubation technique: video laryngoscopy       VL Blade Size: MAC 4       Grade View of Cords: 1       Adjucts: stylet       Position: Center       Measured from: lips       Bite block used: None       ETT Double lumen (fr): 41    Post intubation assessment        Placement verified by: capnometry, equal breath sounds and chest rise        Number of attempts at approach: 2       Number of other approaches attempted: 0       Secured with: pink tape and silk tape       Ease of procedure: easy       Dentition: Intact and Unchanged    Medication(s) Administered   Medication Administration Time: 10/2/2023 7:40 AM

## 2023-10-02 NOTE — CONSULTS
"Pain Service Consultation Note  [facility]      Patient Name: Alex Frost  MRN: [id number]   Age: 62 year old  Sex: male  Date: October 2, 2023                                      Reviewed: Yes    Referring Provider:  Karlee Tena MD   Referring Service:  Thoracic Surgery  Reason for Consultation: \"Pain management - thoracotomy in patient with chronic pain\"    Assessment/Recommendations:  62 year old male PMHx of sleep apnea, chronic low back pain (on PTA narcotics), GERD, left lung nodule s/p left thoracoscopic upper lobe wedge resection, segmentectomy, left thoracotomy on 10/2. Pain team consulted for pain management - thoracotomy in patient with chronic pain. Upon initial meeting with patient in the PACU, pain seems to be well controlled, team will proceed as outlined below.     Plan:   -Please hold blood thinners 10/3 in case patient decides he would like to receive a catheter vs. epidural for pain, at time time of evaluation on 10/2 patient appeared to have his pain adequately controlled and also had received Lovenox this AM so unable to place invasive catheter today. Team will re-assess this tomorrow at morning rounds.     -Otherwise would recommend following recommendations as outlined below from the pre-operative pharmacy visit on 9/22/23:  \"PREOPERATIVE PAIN CONSULT FOR POSTOPERATIVE PAIN MANAGEMENT  Alex Frost was interviewed via phone on September 22, 2023 prior to PAC Clinic appointment. Patient is preparing for the planned procedure with Dr. Gotti on 10/2/23 at the [facility] for LEFT THORACOSCOPIC UPPER LOBE WEDGE RESECTION POSSIBLE SEGMENTECTOMY.  These recommendations are intended for patients admitted to the hospital after a procedure and are only valid for 30 days from the date of service. If there are significant changes in opioid dosing between today and day of procedure the below recommendations may have to be adjusted.     "   OUTPATIENT PRIOR TO ADMISSION MEDICATIONS (related to pain management):  -- Long-acting opioid: Morphine extended release (MS Contin) 15 mg tablets-take 30 mg (2 tablets) at bedtime  -- Short-acting opioid: Oxycodone 15 mg tablets take 1 tablet 5 times a day while awake (scheduled)  -- Oral adjuvant(s): Ibuprofen 600 mg every 8 hours as needed for pain  -- Topicals: None  -- Bowel regimen: Senna-5 tablets at bedtime, docusate 200 mg at bedtime, Movantik 25 mg every morning  -- Other relevant medications: Nasal Sonam 2 mg in the morning and 1 mg in the afternoon     Verbal consent was given by patient to access pharmacy records and [place] Prescription Monitoring Profile: Yes  Outpatient opioids prescribed by Kjersten Duire, PA ([facility]).   Outpatient opioid oral morphine equivalent (OME): 142 mg OME/day      RECOMMENDATIONS:   The following pain management recommendations are made based on information from today's visit and should not replace medical decision-making based on patient condition at the time of procedure or postoperatively.       - PREOPERATIVE:  + Before procedure recommend acetaminophen 975 mg PO in pre-op (Written in pre-op by PAC MARIA ALEJANDRA)     - INTRAOPERATIVE (Anesthesiologist/CRNA to consider):   + Regional anesthesia - Defer to RAPS team   + Ketamine IV intraoperatively   + Avoid remifentanil - to reduce risk of developing hyperalgesia     - POSTOPERATIVE INPATIENT MANAGEMENT:  Opioid analgesic:  + If able to take oral medications (preferred):           -- Continue home dose of morphine ER (MS Contin) 30 mg every evening at bedtime (hold or reduce dose as needed if patient has s/sx sedation or respiratory depression)          -- Start oxycodone 15 to 20 mg every 3 hours as needed for moderate to severe pain          -- Start hydromorphone IV 0.3-0.5 mg IV every 2 hr PRN breakthrough pain not controlled by PO medication or prior to significant activity (eg. PT/OT sessions).        +  If unable to take oral medications (or primary team prefers PCA initially postop):           -- Continue home dose of morphine ER (MS Contin) 30 mg every evening at bedtime (hold or reduce dose as needed if patient has s/sx sedation or respiratory depression)         --HYDROmorphone (Dilaudid) PCA recommended dose range 0.2-0.4 mg Q 10 min lockout interval with NO continuous rate. Start with 0.3 mg PCA dose Q 10 min lockout interval. If necessary for pain control and improvement in physical function, notify provider for PCA dose increase orders per recommended dose range. Hold or reduce dose for sedation.            + Note: if neuraxial opioid or other long acting opioid (eg. Methadone) is given contact on-call pain provider for adjustment in opioid plan     Nonopioid analgesics:  + Regional anesthesia per RAPS team.   + Start acetaminophen 975 mg PO every 6 hr scheduled if no concern about masking fevers  + Avoid gabapentin and Lyrica given previous reaction   + When okay per surgery initiate scheduled oral NSAID  + Heat/ice PRN  + Initiate lidocaine 4% patch - 1-3 patches every 24 hours scheduled (12 hr on 12 hr off). Apply to intact skin only.  Do not start if long acting nerve block given.      Muscle Relaxant:   + Start methocarbamol 500 mg every 6 hours as needed for muscle spasms (hold for sedation)     Stool softeners/Laxatives:   + When appropriate start senna-docusate 2 tabs PO BID and Miralax 17 g daily to prevent opioid induced constipation.   + Continue pts home movantik 25 mg daily in the morning.      Other:  + Please consult the inpatient pain management service for postoperative pain management recommendations.      + Ketamine IV infusion.  If needed for acute postop uncontrolled pain, the primary service may decide to start ketamine infusion at 5 mg/hr and increase to 7.5 mg/hr if tolerating.  Ketamine for acute pain management will be used as an analgesic medication in addition to opioids when  "usual analgesics are suboptimal.Only start ketamine IV if patient is stable from a cardiovascular standpoint.  Low dose ketamine may be administered on a regular nursing unit according to [facility] ketamine-low dose policy.  Please see policy for details on contraindications, adverse effects and monitoring.  Of note, sialorrhea is another potential side effect and must weight benefit/risk if patient having trouble protecting airway.  [email or web address]      + Recommend close monitoring of respiratory status postoperatively with capnography and continuous SpO2 monitoring. Would recommend continuing capnography beyond the usual 24 hr due to patient's opioid tolerance. This is particularly important given patient's untreated (mild) SUDARSHAN.\"    Thank you for the opportunity to participate in the care of Alex [facility] [place]  Pain Service will continue to follow.    Discussed with attending anesthesiologist  Primary Service Contacted with Recommendations? Yes    Zena Adan MD  10/2/2023      Chief Complaint:  Incisional pain s/p thoracotomy.   Chronic low back pain    History of Present Illness:  62 year old male PMHx of sleep apnea, chronic low back pain (on PTA narcotics), GERD, left lung nodule s/p left thoracoscopic upper lobe wedge resection, segmentectomy, left thoracotomy on 10/2.    Prior to admission for procedure, patient was taking:  -- Long-acting opioid: Morphine extended release (MS Contin) 15 mg tablets-take 30 mg (2 tablets) at bedtime  -- Short-acting opioid: Oxycodone 15 mg tablets take 1 tablet 5 times a day while awake (scheduled)  -- Oral adjuvant(s): Ibuprofen 600 mg every 8 hours as needed for pain  -- Topicals: None  -- Bowel regimen: Senna-5 tablets at bedtime, docusate 200 mg at bedtime, Movantik 25 mg every morning  -- Other relevant medications: Nasal Sonam 2 mg in the morning and 1 mg in the afternoon    Pain team was consulted " while patient was still in the OR, waited until patient was in the PACU to interview, however he was still very sleepy an groggy from his surgery. Patient stated he was having some pain on the left chest, but is overall comfortable. States he is on chronic opioids at home for low back pain.     Past Medical History:  Past Medical History:   Diagnosis Date    Anxiety     Chronic back pain     Chronic kidney disease     Cigar smoker     COPD (chronic obstructive pulmonary disease) (H)     Depression     GERD (gastroesophageal reflux disease)     Hyperlipidemia     Hypertension     Obesity     Sleep apnea        Family History:    Family History   Problem Relation Age of Onset    Emphysema Mother     Lung Cancer Father     Anesthesia Reaction No family hx of     Thrombosis No family hx of        Social History:  Social History     Tobacco Use    Smoking status: Former     Packs/day: 0.25     Years: 0.50     Pack years: 0.13     Types: Cigars, Cigarettes     Quit date: 2022     Years since quittin.9    Smokeless tobacco: Never   Substance Use Topics    Alcohol use: Not Currently           Review of Systems:  Complete ROS reviewed. Unless otherwise noted, all other systems found to be negative.        Laboratory Results:  Recent Labs   Lab Test 23  1412 01/10/18  1253 18  1355   INR  --   --  1.02      < > 228   PTT  --   --  28   BUN 12.2   < > 10    < > = values in this interval not displayed.       Allergies:  Allergies   Allergen Reactions    Amitriptyline      Reaction: Racing heart (tachycardia)     Lisinopril      Reaction: Facial Parathesia (numbness)    Lyrica [Pregabalin]      Reaction: Facial Parathesia (numbness)     Moxifloxacin Hcl In Nacl      Reaction: Anxiety    Neurontin [Gabapentin]      Reaction: Rash and itching         Current Pain Related Medications:  Medications related to Pain Management (From now, onward)      Start     Dose/Rate Route Frequency Ordered Stop     10/02/23 2200  docusate sodium (COLACE) capsule 200 mg        Note to Pharmacy: PTA Sig:Take 200 mg by mouth At Bedtime      200 mg Oral AT BEDTIME 10/02/23 1402      10/02/23 2200  morphine (MS CONTIN) 12 hr tablet 30 mg        Note to Pharmacy: PTA Sig:Take 30 mg by mouth At Bedtime      30 mg Oral AT BEDTIME 10/02/23 1402      10/02/23 2200  sennosides (SENOKOT) tablet 5 tablet        Note to Pharmacy: PTA Sig:Take 5 tablets by mouth At Bedtime      5 tablet Oral AT BEDTIME 10/02/23 1402      10/02/23 2000  senna-docusate (SENOKOT-S/PERICOLACE) 8.6-50 MG per tablet 1 tablet        See Hyperspace for full Linked Orders Report.    1 tablet Oral 2 TIMES DAILY 10/02/23 1355      10/02/23 2000  senna-docusate (SENOKOT-S/PERICOLACE) 8.6-50 MG per tablet 2 tablet        See Hyperspace for full Linked Orders Report.    2 tablet Oral 2 TIMES DAILY 10/02/23 1355      10/02/23 2000  clonazePAM (klonoPIN) tablet 1 mg        Note to Pharmacy: PTA Sig:Take 2 mg (2 tablets) in the morning and take 1 mg in the afternoon.      1 mg Oral 2 TIMES DAILY 10/02/23 1402      10/02/23 2000  senna-docusate (SENOKOT-S/PERICOLACE) 8.6-50 MG per tablet 1 tablet        See Hyperspace for full Linked Orders Report.    1 tablet Oral 2 TIMES DAILY 10/02/23 1404      10/02/23 2000  senna-docusate (SENOKOT-S/PERICOLACE) 8.6-50 MG per tablet 2 tablet        See Hyperspace for full Linked Orders Report.    2 tablet Oral 2 TIMES DAILY 10/02/23 1404      10/02/23 1430  oxyCODONE (ROXICODONE) tablet 15 mg         15 mg Oral 5 TIMES DAILY 10/02/23 1402      10/02/23 1430  polyethylene glycol (MIRALAX) Packet 17 g         17 g Oral DAILY 10/02/23 1404      10/02/23 1430  HYDROmorphone (DILAUDID) PCA 0.2 mg/mL OPIOID TOLERANT          Intravenous CONTINUOUS 10/02/23 1404      10/02/23 1430  HYDROmorphone (DILAUDID) PCA 0.2 mg/mL OPIOID NAIVE (age less than 65 years)          Intravenous CONTINUOUS 10/02/23 1404      10/02/23 1400  polyethylene glycol  "(MIRALAX) Packet 17 g         17 g Oral DAILY 10/02/23 1355      10/02/23 1357  lactulose (CHRONULAC) solution 20 g        Note to Pharmacy: PTA Sig:Take 20 g by mouth daily as needed      20 g Oral DAILY PRN 10/02/23 1402      10/02/23 1349  fentaNYL (PF) (SUBLIMAZE) injection 25 mcg         25 mcg Intravenous EVERY 5 MIN PRN 10/02/23 1349      10/02/23 1349  fentaNYL (PF) (SUBLIMAZE) injection 50 mcg         50 mcg Intravenous EVERY 5 MIN PRN 10/02/23 1349      10/02/23 1349  HYDROmorphone (DILAUDID) injection 0.2 mg         0.2 mg Intravenous EVERY 5 MIN PRN 10/02/23 1349      10/02/23 1349  HYDROmorphone (DILAUDID) injection 0.4 mg         0.4 mg Intravenous EVERY 5 MIN PRN 10/02/23 1349                Physical Exam:  Vitals: /53   Pulse 69   Temp 97.5  F (36.4  C) (Oral)   Resp 24   Ht 1.803 m (5' 11\")   Wt 104.8 kg (231 lb 0.7 oz)   SpO2 100%   BMI 32.22 kg/m      Physical Exam:   CONSTITUTIONAL/GENERAL APPEARANCE: NAD, Sleepy, lying in bed.   ENT/NECK: atraumatic  RESPIRATORY: non-labored breathing.   CV: RR  MUSCULOSKELETAL/BACK/SPINE/EXTREMITIES: Endorses left sided chest wall pain.   NEURO: Sleepy. Will awaken and Answers questions appropriately          Acute Inpatient Pain Service [facility]  Hours of pain coverage 24/7   Page via [employer]- Please Page the Pain Team Via [employer]: \"PAIN MANAGEMENT ACUTE INPATIENT/ [facility]\"  "

## 2023-10-02 NOTE — ANESTHESIA PROCEDURE NOTES
Arterial Line Procedure Note    Pre-Procedure   Staff -        Anesthesiologist:  Ihsan Alex MD       Resident/Fellow: Jorge Denise MD       Performed By: resident       Location: OR       Pre-Anesthestic Checklist: patient identified, IV checked, risks and benefits discussed, informed consent, monitors and equipment checked, pre-op evaluation and at physician/surgeon's request  Timeout:       Correct Patient: Yes        Correct Procedure: Yes        Correct Site: Yes        Correct Position: Yes   Line Placement:   This line was placed Post Induction  Procedure   Procedure: arterial line       Diagnosis: hemodynamic monitoring       Laterality: left       Insertion Site: radial.  Sterile Prep        Standard elements of sterile barrier followed       Skin prep: Chloraprep  Insertion/Injection        Technique: Seldinger Technique        Catheter Type/Size: 20 G, 12 cm  Narrative         Secured by: suture       Tegaderm dressing used.       Complications: None apparent,        Arterial waveform: Yes        IBP within 10% of NIBP: Yes

## 2023-10-02 NOTE — BRIEF OP NOTE
[facility]    Brief Operative Note    Pre-operative diagnosis: Lung nodule [R91.1]  Post-operative diagnosis Same    Procedure: LEFT THORACOSCOPIC UPPER LOBE WEDGE RESECTION, SEGMENTECTOMY, LEFT THORACOTOMY, BRONCHOSCOPY, Left - Chest    Surgeon: Surgeon(s) and Role:     * Jorge Gotti MD - Primary     * Karlee Tena MD - Fellow - Assisting  Anesthesia: General   Estimated Blood Loss: 500 mL from 10/2/2023  7:26 AM to 10/2/2023  1:41 PM      Drains: 28 Fr pleural tube  Specimens:   ID Type Source Tests Collected by Time Destination   1 : Left upper lobe wedge Tissue Lung, Upper Lobe, Left SURGICAL PATHOLOGY EXAM Jorge Gotti MD 10/2/2023  9:24 AM    2 : Left Upper Lobe Wedge #2 stitch on nodule Tissue Lung, Upper Lobe, Left SURGICAL PATHOLOGY EXAM, RESEARCH SPECIMEN FOR BIONET TESTING Jorge Gotti MD 10/2/2023  9:20 AM    3 : Level 7 Tissue Lymph Node(s) SURGICAL PATHOLOGY EXAM Jorge Gotti MD 10/2/2023  9:42 AM    4 : 9L Tissue Lymph Node(s) SURGICAL PATHOLOGY EXAM Jorge Gotti MD 10/2/2023  9:56 AM    5 : 10L Tissue Lymph Node(s) SURGICAL PATHOLOGY EXAM Jorge Gotti MD 10/2/2023  9:57 AM    6 : 11L Tissue Lymph Node(s) SURGICAL PATHOLOGY EXAM Jorge Gotti MD 10/2/2023 10:19 AM    7 : level 5 Tissue Lymph Node(s) SURGICAL PATHOLOGY EXAM Jorge Gotti MD 10/2/2023 11:00 AM    8 : Left upper lobe wedge #3 Tissue Lung, Upper Lobe, Left SURGICAL PATHOLOGY EXAM Jorge Gotti MD 10/2/2023 11:04 AM    9 : 12L Tissue Lymph Node(s) SURGICAL PATHOLOGY EXAM Jorge Gotti MD 10/2/2023 11:44 AM    10 : Completion S1-S3 segmentectomy Tissue Lung, Upper Lobe, Left SURGICAL PATHOLOGY EXAM Jorge Gotti MD 10/2/2023 11:59 AM      Findings:   ALYSA nodule - NSCLC on frozen section. Completion S1-S3 segmentectomy.   Complications:  Intra-op  bleeding, conversion to thoracotomy  Implants: None

## 2023-10-02 NOTE — ADDENDUM NOTE
Addendum  created 10/02/23 1453 by Jorge Denise MD    Flowsheet accepted, Intraprocedure Meds edited

## 2023-10-02 NOTE — ANESTHESIA POSTPROCEDURE EVALUATION
Patient: Alex Frost    Procedure: Procedure(s):  LEFT THORACOSCOPIC UPPER LOBE WEDGE RESECTION, SEGMENTECTOMY, LEFT THORACOTOMY, BRONCHOSCOPY       Anesthesia Type:  General    Note:  Disposition: Inpatient   Postop Pain Control: Uneventful            Sign Out: ONGOING pain issues   PONV: No   Neuro/Psych: Uneventful            Sign Out: Acceptable/Baseline neuro status   Airway/Respiratory: Uneventful            Sign Out: Acceptable/Baseline resp. status   CV/Hemodynamics: Uneventful            Sign Out: Acceptable CV status; No obvious hypovolemia; No obvious fluid overload   Other NRE: NONE   DID A NON-ROUTINE EVENT OCCUR? No           Last vitals:  Vitals Value Taken Time   BP 92/76 10/02/23 1356   Temp     Pulse 65 10/02/23 1359   Resp 15 10/02/23 1359   SpO2 94 % 10/02/23 1359   Vitals shown include unvalidated device data.    Electronically Signed By: Ihsan Alex MD  October 2, 2023  2:00 PM

## 2023-10-02 NOTE — OP NOTE
Preoperative diagnosis:    Postoperative diagnosis: The same as preop  Procedure:   Flexible bronchoscopy  Uniportal thoracoscopic LEFT upper lobe segmentectomy (S1-3)   Uniportal thoracoscopic LEFT upper lobe segmentectomy (S1-3) converted to thoracotomy  Mediastinal lymphadenectomy  Anesthesia: General  Surgeon: Jorge Gotti (present and participated in the entire procedure)  Resident surgeon: Karlee Tena  EBL: 500  Complications: None  Findings:  Bronchoscopy: initial exam normal and intraop exam no stenosis  Thoracoscopy: difficult due to inflammation around broncho-vascular structures  Bleeding from the origin of A2, probably because a small hem-o-lock clip slipped partial of. Controlled with pressure, thoracotomy and proximal control of the LEFT main PA. No intraoperative hypotension from bleeding, no uncontrolled bleeding at any point.  Frozen section:  Wedge resection = NSCLC  Patient tolerance: Procedure tolerated well and without complication      Brief history  Mr. Frost has good PFT, but he has limited exercise capacity despite exercising more of late. He cannot walk more than 2 blocks without stopping. For this reason, we planned to do only an S1-3 segmentectomy.     Description of procedure  We first performed a bronchoscopy to verify double-lumen endotracheal tube position and to examine the airways with the above-mentioned findings.  We then secured lAex Frost in the RIGHT lateral position and the LEFT chest was prepared and draped.   We made a single 3 cm access incision in the 5th intercostal space and split the chest wall muscles. We then incised the intercostal muscle along the rib for ~15 cm and placed a wound protector.  We then clearly dissected and identified the bronchovascular structures of the LEFT upper lobe by performing a thorough hilar lymph node dissection. We then clearly identified the vein draining S1-3 and the lingular vein. We a stapler the S1-3 vein. Next, we dissected the  first branches of the PA and ligatures and an energy device the first branch which was quite small. We identified only 1 additional posterior artery and clipped it with a small hem-o-lock clip. When we transected it, it appeared as if part of the clip slipped off. We controlled it immediately with a sponge-stick without difficulty. After re-evaluating it with thoracoscopy, I decided it would be safer to perform a thoracotomy and control the left main PA centrally. We converted the access incision to a thoracotomy. I dissected the proximal PA free and encircled it with a vessel loop. This then allowed us to remove the sponge-stick and repair the bleeding site with a single pledgeted 4-0 prolene stitch.  The bronchus to S1-3 was then clearly evident and we a stapler it.    Finally, we transected the parenchyma with a stapler carefully preserving the hilum of the lingula.    We performed an extensive mediastinal lymphadenectomy of lymph node stations 11, 12, 9, 7, and 5.    At the end of the procedure, we verified that the lung insufflated well and that the orientation of the remaining lung was appropriate. We tacked the lingula to the lower lobe with an additional staple-load.    We verified that hemostasis was adequate and that the bronchus stump was not leaking with insufflation under immersion. Next, we cryoablated the intercostal nerves. We then placed a 24 Nepali chest tube and closed with absorbable sutures after re-approximating the ribs with ethibond stitches. Of note, we removed a portion of what we thought was the 5th rib to have easier access at the time of thoracotomy.

## 2023-10-02 NOTE — ANESTHESIA CARE TRANSFER NOTE
Patient: Alex Frost    Procedure: Procedure(s):  LEFT THORACOSCOPIC UPPER LOBE WEDGE RESECTION, SEGMENTECTOMY, LEFT THORACOTOMY, BRONCHOSCOPY       Diagnosis: Lung nodule [R91.1]  Diagnosis Additional Information: No value filed.    Anesthesia Type:   General     Note:    Oropharynx: oropharynx clear of all foreign objects and spontaneously breathing  Level of Consciousness: drowsy  Oxygen Supplementation: face mask  Level of Supplemental Oxygen (L/min / FiO2): 8  Independent Airway: airway patency satisfactory and stable  Dentition: dentition unchanged  Vital Signs Stable: post-procedure vital signs reviewed and stable  Report to RN Given: handoff report given  Patient transferred to: PACU    Handoff Report: Identifed the Patient, Identified the Reponsible Provider, Reviewed the pertinent medical history, Discussed the surgical course, Reviewed Intra-OP anesthesia mangement and issues during anesthesia, Set expectations for post-procedure period and Allowed opportunity for questions and acknowledgement of understanding    Vitals:  Vitals Value Taken Time   /53 10/02/23 1344   Temp     Pulse 65 10/02/23 1356   Resp 13 10/02/23 1356   SpO2 98 % 10/02/23 1356   Vitals shown include unvalidated device data.    Electronically Signed By: Jorge Galaviz MD  October 2, 2023  1:58 PM

## 2023-10-03 ENCOUNTER — APPOINTMENT (OUTPATIENT)
Dept: GENERAL RADIOLOGY | Facility: CLINIC | Age: 63
End: 2023-10-03
Attending: THORACIC SURGERY (CARDIOTHORACIC VASCULAR SURGERY)
Payer: COMMERCIAL

## 2023-10-03 ENCOUNTER — APPOINTMENT (OUTPATIENT)
Dept: OCCUPATIONAL THERAPY | Facility: CLINIC | Age: 63
End: 2023-10-03
Attending: STUDENT IN AN ORGANIZED HEALTH CARE EDUCATION/TRAINING PROGRAM
Payer: COMMERCIAL

## 2023-10-03 LAB
ALBUMIN SERPL BCG-MCNC: 4 G/DL (ref 3.5–5.2)
ALP SERPL-CCNC: 38 U/L (ref 40–129)
ALT SERPL W P-5'-P-CCNC: 22 U/L (ref 0–70)
ANION GAP SERPL CALCULATED.3IONS-SCNC: 10 MMOL/L (ref 7–15)
ANION GAP SERPL CALCULATED.3IONS-SCNC: 9 MMOL/L (ref 7–15)
AST SERPL W P-5'-P-CCNC: 79 U/L (ref 0–45)
BILIRUB SERPL-MCNC: 0.5 MG/DL
BUN SERPL-MCNC: 7.4 MG/DL (ref 8–23)
BUN SERPL-MCNC: 7.8 MG/DL (ref 8–23)
CALCIUM SERPL-MCNC: 8.5 MG/DL (ref 8.8–10.2)
CALCIUM SERPL-MCNC: 8.8 MG/DL (ref 8.8–10.2)
CHLORIDE SERPL-SCNC: 98 MMOL/L (ref 98–107)
CHLORIDE SERPL-SCNC: 99 MMOL/L (ref 98–107)
CREAT SERPL-MCNC: 0.89 MG/DL (ref 0.67–1.17)
CREAT SERPL-MCNC: 0.95 MG/DL (ref 0.67–1.17)
DEPRECATED HCO3 PLAS-SCNC: 26 MMOL/L (ref 22–29)
DEPRECATED HCO3 PLAS-SCNC: 26 MMOL/L (ref 22–29)
EGFRCR SERPLBLD CKD-EPI 2021: 90 ML/MIN/1.73M2
EGFRCR SERPLBLD CKD-EPI 2021: >90 ML/MIN/1.73M2
ERYTHROCYTE [DISTWIDTH] IN BLOOD BY AUTOMATED COUNT: 12.1 % (ref 10–15)
GLUCOSE BLDC GLUCOMTR-MCNC: 131 MG/DL (ref 70–99)
GLUCOSE SERPL-MCNC: 143 MG/DL (ref 70–99)
GLUCOSE SERPL-MCNC: 157 MG/DL (ref 70–99)
HCT VFR BLD AUTO: 26.9 % (ref 40–53)
HGB BLD-MCNC: 9.1 G/DL (ref 13.3–17.7)
MAGNESIUM SERPL-MCNC: 1.9 MG/DL (ref 1.7–2.3)
MCH RBC QN AUTO: 30.5 PG (ref 26.5–33)
MCHC RBC AUTO-ENTMCNC: 33.8 G/DL (ref 31.5–36.5)
MCV RBC AUTO: 90 FL (ref 78–100)
PLATELET # BLD AUTO: 168 10E3/UL (ref 150–450)
POTASSIUM SERPL-SCNC: 4 MMOL/L (ref 3.4–5.3)
POTASSIUM SERPL-SCNC: 4 MMOL/L (ref 3.4–5.3)
PROT SERPL-MCNC: 5.8 G/DL (ref 6.4–8.3)
RBC # BLD AUTO: 2.98 10E6/UL (ref 4.4–5.9)
SODIUM SERPL-SCNC: 133 MMOL/L (ref 135–145)
SODIUM SERPL-SCNC: 135 MMOL/L (ref 135–145)
WBC # BLD AUTO: 8.7 10E3/UL (ref 4–11)

## 2023-10-03 PROCEDURE — 36415 COLL VENOUS BLD VENIPUNCTURE: CPT

## 2023-10-03 PROCEDURE — 71045 X-RAY EXAM CHEST 1 VIEW: CPT | Mod: 26 | Performed by: RADIOLOGY

## 2023-10-03 PROCEDURE — 999N000147 HC STATISTIC PT IP EVAL DEFER

## 2023-10-03 PROCEDURE — 250N000011 HC RX IP 250 OP 636: Performed by: STUDENT IN AN ORGANIZED HEALTH CARE EDUCATION/TRAINING PROGRAM

## 2023-10-03 PROCEDURE — 36415 COLL VENOUS BLD VENIPUNCTURE: CPT | Performed by: STUDENT IN AN ORGANIZED HEALTH CARE EDUCATION/TRAINING PROGRAM

## 2023-10-03 PROCEDURE — 85027 COMPLETE CBC AUTOMATED: CPT | Performed by: PHYSICIAN ASSISTANT

## 2023-10-03 PROCEDURE — 250N000013 HC RX MED GY IP 250 OP 250 PS 637: Performed by: PHYSICIAN ASSISTANT

## 2023-10-03 PROCEDURE — 99233 SBSQ HOSP IP/OBS HIGH 50: CPT | Performed by: PHYSICIAN ASSISTANT

## 2023-10-03 PROCEDURE — 999N000128 HC STATISTIC PERIPHERAL IV START W/O US GUIDANCE

## 2023-10-03 PROCEDURE — 80053 COMPREHEN METABOLIC PANEL: CPT | Performed by: STUDENT IN AN ORGANIZED HEALTH CARE EDUCATION/TRAINING PROGRAM

## 2023-10-03 PROCEDURE — 36415 COLL VENOUS BLD VENIPUNCTURE: CPT | Performed by: PHYSICIAN ASSISTANT

## 2023-10-03 PROCEDURE — 250N000013 HC RX MED GY IP 250 OP 250 PS 637: Performed by: STUDENT IN AN ORGANIZED HEALTH CARE EDUCATION/TRAINING PROGRAM

## 2023-10-03 PROCEDURE — 258N000003 HC RX IP 258 OP 636: Performed by: STUDENT IN AN ORGANIZED HEALTH CARE EDUCATION/TRAINING PROGRAM

## 2023-10-03 PROCEDURE — 97165 OT EVAL LOW COMPLEX 30 MIN: CPT | Mod: GO

## 2023-10-03 PROCEDURE — 120N000003 HC R&B IMCU UMMC

## 2023-10-03 PROCEDURE — 999N000065 XR CHEST PORT 1 VIEW

## 2023-10-03 PROCEDURE — 97530 THERAPEUTIC ACTIVITIES: CPT | Mod: GO

## 2023-10-03 PROCEDURE — 83735 ASSAY OF MAGNESIUM: CPT

## 2023-10-03 PROCEDURE — 250N000013 HC RX MED GY IP 250 OP 250 PS 637: Performed by: THORACIC SURGERY (CARDIOTHORACIC VASCULAR SURGERY)

## 2023-10-03 PROCEDURE — 250N000011 HC RX IP 250 OP 636: Mod: JZ | Performed by: PHYSICIAN ASSISTANT

## 2023-10-03 RX ORDER — HYDROMORPHONE HYDROCHLORIDE 1 MG/ML
.3-.5 INJECTION, SOLUTION INTRAMUSCULAR; INTRAVENOUS; SUBCUTANEOUS
Status: DISCONTINUED | OUTPATIENT
Start: 2023-10-03 | End: 2023-10-06

## 2023-10-03 RX ORDER — ENOXAPARIN SODIUM 100 MG/ML
40 INJECTION SUBCUTANEOUS EVERY 24 HOURS
Status: DISCONTINUED | OUTPATIENT
Start: 2023-10-03 | End: 2023-10-07 | Stop reason: HOSPADM

## 2023-10-03 RX ADMIN — CLONAZEPAM 1 MG: 1 TABLET ORAL at 19:44

## 2023-10-03 RX ADMIN — METHOCARBAMOL 750 MG: 750 TABLET, FILM COATED ORAL at 14:40

## 2023-10-03 RX ADMIN — UMECLIDINIUM 1 PUFF: 62.5 AEROSOL, POWDER ORAL at 08:56

## 2023-10-03 RX ADMIN — HYDROMORPHONE HYDROCHLORIDE 0.3 MG: 1 INJECTION, SOLUTION INTRAMUSCULAR; INTRAVENOUS; SUBCUTANEOUS at 18:54

## 2023-10-03 RX ADMIN — OXYCODONE HYDROCHLORIDE 15 MG: 5 TABLET ORAL at 12:17

## 2023-10-03 RX ADMIN — ALBUTEROL SULFATE 4 PUFF: 90 AEROSOL, METERED RESPIRATORY (INHALATION) at 19:44

## 2023-10-03 RX ADMIN — HYDROMORPHONE HYDROCHLORIDE 0.5 MG: 1 INJECTION, SOLUTION INTRAMUSCULAR; INTRAVENOUS; SUBCUTANEOUS at 20:51

## 2023-10-03 RX ADMIN — POLYETHYLENE GLYCOL 3350 17 G: 17 POWDER, FOR SOLUTION ORAL at 08:57

## 2023-10-03 RX ADMIN — MAGNESIUM HYDROXIDE 30 ML: 400 SUSPENSION ORAL at 14:40

## 2023-10-03 RX ADMIN — CLONAZEPAM 1 MG: 1 TABLET ORAL at 08:58

## 2023-10-03 RX ADMIN — RISPERIDONE 1 MG: 1 TABLET ORAL at 19:44

## 2023-10-03 RX ADMIN — ACETAMINOPHEN 975 MG: 325 TABLET, FILM COATED ORAL at 18:07

## 2023-10-03 RX ADMIN — OXYCODONE HYDROCHLORIDE 15 MG: 5 TABLET ORAL at 14:47

## 2023-10-03 RX ADMIN — ACETAMINOPHEN 975 MG: 325 TABLET, FILM COATED ORAL at 02:36

## 2023-10-03 RX ADMIN — OXYCODONE HYDROCHLORIDE 15 MG: 5 TABLET ORAL at 08:55

## 2023-10-03 RX ADMIN — HYDROMORPHONE HYDROCHLORIDE 0.5 MG: 1 INJECTION, SOLUTION INTRAMUSCULAR; INTRAVENOUS; SUBCUTANEOUS at 23:30

## 2023-10-03 RX ADMIN — ALBUTEROL SULFATE 4 PUFF: 90 AEROSOL, METERED RESPIRATORY (INHALATION) at 12:18

## 2023-10-03 RX ADMIN — ACETAMINOPHEN 975 MG: 325 TABLET, FILM COATED ORAL at 09:03

## 2023-10-03 RX ADMIN — MORPHINE SULFATE 30 MG: 15 TABLET, EXTENDED RELEASE ORAL at 21:30

## 2023-10-03 RX ADMIN — SENNOSIDES AND DOCUSATE SODIUM 1 TABLET: 50; 8.6 TABLET ORAL at 08:57

## 2023-10-03 RX ADMIN — FLUTICASONE FUROATE AND VILANTEROL TRIFENATATE 1 PUFF: 100; 25 POWDER RESPIRATORY (INHALATION) at 08:56

## 2023-10-03 RX ADMIN — LACTULOSE 20 G: 20 SOLUTION ORAL at 16:49

## 2023-10-03 RX ADMIN — ENOXAPARIN SODIUM 40 MG: 40 INJECTION SUBCUTANEOUS at 15:32

## 2023-10-03 RX ADMIN — ALBUTEROL SULFATE 4 PUFF: 90 AEROSOL, METERED RESPIRATORY (INHALATION) at 08:56

## 2023-10-03 RX ADMIN — ALBUTEROL SULFATE 4 PUFF: 90 AEROSOL, METERED RESPIRATORY (INHALATION) at 15:32

## 2023-10-03 RX ADMIN — RISPERIDONE 2 MG: 2 TABLET ORAL at 08:56

## 2023-10-03 RX ADMIN — SODIUM CHLORIDE 0.5 MG/MIN: 9 INJECTION, SOLUTION INTRAVENOUS at 11:56

## 2023-10-03 RX ADMIN — NALOXEGOL OXALATE 25 MG: 25 TABLET, FILM COATED ORAL at 08:57

## 2023-10-03 RX ADMIN — PAROXETINE 40 MG: 40 TABLET, FILM COATED ORAL at 08:55

## 2023-10-03 RX ADMIN — OXYCODONE HYDROCHLORIDE 15 MG: 5 TABLET ORAL at 18:07

## 2023-10-03 RX ADMIN — SENNOSIDES AND DOCUSATE SODIUM 2 TABLET: 50; 8.6 TABLET ORAL at 19:44

## 2023-10-03 ASSESSMENT — ACTIVITIES OF DAILY LIVING (ADL)
ADLS_ACUITY_SCORE: 28

## 2023-10-03 NOTE — PLAN OF CARE
Goal Outcome Evaluation:      Plan of Care Reviewed With: patient    Overall Patient Progress: improvingOverall Patient Progress: improving    Outcome Evaluation: PCA pump and capnography discontinued      Patient POD 1 after left thoracoscopic upper lobe wedge resection, segmentectomy and left thoracotomy.  PMH of HTN, HLD, GERD, COPD, SUDARSHAN, anemia, fibromyalgia, anxiety, depression, and chronic neck and back pain.     Neuro: A&O x4, denied dizziness.  Respiratory:  Had shallow breathing and dyspnea on exertion.  Lung sounds diminished in left lung base.  Encouraged IS use and provided education on function of IS.  Cardiac: No abnormal heart sounds noted.  Had 1-2+ edema in ankles and feet.    GI: Denied nausea, reported no BM since 10/1, bowel sounds normo- to hypoactive.  Was given scheduled bowel medications, no BM during shift.  : Had significant urine output, see I&O flowsheet.  Skin: See PCS for assessment and treatment of wounds and surgical incisions.   VS: In sinus rhythm with BBB, HR 60s-90s, SBP 100s-110s, SaO2 92-98% on room air.    Drips:  Amiodarone gtt continued at 0.5 mg/min (16.67 ml/hr).  PCA pump discontinued during shift.    Electrolytes: No replacements required.  Pain: Reported incisional pain that was managed with hydromorphone via PCA pump, prn and scheduled medications, see MAR.  Tests/procedures: Chest X-ray performed during shift, see Chart Review for results.    Mobility: Ambulated in hallway with 1-person assist and use of walker and gait belt.    Plan:  Continue to monitor pain, VS, heart rhythm, skin and surgical site integrity, fluid status, bowel status, cardiac and respiratory status.  Notify care team of changes in patient condition or other concerns.  Expected to have chest X-ray in AM.  Encourage cough/deep breathing/IS use to promote lung function.  Encourage regular repositioning to promote skin integrity.

## 2023-10-03 NOTE — PLAN OF CARE
Pt admitted 10/2 for a L upper lung lobe wedge resection, segmentectomy, thoracotomy, broncoscopy, and mediastinal lymphadenectomy r/t a concerning nodule in the L upper lung. PMH includes HTN, HLD, COPD, sleep apnea, anemia, fibromyalgia, chronic neck and back pain, GERD, anxiety and depression.    Neuro: A&O x 4. Slow, logical speech. Afebrile. Pleasant affect. Calls appropriately. Slept well overnight.   Cardiac: SR 70s to 80s. SBP 100s. Denies dizziness, chest pain, or palpitations.   Respiratory: Sating well on 2L NC. On Capnography monitoring. LS clear. Denies cough.   GI/: Good UOP via urinal. LBM 10/1. Denies nausea.   Diet/Appetite: Regular   Skin: Reddened sacrum - covered with mepilex. L thoracotomy site - covered with gauze.   LDA:   - R PIV: infusing Amio @ 0.5mg/min (16.67mL/hr)  - L PIV: infusing LR @ 30mL/hr, Y-sited with Dilaudid PCA 0.3mg.   - L pleural CT to water seal (190mL total output), covered with gauze.   Activity: Assist x 1 with GB  Pain: 7/10 incisional pain - Dilaudid PCA & scheduled pain medications provide moderate relief.     Plan: Continue to monitor and alert team (Thoracic) with any changes or concerns.

## 2023-10-03 NOTE — PLAN OF CARE
I/A: A/Ox4, lethargic, slow speech. VSS on 2L NC. Capno in place, MD notified of abnomal ETCO2s and assessed pt at beside. SR/ST on tele. Incisional pain managed with PCA, 15 mg sylwia oxy 5x/daily, tylenol and MS contin. L CT to H20 seal. Thoracomty site bleeding and dressing reinforced. Tolerating oral intake, no nausea. Adequate UOP. LBM 10/1. A1 w/ GB. Call light and pain pump button within reach.    Drips: Amio gtt at 0.5 mg/min via R PIV  Dilaudid PCA Y'd w/ LR at 30 ml/hr via L PIV    P: Encourage oral intake, pulm hygiene, IS and activity.     Hours of care: until 2300

## 2023-10-03 NOTE — PROGRESS NOTES
"Pain Service Progress Note  [facility]  Date: 10/03/2023       Patient Name: Alex Frost  MRN: [id number]  Age: 62 year old  Sex: male      Assessment:  Alex Frost is a 62 year old male who has PMH of sleep apnea, chronic low back pain on chronic opioid pain management at [facility] pain clinic, GERD, left lung nodule now s/p left thoracoscopic upper lobe wedge resection, segmentectomy, left thoracotomy on 10/2/23.     Pain team has been following to assist with pain after thoracotomy in patient with chronic pain.  PTA, he was taking MS Contin 30mg once at 8 pm and then oxycodone 15mg every 2.5 hours x5 during the day as prescribed by [facility].  (Please note that MS contin is written as 15mg BID-Alex states that pain clinic is aware that he is taking it differently than prescribed.).    Alex states pain is \"higher\" this morning. Pain is located on the left upper chest.  Pain is at 6.5/10 pain level.  At home baseline pain level is 3-4/10.  States Dilaudid PCA 0.3mg Q 10 minutes is providing relief.  We discussed transitioning off PCA Dilaudid to oral opioids, however Alex feels like PCA Dilaudid is helpful.      Discussed epidural catheter placement for analgesia but he declined stating he \"doesn't trust them.\"  Explained the procedure and how it can be very helpful however he still declined.    Plan/Recommendations:  Recommend discontinuing PCA Dilaudid.  Continue MS Contin 30mg at bedtime ( PTA, takes at 8pm).  change oxycodone 15mg PO Q 3 hours PRN.   -if needed, may opioid rotate by stopping oxycodone and start Dilaudid 6mg PO Q 3 hours PRN.  Start Dilaudid 0.3-0.5mg IV Q 2 hours PRN.  Acetaminophen 975mg PO Q 8 hours.  Aggressive Bowel regimen. States last BM was \"several days ago.\"   PTA, has chronic constipation.    Pain Service will continue to follow.    Discussed with attending anesthesiologist    Nereida Fairbanks PA-C  10/03/2023         Tubes/Drains: yes, chest " "tube.      Diet: Advance Diet as Tolerated: Regular Diet Adult    Relevant Labs:  Recent Labs   Lab Test 10/03/23  0751 10/02/23  1805 10/02/23  1425 01/10/18  1253 01/05/18  1355   INR  --   --   --   --  1.02      < > 164   < > 228   PTT  --   --   --   --  28   BUN  --   --  7.2*   < > 10    < > = values in this interval not displayed.       Physical Exam:  Vitals: /68 (BP Location: Left arm)   Pulse 95   Temp 98.8  F (37.1  C) (Oral)   Resp 14   Ht 1.803 m (5' 11\")   Wt 107.8 kg (237 lb 11.2 oz)   SpO2 96%   BMI 33.15 kg/m      Physical Exam:   CONSTITUTIONAL/GENERAL APPEARANCE: Conversant.  EYES: EOMI, sclerae clear  ENT/NECK: neck is supple  RESPIRATORY:non labored breathing, on room air  CARDIOVASCULAR: HR within normal limits  GI: round  MUSCULOSKELETAL/BACK/SPINE/EXTREMITIES: Moving UE and LE independently.     NEURO:  AAOx3.   SKIN/VASCULAR EXAM:  Dry and warm.  PSYCHIATRIC/BEHAVIORAL/OBSERVATIONS:  No objective signs of pain observed during our interview.   Judgment/Insight -fair   Orientation - x3   Memory -fair   Mood and affect - calm, pleasant, cooperative         Relevant Medications:  Current Pain Medications:  Medications related to Pain Management (From now, onward)      Start     Dose/Rate Route Frequency Ordered Stop    10/05/23 0000  acetaminophen (TYLENOL) tablet 650 mg         650 mg Oral EVERY 4 HOURS PRN 10/02/23 1746      10/03/23 0730  naloxegol tablet 25 mg         25 mg Oral EVERY MORNING BEFORE BREAKFAST 10/02/23 1534      10/02/23 2200  morphine (MS CONTIN) 12 hr tablet 30 mg        Note to Pharmacy: PTA Sig:Take 30 mg by mouth At Bedtime      30 mg Oral AT BEDTIME 10/02/23 1402      10/02/23 2000  clonazePAM (klonoPIN) tablet 1 mg        Note to Pharmacy: PTA Sig:Take 2 mg (2 tablets) in the morning and take 1 mg in the afternoon.      1 mg Oral 2 TIMES DAILY 10/02/23 1402      10/02/23 2000  senna-docusate (SENOKOT-S/PERICOLACE) 8.6-50 MG per tablet 2 tablet      " "  See Hyperspace for full Linked Orders Report.    2 tablet Oral 2 TIMES DAILY 10/02/23 1404      10/02/23 1900  HYDROmorphone (DILAUDID) PCA 0.2 mg/mL OPIOID TOLERANT          Intravenous CONTINUOUS 10/02/23 1840      10/02/23 1800  acetaminophen (TYLENOL) tablet 975 mg         975 mg Oral EVERY 8 HOURS 10/02/23 1746 10/05/23 1759    10/02/23 1746  magnesium hydroxide (MILK OF MAGNESIA) suspension 30 mL         30 mL Oral DAILY PRN 10/02/23 1746      10/02/23 1746  bisacodyl (DULCOLAX) suppository 10 mg         10 mg Rectal DAILY PRN 10/02/23 1746      10/02/23 1746  methocarbamol (ROBAXIN) tablet 750 mg         750 mg Oral EVERY 6 HOURS PRN 10/02/23 1746      10/02/23 1430  oxyCODONE (ROXICODONE) tablet 15 mg         15 mg Oral 5 TIMES DAILY 10/02/23 1402      10/02/23 1400  polyethylene glycol (MIRALAX) Packet 17 g         17 g Oral DAILY 10/02/23 1355      10/02/23 1357  lactulose (CHRONULAC) solution 20 g        Note to Pharmacy: PTA Sig:Take 20 g by mouth daily as needed      20 g Oral DAILY PRN 10/02/23 1402              Primary Service Contacted with Recommendations? Yes            Acute Inpatient Pain Service [facility]  Hours of pain coverage 24/7   Page via Amcom- Please Page the Pain Team Via Amcom: \"PAIN MANAGEMENT ACUTE INPATIENT/ [facility]\"            "

## 2023-10-03 NOTE — PHARMACY-ADMISSION MEDICATION HISTORY
Medication history completed 9/22 by David Win.   Confirmed with patient this morning that there have no medication changes since the medication history completed in clinic. Confirmed that he does in fact take 5 senna tablets every night as well.    Herminia Younger, VitaD, BCPS

## 2023-10-03 NOTE — PROGRESS NOTES
Admission          10/2/2023  5:35 AM  -----------------------------------------------------------  Diagnosis: S/p thoracotomy     Admitted from: home  Report given from: PACU  Via: Stretcher  Accompanied by: spouse and RN  Family Aware of Admission: yes  Belongings: glasses, cell phone, clothes. Medications sent to inpatient pharmacy  Admission Profile: Complete  Teaching: Orientation to unit, call don't fall, use of call light, meal times, visiting hours,  when to call for the RN (angina/sob/dizzyness, etc.), and enforced importance of safety.  Access: PIV x2  Telemetry: Placed on pt  Ht./Wt.: Completed earlier today  2 RN skin assessment: Completed with Jackeline FARRELL RN. See flowsheets.    Temp:  [97.5  F (36.4  C)-97.9  F (36.6  C)] 97.9  F (36.6  C)  Pulse:  [64-89] 85  Resp:  [12-24] 12  BP: ()/() 107/69  Cuff Mean (mmHg):  [101] 101  MAP:  [46 mmHg-97 mmHg] 91 mmHg  Arterial Line BP: ()/(35-72) 145/69  SpO2:  [96 %-100 %] 96 %

## 2023-10-03 NOTE — PROGRESS NOTES
"  Thoracic Surgery Progress Note  Surgery [facility]  Post Op Check    10/02/2023    Alex Frost is a 62 year old male POD#0 s/p Procedure(s):  LEFT THORACOSCOPIC UPPER LOBE WEDGE RESECTION, SEGMENTECTOMY, LEFT THORACOTOMY, BRONCHOSCOPY for Pre-Op Diagnosis Codes:     * Lung nodule [R91.1]    Pt reports their pain is moderately controlled with current regimen. Denies shortness of breath or difficulty taking a deep breath, on 1L NC. Tolerating PO intake (mac & cheese) without nausea/vomiting or abdominal pain. He is passing gas but has not had a bowel movement. He has not yet voided since OR.  Denies sternal chest pain, dizziness.     /69   Pulse 85   Temp 97.9  F (36.6  C) (Oral)   Resp 12   Ht 1.803 m (5' 11\")   Wt 104.8 kg (231 lb 0.7 oz)   SpO2 96%   BMI 32.22 kg/m      Gen: A&O x4, NAD   Chest: breathing non-labored on nasal cannula at 1 liter per minute. Symmetric chest wall excursion   Abdomen: soft, non-tender, non-distended  Incision: clean, dry, intact covered by dressings with serosanguinous strikethrough.  Extremities: warm and well perfused  Devices: left chest tube with 130 mL sanguineous output in canister. No air leak present.    A/P:Follow up urine output. Continue plan of care per primary team. Please call with any questions.    Jennifer Sim MD PGY-1  General Surgery Resident    "

## 2023-10-03 NOTE — PROGRESS NOTES
THORACIC & FOREGUT SURGERY    S:  Did well overnight.  Pt seen at bedside resting comfortably.       O:  Vitals:    10/03/23 0439 10/03/23 0807 10/03/23 1000 10/03/23 1122   BP: 105/51 104/55  119/68   BP Location: Right arm Left arm  Left arm   Cuff Size: Adult Large Adult Large  Adult Large   Pulse: 73 76  95   Resp: (!) 9 12  14   Temp: 97.9  F (36.6  C) 97.8  F (36.6  C)  98.8  F (37.1  C)   TempSrc: Oral Oral  Oral   SpO2: 98% 98% 99% 96%   Weight:       Height:           A&O, NAD  Breathing non-labored  RRR per tele  Soft, NDNT  Distal extremities warm    CT output thick serosang, no air leak    Speech mildly slurred but improved during time of exam this AM, neuro exam intact, pt reports dry mouth and says this is causing his speech change    A/P: Alex Frost is a 62 year old male POD#1 s/p LEFT VATS (CONVERTED TO OPEN) SEGMENTECTOMY, LEFT THORACOTOMY   -Continue chest tube today, to water seal  -Likely chest tube out tomorrow  -Possible dispo in 1-3 days, once tube removed and pain controlled  -Lovenox    POSTOP COMPLICATIONS: None     Spencer Childers PA-C

## 2023-10-03 NOTE — PROGRESS NOTES
10/03/23 1000   Appointment Info   Signing Clinician's Name / Credentials (OT) JESSICA Lyn   Student Supervision On-site supervision provided;Direct supervision provided   Living Environment   People in Home child(syl), adult;spouse   Current Living Arrangements house   Home Accessibility stairs within home   Number of Stairs, Within Home, Primary greater than 10 stairs   Stair Railings, Within Home, Primary railings on both sides of stairs   Transportation Anticipated family or friend will provide   Living Environment Comments pt lives in a house with spouse and adult child   Self-Care   Usual Activity Tolerance good   Current Activity Tolerance moderate   Regular Exercise Yes   Activity/Exercise Type walking   Exercise Amount/Frequency 20 mins   Equipment Currently Used at Home none   Fall history within last six months no   Activity/Exercise/Self-Care Comment IND with all ADLs and IADLs at baseline.   Instrumental Activities of Daily Living (IADL)   IADL Comments wife usually does household chores   General Information   Onset of Illness/Injury or Date of Surgery 10/02/23   Referring Physician Karlee Tena MD   Patient/Family Therapy Goal Statement (OT) Go home   Additional Occupational Profile Info/Pertinent History of Current Problem Alex Frost is a 62 year old male with hypertension, hyperlipidemia,COPD, sleep apnea, anemia,fibromyalgia, chronic neck and back pain, GERD, anxiety and depression that has a concerning lung nodule in the left lung.  He had a CT lung cancer screening in 2021 that was negative, but he repeated again this past summer and there was a new finding of a suspicious lung nodule in the anterior apical left upper lobe.  He has since consulted with Dr. Gotti and the above listed procedure has been recommended for further evaluation and treatment.   Existing Precautions/Restrictions thoracotomy   Left Upper Extremity (Weight-bearing Status) partial weight-bearing (PWB)   Right  Upper Extremity (Weight-bearing Status) full weight-bearing (FWB)   Left Lower Extremity (Weight-bearing Status) full weight-bearing (FWB)   Right Lower Extremity (Weight-bearing Status) full weight-bearing (FWB)   Cognitive Status Examination   Orientation Status orientation to person, place and time   Cognitive Status Comments Cognition intact   Visual Perception   Visual Impairment/Limitations corrective lenses full-time   Sensory   Sensory Quick Adds sensation intact   Pain Assessment   Patient Currently in Pain Yes, see Vital Sign flowsheet   Posture   Posture not impaired   Range of Motion Comprehensive   General Range of Motion bilateral upper extremity ROM WNL   Strength Comprehensive (MMT)   General Manual Muscle Testing (MMT) Assessment no strength deficits identified   Muscle Tone Assessment   Muscle Tone Quick Adds No deficits were identified   Coordination   Upper Extremity Coordination No deficits were identified   Bed Mobility   Comment (Bed Mobility) Min A   Transfers   Transfer Comments CGA   Balance   Balance Assessment no deficits were identified   Activities of Daily Living   BADL Assessment/Intervention upper body dressing;bathing;lower body dressing;toileting   Bathing Assessment/Intervention   Colver Level (Bathing) supervision   Assistive Devices (Bathing) shower chair   Upper Body Dressing Assessment/Training   Colver Level (Upper Body Dressing) supervision   Lower Body Dressing Assessment/Training   Colver Level (Lower Body Dressing) minimum assist (75% patient effort)   Toileting   Colver Level (Toileting) minimum assist (75% patient effort)   Clinical Impression   Criteria for Skilled Therapeutic Interventions Met (OT) Yes, treatment indicated   OT Diagnosis decreased activity tolerance, and ADL independence   Influenced by the following impairments bathing, toileting, dressing, transfering   OT Problem List-Impairments impacting ADL problems related to;activity  tolerance impaired;strength;mobility;pain;post-surgical precautions   Assessment of Occupational Performance 1-3 Performance Deficits   Planned Therapy Interventions (OT) ADL retraining;strengthening;home program guidelines;progressive activity/exercise;risk factor education;stretching   Clinical Decision Making Complexity (OT) low complexity   Anticipated Equipment Needs Upon Discharge (OT) shower chair   Risk & Benefits of therapy have been explained evaluation/treatment results reviewed;care plan/treatment goals reviewed;risks/benefits reviewed;current/potential barriers reviewed;participants voiced agreement with care plan;participants included;patient   OT Total Evaluation Time   OT Eval, Low Complexity Minutes (74808) 10   OT Goals   Therapy Frequency (OT) Daily   OT Predicted Duration/Target Date for Goal Attainment 10/10/23   OT Goals Upper Body Dressing;Lower Body Dressing;Toilet Transfer/Toileting;OT Goal 1   OT: Hygiene/Grooming independent   OT: Upper Body Dressing Independent;within precautions   OT: Lower Body Dressing Independent;within precautions   OT: Toilet Transfer/Toileting Independent;within precautions   OT: Goal 1 pt will complete two flight of stairs IND with approrpriate use of raillings   Therapeutic Activities   Therapeutic Activity Minutes (26248) 58   Symptoms noted during/after treatment fatigue;increased pain   Treatment Detail/Skilled Intervention OT: Pt supine in bed, agreeable to therapy. Eval completed and treatment Initated. Facilitated ambulation to promote IND with I/ADLS. Pt rolled from bed with min A to EOB using log roll techinque. Pt completed Sit<>stand with CGA. Pt ambulated from room ~30 ft with CGA and reported feeling fatigue and ambulated back. Pt HR was 122 once back in the room.  Pt requested to be back in bed, Pt got in bed with min A and Th scooted him up in bed. Left pt supine in bed with call light within in reach. Extra time managing vitals and lines.   OT  Discharge Planning   OT Plan Activity tolerance, UB/LB dressing, stairs, Standing ADLs   OT Discharge Recommendation (DC Rec) home with assist;home with outpatient occupational therapy   OT Rationale for DC Rec Pt is below baseline level of function,limited by post op left upper lobe wedge resection. Pt would benefit from OT to assist with ADLs and improve activity tolerance and endurance.   OT Brief overview of current status CGA   Total Session Time   Timed Code Treatment Minutes 58   Total Session Time (sum of timed and untimed services) 68

## 2023-10-04 ENCOUNTER — APPOINTMENT (OUTPATIENT)
Dept: OCCUPATIONAL THERAPY | Facility: CLINIC | Age: 63
End: 2023-10-04
Attending: THORACIC SURGERY (CARDIOTHORACIC VASCULAR SURGERY)
Payer: COMMERCIAL

## 2023-10-04 ENCOUNTER — APPOINTMENT (OUTPATIENT)
Dept: GENERAL RADIOLOGY | Facility: CLINIC | Age: 63
End: 2023-10-04
Attending: THORACIC SURGERY (CARDIOTHORACIC VASCULAR SURGERY)
Payer: COMMERCIAL

## 2023-10-04 LAB
ALBUMIN SERPL BCG-MCNC: 3.5 G/DL (ref 3.5–5.2)
ALP SERPL-CCNC: 45 U/L (ref 40–129)
ALT SERPL W P-5'-P-CCNC: 21 U/L (ref 0–70)
ANION GAP SERPL CALCULATED.3IONS-SCNC: 8 MMOL/L (ref 7–15)
AST SERPL W P-5'-P-CCNC: 65 U/L (ref 0–45)
BILIRUB SERPL-MCNC: 0.5 MG/DL
BUN SERPL-MCNC: 6.2 MG/DL (ref 8–23)
CALCIUM SERPL-MCNC: 8.5 MG/DL (ref 8.8–10.2)
CHLORIDE SERPL-SCNC: 98 MMOL/L (ref 98–107)
CREAT SERPL-MCNC: 0.87 MG/DL (ref 0.67–1.17)
DEPRECATED HCO3 PLAS-SCNC: 27 MMOL/L (ref 22–29)
EGFRCR SERPLBLD CKD-EPI 2021: >90 ML/MIN/1.73M2
ERYTHROCYTE [DISTWIDTH] IN BLOOD BY AUTOMATED COUNT: 12.2 % (ref 10–15)
GLUCOSE BLDC GLUCOMTR-MCNC: 127 MG/DL (ref 70–99)
GLUCOSE SERPL-MCNC: 138 MG/DL (ref 70–99)
HCT VFR BLD AUTO: 22.7 % (ref 40–53)
HGB BLD-MCNC: 7.7 G/DL (ref 13.3–17.7)
MAGNESIUM SERPL-MCNC: 2 MG/DL (ref 1.7–2.3)
MCH RBC QN AUTO: 30.9 PG (ref 26.5–33)
MCHC RBC AUTO-ENTMCNC: 33.9 G/DL (ref 31.5–36.5)
MCV RBC AUTO: 91 FL (ref 78–100)
PLATELET # BLD AUTO: 150 10E3/UL (ref 150–450)
POTASSIUM SERPL-SCNC: 4 MMOL/L (ref 3.4–5.3)
PROT SERPL-MCNC: 5.5 G/DL (ref 6.4–8.3)
RBC # BLD AUTO: 2.49 10E6/UL (ref 4.4–5.9)
SODIUM SERPL-SCNC: 133 MMOL/L (ref 135–145)
WBC # BLD AUTO: 8.6 10E3/UL (ref 4–11)

## 2023-10-04 PROCEDURE — 250N000011 HC RX IP 250 OP 636: Mod: JZ | Performed by: PHYSICIAN ASSISTANT

## 2023-10-04 PROCEDURE — 71045 X-RAY EXAM CHEST 1 VIEW: CPT | Mod: 26 | Performed by: RADIOLOGY

## 2023-10-04 PROCEDURE — 99232 SBSQ HOSP IP/OBS MODERATE 35: CPT | Performed by: PHYSICIAN ASSISTANT

## 2023-10-04 PROCEDURE — 250N000013 HC RX MED GY IP 250 OP 250 PS 637: Performed by: STUDENT IN AN ORGANIZED HEALTH CARE EDUCATION/TRAINING PROGRAM

## 2023-10-04 PROCEDURE — 36415 COLL VENOUS BLD VENIPUNCTURE: CPT | Performed by: STUDENT IN AN ORGANIZED HEALTH CARE EDUCATION/TRAINING PROGRAM

## 2023-10-04 PROCEDURE — 80053 COMPREHEN METABOLIC PANEL: CPT | Performed by: STUDENT IN AN ORGANIZED HEALTH CARE EDUCATION/TRAINING PROGRAM

## 2023-10-04 PROCEDURE — 97530 THERAPEUTIC ACTIVITIES: CPT | Mod: GO

## 2023-10-04 PROCEDURE — 85027 COMPLETE CBC AUTOMATED: CPT | Performed by: STUDENT IN AN ORGANIZED HEALTH CARE EDUCATION/TRAINING PROGRAM

## 2023-10-04 PROCEDURE — 97535 SELF CARE MNGMENT TRAINING: CPT | Mod: GO

## 2023-10-04 PROCEDURE — 120N000003 HC R&B IMCU UMMC

## 2023-10-04 PROCEDURE — 250N000013 HC RX MED GY IP 250 OP 250 PS 637: Performed by: THORACIC SURGERY (CARDIOTHORACIC VASCULAR SURGERY)

## 2023-10-04 PROCEDURE — 83735 ASSAY OF MAGNESIUM: CPT | Performed by: THORACIC SURGERY (CARDIOTHORACIC VASCULAR SURGERY)

## 2023-10-04 PROCEDURE — 250N000013 HC RX MED GY IP 250 OP 250 PS 637: Performed by: PHYSICIAN ASSISTANT

## 2023-10-04 PROCEDURE — 71045 X-RAY EXAM CHEST 1 VIEW: CPT

## 2023-10-04 RX ORDER — AMIODARONE HYDROCHLORIDE 200 MG/1
400 TABLET ORAL 2 TIMES DAILY
Status: DISCONTINUED | OUTPATIENT
Start: 2023-10-04 | End: 2023-10-07 | Stop reason: HOSPADM

## 2023-10-04 RX ORDER — AMIODARONE HYDROCHLORIDE 200 MG/1
200 TABLET ORAL 2 TIMES DAILY
Status: DISCONTINUED | OUTPATIENT
Start: 2023-10-08 | End: 2023-10-07 | Stop reason: HOSPADM

## 2023-10-04 RX ORDER — AMIODARONE HYDROCHLORIDE 200 MG/1
200 TABLET ORAL DAILY
Status: DISCONTINUED | OUTPATIENT
Start: 2023-10-10 | End: 2023-10-07 | Stop reason: HOSPADM

## 2023-10-04 RX ORDER — FUROSEMIDE 20 MG
20 TABLET ORAL DAILY
Status: DISCONTINUED | OUTPATIENT
Start: 2023-10-04 | End: 2023-10-07 | Stop reason: HOSPADM

## 2023-10-04 RX ADMIN — UMECLIDINIUM 1 PUFF: 62.5 AEROSOL, POWDER ORAL at 08:28

## 2023-10-04 RX ADMIN — MORPHINE SULFATE 30 MG: 15 TABLET, EXTENDED RELEASE ORAL at 21:21

## 2023-10-04 RX ADMIN — ALBUTEROL SULFATE 4 PUFF: 90 AEROSOL, METERED RESPIRATORY (INHALATION) at 19:39

## 2023-10-04 RX ADMIN — LACTULOSE 20 G: 20 SOLUTION ORAL at 08:37

## 2023-10-04 RX ADMIN — CLONAZEPAM 1 MG: 1 TABLET ORAL at 19:32

## 2023-10-04 RX ADMIN — ALBUTEROL SULFATE 4 PUFF: 90 AEROSOL, METERED RESPIRATORY (INHALATION) at 11:33

## 2023-10-04 RX ADMIN — POLYETHYLENE GLYCOL 3350 17 G: 17 POWDER, FOR SOLUTION ORAL at 08:25

## 2023-10-04 RX ADMIN — OXYCODONE HYDROCHLORIDE 15 MG: 5 TABLET ORAL at 19:32

## 2023-10-04 RX ADMIN — HYDROMORPHONE HYDROCHLORIDE 0.5 MG: 1 INJECTION, SOLUTION INTRAMUSCULAR; INTRAVENOUS; SUBCUTANEOUS at 09:44

## 2023-10-04 RX ADMIN — CLONAZEPAM 1 MG: 1 TABLET ORAL at 08:26

## 2023-10-04 RX ADMIN — FLUTICASONE FUROATE AND VILANTEROL TRIFENATATE 1 PUFF: 100; 25 POWDER RESPIRATORY (INHALATION) at 08:25

## 2023-10-04 RX ADMIN — RISPERIDONE 2 MG: 2 TABLET ORAL at 08:26

## 2023-10-04 RX ADMIN — RISPERIDONE 1 MG: 1 TABLET ORAL at 20:31

## 2023-10-04 RX ADMIN — PAROXETINE 40 MG: 40 TABLET, FILM COATED ORAL at 08:26

## 2023-10-04 RX ADMIN — METHOCARBAMOL 750 MG: 750 TABLET, FILM COATED ORAL at 21:31

## 2023-10-04 RX ADMIN — AMIODARONE HYDROCHLORIDE 400 MG: 200 TABLET ORAL at 13:03

## 2023-10-04 RX ADMIN — METHOCARBAMOL 750 MG: 750 TABLET, FILM COATED ORAL at 15:26

## 2023-10-04 RX ADMIN — FUROSEMIDE 20 MG: 20 TABLET ORAL at 11:33

## 2023-10-04 RX ADMIN — SENNOSIDES AND DOCUSATE SODIUM 2 TABLET: 50; 8.6 TABLET ORAL at 19:31

## 2023-10-04 RX ADMIN — OXYCODONE HYDROCHLORIDE 15 MG: 5 TABLET ORAL at 03:34

## 2023-10-04 RX ADMIN — OXYCODONE HYDROCHLORIDE 15 MG: 5 TABLET ORAL at 08:26

## 2023-10-04 RX ADMIN — OXYCODONE HYDROCHLORIDE 15 MG: 5 TABLET ORAL at 11:33

## 2023-10-04 RX ADMIN — MAGNESIUM HYDROXIDE 30 ML: 400 SUSPENSION ORAL at 08:27

## 2023-10-04 RX ADMIN — HYDROMORPHONE HYDROCHLORIDE 0.5 MG: 1 INJECTION, SOLUTION INTRAMUSCULAR; INTRAVENOUS; SUBCUTANEOUS at 03:00

## 2023-10-04 RX ADMIN — NALOXEGOL OXALATE 25 MG: 25 TABLET, FILM COATED ORAL at 08:27

## 2023-10-04 RX ADMIN — ACETAMINOPHEN 975 MG: 325 TABLET, FILM COATED ORAL at 03:00

## 2023-10-04 RX ADMIN — OXYCODONE HYDROCHLORIDE 15 MG: 5 TABLET ORAL at 16:20

## 2023-10-04 RX ADMIN — ALBUTEROL SULFATE 4 PUFF: 90 AEROSOL, METERED RESPIRATORY (INHALATION) at 08:25

## 2023-10-04 RX ADMIN — ALBUTEROL SULFATE 4 PUFF: 90 AEROSOL, METERED RESPIRATORY (INHALATION) at 15:26

## 2023-10-04 RX ADMIN — HYDROMORPHONE HYDROCHLORIDE 0.5 MG: 1 INJECTION, SOLUTION INTRAMUSCULAR; INTRAVENOUS; SUBCUTANEOUS at 13:03

## 2023-10-04 RX ADMIN — SENNOSIDES AND DOCUSATE SODIUM 2 TABLET: 50; 8.6 TABLET ORAL at 08:27

## 2023-10-04 RX ADMIN — OXYCODONE HYDROCHLORIDE 15 MG: 5 TABLET ORAL at 00:21

## 2023-10-04 RX ADMIN — METHOCARBAMOL 750 MG: 750 TABLET, FILM COATED ORAL at 08:26

## 2023-10-04 RX ADMIN — HYDROMORPHONE HYDROCHLORIDE 0.5 MG: 1 INJECTION, SOLUTION INTRAMUSCULAR; INTRAVENOUS; SUBCUTANEOUS at 17:58

## 2023-10-04 RX ADMIN — ENOXAPARIN SODIUM 40 MG: 40 INJECTION SUBCUTANEOUS at 15:26

## 2023-10-04 RX ADMIN — ACETAMINOPHEN 975 MG: 325 TABLET, FILM COATED ORAL at 17:58

## 2023-10-04 RX ADMIN — ACETAMINOPHEN 975 MG: 325 TABLET, FILM COATED ORAL at 09:44

## 2023-10-04 RX ADMIN — HYDROMORPHONE HYDROCHLORIDE 0.5 MG: 1 INJECTION, SOLUTION INTRAMUSCULAR; INTRAVENOUS; SUBCUTANEOUS at 15:26

## 2023-10-04 RX ADMIN — AMIODARONE HYDROCHLORIDE 400 MG: 200 TABLET ORAL at 19:31

## 2023-10-04 ASSESSMENT — ACTIVITIES OF DAILY LIVING (ADL)
ADLS_ACUITY_SCORE: 28

## 2023-10-04 NOTE — PROGRESS NOTES
Physical Therapy: Orders received. Chart reviewed and discussed with care team.? Physical Therapy not indicated due to pt moving fairly well, will continue to work with OT to address skilled therapy needs during admission.? Defer discharge recommendations to OT.? Will complete orders.

## 2023-10-04 NOTE — PROGRESS NOTES
Writer spoke with Jarred with thoracic surgery to notify him that Amiodarone drip was stopped due to infiltration, and hgb of 7.7. Jarred says it was ok and that he was going to start patient on oral amiodarone.

## 2023-10-04 NOTE — PLAN OF CARE
Pt admitted 10/2 for a L upper lung lobe wedge resection, segmentectomy, thoracotomy, broncoscopy, and mediastinal lymphadenectomy r/t a concerning nodule in the L upper lung. PMH includes HTN, HLD, COPD, sleep apnea, anemia, fibromyalgia, chronic neck and back pain, GERD, anxiety and depression.    Neuro: A&O x 4. Slow, logical speech. Afebrile. Pleasant affect. Calls appropriately.   Cardiac: SR 80s to 90s. SBP 110s to 120s. Denies dizziness, chest pain, or palpitations.   Respiratory: Sating well on RA. LS clear.   GI/: Good UOP via urinal. LBM 10/1. Audible bowel sounds, passing gas. Denies nausea.   Diet/Appetite: Regular   Skin: Reddened sacrum - covered with mepilex. L thoracotomy site - covered with gauze.   LDA:   - R PIV: infusing Amio @ 0.5mg/min (16.67mL/hr)  - L PIV: SL  - L pleural CT to water seal (210mL total output), covered with gauze.   Activity: Assist x 1 with GB  Pain: 8/10 incisional pain - PRN Oxycodone and Dilaudid given with moderate relief.     Plan: Continue to monitor and alert team (Thoracic) with any changes or concerns.

## 2023-10-04 NOTE — PROGRESS NOTES
"Pain Service Progress Note  [facility]  Date: 10/04/2023       Patient Name: Alex Frost  MRN: [id number]  Age: 62 year old  Sex: male      Assessment:  Alex Frost is a 62 year old male who has PMH of sleep apnea, chronic low back pain on chronic opioid pain management at [facility] pain clinic, GERD, left lung nodule now s/p left thoracoscopic upper lobe wedge resection, segmentectomy, left thoracotomy on 10/2/23.      Pain team has been following to assist with pain after thoracotomy in patient with chronic pain.  PTA, he was taking MS Contin 30mg once at 8 pm and then oxycodone 15mg every 2.5 hours x5 during the day as prescribed by [facility] clinic.  (Please note that MS contin is written as 15mg BID-Alex states that pain clinic is aware that he is taking it differently than prescribed.).    Alex is seen sitting in bed, eating breakfast.  NAD.  He states pain is located on the left lower rib cage at the site of chest tube ( different than yesterday's pain area which was in the upper L chest). Rates pain level 6/10 which is slightly lower than yesterday.  Has been walking in hallways with PT.  He feels the pain regimen is providing relief and does not wish to make changes.  Discussed that pain will improve when chest tube is removed.  Goal is to taper off IV Dilaudid use in the next few days.  No BM yet and this is being monitored.    Plan/Recommendations:    Continue MS Contin 30mg at bedtime ( PTA, takes at 8pm).  Continue oxycodone 15mg PO Q 3 hours PRN.                Continue  Dilaudid 0.3-0.5mg IV Q 2 hours PRN.  Acetaminophen 975mg PO Q 8 hours.  Aggressive Bowel regimen. States last BM was \"several days ago.\"   PTA, has chronic constipation.     Pain Service will continue to follow.     Discussed with attending anesthesiologist    Nereida Fairbanks PA-C  10/04/2023         Diet: Advance Diet as Tolerated: Regular Diet Adult    Relevant Labs:  Recent Labs   Lab Test " "10/04/23  0523 01/10/18  1253 01/05/18  1355   INR  --   --  1.02      < > 228   PTT  --   --  28   BUN 6.2*   < > 10    < > = values in this interval not displayed.       Physical Exam:  Vitals: /60 (BP Location: Left arm)   Pulse 84   Temp 97.7  F (36.5  C) (Oral)   Resp 13   Ht 1.803 m (5' 11\")   Wt 107.7 kg (237 lb 8 oz)   SpO2 94%   BMI 33.12 kg/m      Physical Exam:   CONSTITUTIONAL/GENERAL APPEARANCE: Conversant.  EYES: EOMI, sclerae clear  ENT/NECK: neck is supple  RESPIRATORY:non labored breathing, on room air  CARDIOVASCULAR: HR within normal limits  GI: round, distended, nontender  MUSCULOSKELETAL/BACK/SPINE/EXTREMITIES: Moving UE and LE independently.     NEURO:  AAOx3.   SKIN/VASCULAR EXAM:  Dry and warm.  PSYCHIATRIC/BEHAVIORAL/OBSERVATIONS:  No objective signs of pain observed during our interview.   Judgment/Insight -fair   Orientation - x3   Memory -fair   Mood and affect - calm, pleasant, cooperative         Relevant Medications:  Current Pain Medications:  Medications related to Pain Management (From now, onward)      Start     Dose/Rate Route Frequency Ordered Stop    10/05/23 0000  acetaminophen (TYLENOL) tablet 650 mg         650 mg Oral EVERY 4 HOURS PRN 10/02/23 1746      10/03/23 1500  oxyCODONE (ROXICODONE) tablet 15 mg         15 mg Oral EVERY 3 HOURS PRN 10/03/23 1427      10/03/23 1500  magnesium hydroxide (MILK OF MAGNESIA) suspension 30 mL         30 mL Oral DAILY 10/03/23 1427      10/03/23 1426  HYDROmorphone (PF) (DILAUDID) injection 0.3-0.5 mg         0.3-0.5 mg Intravenous EVERY 2 HOURS PRN 10/03/23 1427      10/03/23 0730  naloxegol tablet 25 mg         25 mg Oral EVERY MORNING BEFORE BREAKFAST 10/02/23 1534      10/02/23 2200  morphine (MS CONTIN) 12 hr tablet 30 mg        Note to Pharmacy: PTA Sig:Take 30 mg by mouth At Bedtime      30 mg Oral AT BEDTIME 10/02/23 1402      10/02/23 2000  clonazePAM (klonoPIN) tablet 1 mg        Note to Pharmacy: PTA Sig:Take " "2 mg (2 tablets) in the morning and take 1 mg in the afternoon.      1 mg Oral 2 TIMES DAILY 10/02/23 1402      10/02/23 2000  senna-docusate (SENOKOT-S/PERICOLACE) 8.6-50 MG per tablet 2 tablet        See [facility] for full Linked Orders Report.    2 tablet Oral 2 TIMES DAILY 10/02/23 1404      10/02/23 1800  acetaminophen (TYLENOL) tablet 975 mg         975 mg Oral EVERY 8 HOURS 10/02/23 1746 10/05/23 1759    10/02/23 1746  bisacodyl (DULCOLAX) suppository 10 mg         10 mg Rectal DAILY PRN 10/02/23 1746      10/02/23 1746  methocarbamol (ROBAXIN) tablet 750 mg         750 mg Oral EVERY 6 HOURS PRN 10/02/23 1746      10/02/23 1400  polyethylene glycol (MIRALAX) Packet 17 g         17 g Oral DAILY 10/02/23 1355      10/02/23 1357  lactulose (CHRONULAC) solution 20 g        Note to Pharmacy: PTA Sig:Take 20 g by mouth daily as needed      20 g Oral DAILY PRN 10/02/23 1402              Primary Service Contacted with Recommendations? Yes            Acute Inpatient Pain Service [facility]  Hours of pain coverage 24/7   Page via Amcom- Please Page the Pain Team Via Amcom: \"PAIN MANAGEMENT ACUTE INPATIENT/ [facility]\"            "

## 2023-10-04 NOTE — PROVIDER NOTIFICATION
CVTS cross cover contacted at 1945 about shutting the amio gtt off since it had been past the 18 hours indicated on the MAR. Dr. Gifford came to bedside and said to keep it on until tomorrow (10/4) when the day team rounds.

## 2023-10-04 NOTE — PROGRESS NOTES
THORACIC & FOREGUT SURGERY    S:  Did well overnight.  Pt seen at bedside resting comfortably.       O:  Vitals:    10/04/23 0528 10/04/23 0535 10/04/23 0810 10/04/23 0848   BP: 111/60   (!) 128/90   BP Location: Left arm   Left arm   Cuff Size:    Adult Large   Pulse: 84   87   Resp: 13   15   Temp: 98.2  F (36.8  C)  97.7  F (36.5  C)    TempSrc: Oral  Oral    SpO2: 94%   98%   Weight:  107.7 kg (237 lb 8 oz)     Height:           A&O, NAD  Breathing non-labored  RRR per tele  Soft, NDNT  Distal extremities warm    CT output thin serosang - 800ml, no air leak    Speech mildly slurred but improved during time of exam this AM, neuro exam intact, pt reports dry mouth and says this is causing his speech change    A/P: Alex Frost is a 62 year old male POD#2 s/p LEFT VATS (CONVERTED TO OPEN) SEGMENTECTOMY, LEFT THORACOTOMY   -Continue chest tube today given high fluid output, to water seal  -Likely chest tube out in 1-2 days  -Lasix this AM  -Possible dispo in 1-3 days, once tube removed and pain controlled  -Lovenox    POSTOP COMPLICATIONS: None     Spencer Chidlers PA-C

## 2023-10-05 ENCOUNTER — APPOINTMENT (OUTPATIENT)
Dept: OCCUPATIONAL THERAPY | Facility: CLINIC | Age: 63
End: 2023-10-05
Attending: THORACIC SURGERY (CARDIOTHORACIC VASCULAR SURGERY)
Payer: COMMERCIAL

## 2023-10-05 ENCOUNTER — APPOINTMENT (OUTPATIENT)
Dept: GENERAL RADIOLOGY | Facility: CLINIC | Age: 63
End: 2023-10-05
Attending: THORACIC SURGERY (CARDIOTHORACIC VASCULAR SURGERY)
Payer: COMMERCIAL

## 2023-10-05 LAB
ALBUMIN SERPL BCG-MCNC: 3.2 G/DL (ref 3.5–5.2)
ALP SERPL-CCNC: 47 U/L (ref 40–129)
ALT SERPL W P-5'-P-CCNC: 24 U/L (ref 0–70)
ANION GAP SERPL CALCULATED.3IONS-SCNC: 7 MMOL/L (ref 7–15)
AST SERPL W P-5'-P-CCNC: 58 U/L (ref 0–45)
BILIRUB SERPL-MCNC: 0.4 MG/DL
BUN SERPL-MCNC: 5.5 MG/DL (ref 8–23)
CALCIUM SERPL-MCNC: 8.5 MG/DL (ref 8.8–10.2)
CHLORIDE SERPL-SCNC: 97 MMOL/L (ref 98–107)
CREAT SERPL-MCNC: 0.83 MG/DL (ref 0.67–1.17)
DEPRECATED HCO3 PLAS-SCNC: 29 MMOL/L (ref 22–29)
EGFRCR SERPLBLD CKD-EPI 2021: >90 ML/MIN/1.73M2
ERYTHROCYTE [DISTWIDTH] IN BLOOD BY AUTOMATED COUNT: 12.4 % (ref 10–15)
GLUCOSE SERPL-MCNC: 123 MG/DL (ref 70–99)
HCT VFR BLD AUTO: 23.2 % (ref 40–53)
HGB BLD-MCNC: 7.8 G/DL (ref 13.3–17.7)
INTERPRETATION: NORMAL
LAB DIRECTOR COMMENTS: NORMAL
LAB DIRECTOR DISCLAIMER: NORMAL
LAB DIRECTOR INTERPRETATION: NORMAL
LAB DIRECTOR METHODOLOGY: NORMAL
LAB DIRECTOR RESULTS: NORMAL
MAGNESIUM SERPL-MCNC: 2.2 MG/DL (ref 1.7–2.3)
MCH RBC QN AUTO: 30.8 PG (ref 26.5–33)
MCHC RBC AUTO-ENTMCNC: 33.6 G/DL (ref 31.5–36.5)
MCV RBC AUTO: 92 FL (ref 78–100)
PLATELET # BLD AUTO: 145 10E3/UL (ref 150–450)
POTASSIUM SERPL-SCNC: 4.2 MMOL/L (ref 3.4–5.3)
PROT SERPL-MCNC: 5.7 G/DL (ref 6.4–8.3)
RBC # BLD AUTO: 2.53 10E6/UL (ref 4.4–5.9)
SIGNIFICANT RESULTS: NORMAL
SODIUM SERPL-SCNC: 133 MMOL/L (ref 135–145)
SPECIMEN DESCRIPTION: NORMAL
SPECIMEN DESCRIPTION: NORMAL
TEST DETAILS, MDL: NORMAL
WBC # BLD AUTO: 9.2 10E3/UL (ref 4–11)

## 2023-10-05 PROCEDURE — 250N000013 HC RX MED GY IP 250 OP 250 PS 637: Performed by: STUDENT IN AN ORGANIZED HEALTH CARE EDUCATION/TRAINING PROGRAM

## 2023-10-05 PROCEDURE — 83735 ASSAY OF MAGNESIUM: CPT | Performed by: THORACIC SURGERY (CARDIOTHORACIC VASCULAR SURGERY)

## 2023-10-05 PROCEDURE — 36415 COLL VENOUS BLD VENIPUNCTURE: CPT | Performed by: STUDENT IN AN ORGANIZED HEALTH CARE EDUCATION/TRAINING PROGRAM

## 2023-10-05 PROCEDURE — 97535 SELF CARE MNGMENT TRAINING: CPT | Mod: GO

## 2023-10-05 PROCEDURE — 71045 X-RAY EXAM CHEST 1 VIEW: CPT

## 2023-10-05 PROCEDURE — 250N000011 HC RX IP 250 OP 636: Mod: JZ | Performed by: PHYSICIAN ASSISTANT

## 2023-10-05 PROCEDURE — 250N000013 HC RX MED GY IP 250 OP 250 PS 637

## 2023-10-05 PROCEDURE — 85027 COMPLETE CBC AUTOMATED: CPT | Performed by: STUDENT IN AN ORGANIZED HEALTH CARE EDUCATION/TRAINING PROGRAM

## 2023-10-05 PROCEDURE — 71045 X-RAY EXAM CHEST 1 VIEW: CPT | Mod: 26 | Performed by: STUDENT IN AN ORGANIZED HEALTH CARE EDUCATION/TRAINING PROGRAM

## 2023-10-05 PROCEDURE — 99232 SBSQ HOSP IP/OBS MODERATE 35: CPT | Performed by: PHYSICIAN ASSISTANT

## 2023-10-05 PROCEDURE — 250N000013 HC RX MED GY IP 250 OP 250 PS 637: Performed by: PHYSICIAN ASSISTANT

## 2023-10-05 PROCEDURE — 97110 THERAPEUTIC EXERCISES: CPT | Mod: GO

## 2023-10-05 PROCEDURE — 250N000013 HC RX MED GY IP 250 OP 250 PS 637: Performed by: THORACIC SURGERY (CARDIOTHORACIC VASCULAR SURGERY)

## 2023-10-05 PROCEDURE — 80053 COMPREHEN METABOLIC PANEL: CPT | Performed by: STUDENT IN AN ORGANIZED HEALTH CARE EDUCATION/TRAINING PROGRAM

## 2023-10-05 PROCEDURE — 120N000003 HC R&B IMCU UMMC

## 2023-10-05 RX ORDER — CALCIUM CARBONATE 500 MG/1
500 TABLET, CHEWABLE ORAL
Status: COMPLETED | OUTPATIENT
Start: 2023-10-05 | End: 2023-10-05

## 2023-10-05 RX ORDER — ATORVASTATIN CALCIUM 20 MG/1
20 TABLET, FILM COATED ORAL EVERY EVENING
Status: DISCONTINUED | OUTPATIENT
Start: 2023-10-05 | End: 2023-10-07 | Stop reason: HOSPADM

## 2023-10-05 RX ADMIN — AMIODARONE HYDROCHLORIDE 400 MG: 200 TABLET ORAL at 20:05

## 2023-10-05 RX ADMIN — OXYCODONE HYDROCHLORIDE 15 MG: 5 TABLET ORAL at 14:19

## 2023-10-05 RX ADMIN — MORPHINE SULFATE 30 MG: 15 TABLET, EXTENDED RELEASE ORAL at 21:27

## 2023-10-05 RX ADMIN — METHOCARBAMOL 750 MG: 750 TABLET, FILM COATED ORAL at 08:15

## 2023-10-05 RX ADMIN — PAROXETINE 40 MG: 40 TABLET, FILM COATED ORAL at 08:19

## 2023-10-05 RX ADMIN — CLONAZEPAM 1 MG: 1 TABLET ORAL at 20:05

## 2023-10-05 RX ADMIN — CLONAZEPAM 1 MG: 1 TABLET ORAL at 08:15

## 2023-10-05 RX ADMIN — SENNOSIDES AND DOCUSATE SODIUM 2 TABLET: 50; 8.6 TABLET ORAL at 08:16

## 2023-10-05 RX ADMIN — ACETAMINOPHEN 975 MG: 325 TABLET, FILM COATED ORAL at 03:10

## 2023-10-05 RX ADMIN — HYDROMORPHONE HYDROCHLORIDE 0.5 MG: 1 INJECTION, SOLUTION INTRAMUSCULAR; INTRAVENOUS; SUBCUTANEOUS at 11:50

## 2023-10-05 RX ADMIN — MAGNESIUM HYDROXIDE 30 ML: 400 SUSPENSION ORAL at 08:20

## 2023-10-05 RX ADMIN — OXYCODONE HYDROCHLORIDE 15 MG: 5 TABLET ORAL at 20:05

## 2023-10-05 RX ADMIN — RISPERIDONE 1 MG: 1 TABLET ORAL at 20:13

## 2023-10-05 RX ADMIN — METHOCARBAMOL 750 MG: 750 TABLET, FILM COATED ORAL at 15:18

## 2023-10-05 RX ADMIN — SENNOSIDES AND DOCUSATE SODIUM 2 TABLET: 50; 8.6 TABLET ORAL at 20:05

## 2023-10-05 RX ADMIN — OXYCODONE HYDROCHLORIDE 15 MG: 5 TABLET ORAL at 06:13

## 2023-10-05 RX ADMIN — CALCIUM CARBONATE (ANTACID) CHEW TAB 500 MG 500 MG: 500 CHEW TAB at 21:28

## 2023-10-05 RX ADMIN — UMECLIDINIUM 1 PUFF: 62.5 AEROSOL, POWDER ORAL at 08:21

## 2023-10-05 RX ADMIN — ACETAMINOPHEN 975 MG: 325 TABLET, FILM COATED ORAL at 09:39

## 2023-10-05 RX ADMIN — ACETAMINOPHEN 650 MG: 325 TABLET, FILM COATED ORAL at 20:05

## 2023-10-05 RX ADMIN — FUROSEMIDE 20 MG: 20 TABLET ORAL at 08:16

## 2023-10-05 RX ADMIN — ENOXAPARIN SODIUM 40 MG: 40 INJECTION SUBCUTANEOUS at 15:18

## 2023-10-05 RX ADMIN — OXYCODONE HYDROCHLORIDE 15 MG: 5 TABLET ORAL at 17:24

## 2023-10-05 RX ADMIN — ALBUTEROL SULFATE 4 PUFF: 90 AEROSOL, METERED RESPIRATORY (INHALATION) at 11:54

## 2023-10-05 RX ADMIN — NALOXEGOL OXALATE 25 MG: 25 TABLET, FILM COATED ORAL at 08:18

## 2023-10-05 RX ADMIN — METHOCARBAMOL 750 MG: 750 TABLET, FILM COATED ORAL at 20:42

## 2023-10-05 RX ADMIN — AMIODARONE HYDROCHLORIDE 400 MG: 200 TABLET ORAL at 08:17

## 2023-10-05 RX ADMIN — POLYETHYLENE GLYCOL 3350 17 G: 17 POWDER, FOR SOLUTION ORAL at 08:19

## 2023-10-05 RX ADMIN — FLUTICASONE FUROATE AND VILANTEROL TRIFENATATE 1 PUFF: 100; 25 POWDER RESPIRATORY (INHALATION) at 08:20

## 2023-10-05 RX ADMIN — OXYCODONE HYDROCHLORIDE 15 MG: 5 TABLET ORAL at 00:37

## 2023-10-05 RX ADMIN — ATORVASTATIN CALCIUM 20 MG: 20 TABLET, FILM COATED ORAL at 20:05

## 2023-10-05 RX ADMIN — ALBUTEROL SULFATE 4 PUFF: 90 AEROSOL, METERED RESPIRATORY (INHALATION) at 08:20

## 2023-10-05 RX ADMIN — ALBUTEROL SULFATE 4 PUFF: 90 AEROSOL, METERED RESPIRATORY (INHALATION) at 16:54

## 2023-10-05 RX ADMIN — RISPERIDONE 2 MG: 2 TABLET ORAL at 08:15

## 2023-10-05 RX ADMIN — OXYCODONE HYDROCHLORIDE 15 MG: 5 TABLET ORAL at 09:40

## 2023-10-05 ASSESSMENT — ACTIVITIES OF DAILY LIVING (ADL)
ADLS_ACUITY_SCORE: 28
ADLS_ACUITY_SCORE: 32
ADLS_ACUITY_SCORE: 28
ADLS_ACUITY_SCORE: 28

## 2023-10-05 NOTE — PROGRESS NOTES
Pt called writer into room. Patient requested dilaudid administration since it was not administered at the soonest available time of 1958. Provided patient education that mediation is PRN and not scheduled. Patient drowsy, and RR low/low normal. RR 14-17 when awake, 8-14 while sleeping. Patient states SUDARSHAN is worse when on his back (currently unable to lay on his side). Provided patient education on safe parameters for IV pain medication as patient was already placed on 1.5 LPM via NC for desaturation while sleeping into the high 80's. Pt requested to have methocarbamol since that was available at 2126 . Patient rating pain at 6/10 with spasms. Administered methocarbamol for spasms as patient grimacing with minimal movement. O2 remains > 92%, RR 14-18, Remains safe in room, Nursing will continue to monitor. See flow sheet/eMAR for reference.

## 2023-10-05 NOTE — PROGRESS NOTES
Pain Service Progress Note  [facility]  Date: 10/05/2023       Patient Name: Alex Frost  MRN: [id number]  Age: 62 year old  Sex: male      Assessment:  Alex Frost is a 62 year old male who has PMH of sleep apnea, chronic low back pain on chronic opioid pain management at [facility] pain clinic, GERD, left lung nodule now s/p left thoracoscopic upper lobe wedge resection, segmentectomy, left thoracotomy on 10/2/23.      Pain team has been following to assist with pain after thoracotomy in patient with chronic pain.  PTA, he was taking MS Contin 30mg once at 8 pm and then oxycodone 15mg every 2.5 hours x5 during the day as prescribed by [facility] Pain clinic.  (Please note that MS contin is written as 15mg BID-Alex states that pain clinic is aware that he is taking it differently than prescribed.).    Alex reports pain is getting better.  States pain is at the incision on left posterior rib cage.  Pain level is 5-6/10.  Has been participating with PT , walking in hallways.  Had 2 BMs since yesterday.  Abdomen is less distended today.  Recommend using less IV Dilaudid to minimize side effects which he is agreeable.      Plan/Recommendations:    Continue MS Contin 30mg at bedtime ( PTA, takes at 8pm).  Continue oxycodone 15mg PO Q 3 hours PRN.                Change Dilaudid 0.3-0.5mg IV Q  TID PRN. Please use oral oxycodone first. Reserve for activations.  (Used 4 doses yesterday)   Plan on discontinuing IV use in next 1-2 days.  Acetaminophen 975mg PO Q 8 hours.  Aggressive Bowel regimen. States last BM on 10/4/23 x 2. PTA, has chronic constipation.       Pain Service will continue to follow.    Discussed with attending anesthesiologist    Nereida Fairbanks PA-C  10/05/2023         Diet: Advance Diet as Tolerated: Regular Diet Adult    Relevant Labs:  Recent Labs   Lab Test 10/05/23  0722 01/10/18  1253 01/05/18  1355   INR  --   --  1.02   *   < > 228   PTT  --   --  28   BUN 5.5*  "  < > 10    < > = values in this interval not displayed.       Physical Exam:  Vitals: /84   Pulse 112   Temp 98.1  F (36.7  C) (Oral)   Resp 16   Ht 1.803 m (5' 11\")   Wt 107 kg (235 lb 14.3 oz)   SpO2 98%   BMI 32.90 kg/m      Physical Exam:   CONSTITUTIONAL/GENERAL APPEARANCE: Conversant. Sitting up in chair.  EYES: EOMI, sclerae clear  ENT/NECK: neck is supple  RESPIRATORY:non labored breathing, on room air  CARDIOVASCULAR: tachycardic  GI:soft, nontender,   MUSCULOSKELETAL/BACK/SPINE/EXTREMITIES: Moving UE and LE independently.     NEURO:  AAOx3.   SKIN/VASCULAR EXAM:  Dry and warm.  PSYCHIATRIC/BEHAVIORAL/OBSERVATIONS:  No objective signs of pain observed during our interview.   Judgment/Insight -fair   Orientation - x3   Memory -fair   Mood and affect - calm, pleasant, cooperative         Relevant Medications:  Current Pain Medications:  Medications related to Pain Management (From now, onward)      Start     Dose/Rate Route Frequency Ordered Stop    10/05/23 0000  acetaminophen (TYLENOL) tablet 650 mg         650 mg Oral EVERY 4 HOURS PRN 10/02/23 1746      10/03/23 1500  oxyCODONE (ROXICODONE) tablet 15 mg         15 mg Oral EVERY 3 HOURS PRN 10/03/23 1427      10/03/23 1500  magnesium hydroxide (MILK OF MAGNESIA) suspension 30 mL         30 mL Oral DAILY 10/03/23 1427      10/03/23 1426  HYDROmorphone (PF) (DILAUDID) injection 0.3-0.5 mg         0.3-0.5 mg Intravenous EVERY 2 HOURS PRN 10/03/23 1427      10/03/23 0730  naloxegol tablet 25 mg         25 mg Oral EVERY MORNING BEFORE BREAKFAST 10/02/23 1534      10/02/23 2200  morphine (MS CONTIN) 12 hr tablet 30 mg        Note to Pharmacy: PTA Sig:Take 30 mg by mouth At Bedtime      30 mg Oral AT BEDTIME 10/02/23 1402      10/02/23 2000  clonazePAM (klonoPIN) tablet 1 mg        Note to Pharmacy: PTA Sig:Take 2 mg (2 tablets) in the morning and take 1 mg in the afternoon.      1 mg Oral 2 TIMES DAILY 10/02/23 1402      10/02/23 2000  " "senna-docusate (SENOKOT-S/PERICOLACE) 8.6-50 MG per tablet 2 tablet        See [facility]erspace for full Linked Orders Report.    2 tablet Oral 2 TIMES DAILY 10/02/23 1404      10/02/23 1746  bisacodyl (DULCOLAX) suppository 10 mg         10 mg Rectal DAILY PRN 10/02/23 1746      10/02/23 1746  methocarbamol (ROBAXIN) tablet 750 mg         750 mg Oral EVERY 6 HOURS PRN 10/02/23 1746      10/02/23 1400  polyethylene glycol (MIRALAX) Packet 17 g         17 g Oral DAILY 10/02/23 1355      10/02/23 1357  lactulose (CHRONULAC) solution 20 g        Note to Pharmacy: PTA Sig:Take 20 g by mouth daily as needed      20 g Oral DAILY PRN 10/02/23 1402              Primary Service Contacted with Recommendations? Yes            Acute Inpatient Pain Service [facility]  Hours of pain coverage 24/7   Page via Amcom- Please Page the Pain Team Via Amcom: \"PAIN MANAGEMENT ACUTE INPATIENT/ [facility]\"            "

## 2023-10-05 NOTE — PLAN OF CARE
Neuro: A&Ox4. Calls appropriately  Cardiac: Afebrile, VSS. SR, denies chest  pain  Respiratory: RA, denies SOB  GI/: Voiding spontaneously. 3 BM this shift.  Diet/appetite: Tolerating regular diet. Denies nausea.  Activity: Up with assist of 1 and a gait belt   Pain: left thoracotomy and chest tube site. Chronic pain managed with prns  Skin: No new deficits noted.  Lines: Left PIV  Drains: left chest  tube  Replacements: Mg recheck tomorrow    Problem: Plan of Care - These are the overarching goals to be used throughout the patient stay.    Goal: Optimal Comfort and Wellbeing  Outcome: Progressing  Intervention: Monitor Pain and Promote Comfort  Recent Flowsheet Documentation  Taken 10/4/2023 1521 by Sean Elam RN  Pain Management Interventions: medication (see MAR)  Taken 10/4/2023 1139 by Sean Elam RN  Pain Management Interventions: medication (see MAR)  Taken 10/4/2023 0848 by Sean Elam RN  Pain Management Interventions: medication (see MAR)     Problem: Pain Acute  Goal: Optimal Pain Control and Function  Outcome: Progressing  Intervention: Develop Pain Management Plan  Recent Flowsheet Documentation  Taken 10/4/2023 1521 by Sean Elam RN  Pain Management Interventions: medication (see MAR)  Taken 10/4/2023 1139 by Sean Elam RN  Pain Management Interventions: medication (see MAR)  Taken 10/4/2023 0848 by Sean Elam RN  Pain Management Interventions: medication (see MAR)  Intervention: Prevent or Manage Pain  Recent Flowsheet Documentation  Taken 10/4/2023 0848 by Sean Elam RN  Medication Review/Management: medications reviewed     Problem: Pulmonary Impairment  Goal: Improved Activity Tolerance  Outcome: Progressing  Goal: Optimal Gas Exchange  Outcome: Progressing   Goal Outcome Evaluation:

## 2023-10-05 NOTE — PROGRESS NOTES
D: Pt is a 62 year old male who has PMH of sleep apnea, chronic low back pain on chronic opioid pain management at [facility] pain clinic, GERD, left lung nodule now s/p left thoracoscopic upper lobe wedge resection, segmentectomy, left thoracotomy on 10/2/23.     Neuro: A&Ox4.   Cardiac: SR. VSS. On Mg protocol @ [facility] with recheck in AM.   Respiratory: Sating high 90's on RA. Pt however uses 1-1.5L of O2 when sleeping r/t de-sating.  GI/: Adequate urine output. BM X1 this shift.  Diet/appetite: Tolerating regular diet. Eating well.  Activity:  Assist of 1 with a gait belt. Was up to chair as well.  Pain: Pt reported pain to incisional site, PRN Oxycodone, Dilaudid and Robaxin were utilized.   Skin: Incision site, cleansed and dressing changed.   LDA's: CT to left pleural on water seal and L PIV SL.    Plan: Continue with POC. Notify primary team with changes.

## 2023-10-05 NOTE — PROGRESS NOTES
"  Resident/Fellow Attestation   I, Jarred Paige MD, was present with the medical/MARIA ALEJANDRA student who participated in the service and in the documentation of the note.  I have verified the history and personally performed the physical exam and medical decision making.  I agree with the assessment and plan of care as documented in the note.        Jarred Paige MD  PGY3  Date of Service (when I saw the patient): 10/05/23    [facility]    Progress Note - Thoracic Surgery Service       Date of Admission:  10/2/2023    Assessment & Plan: Surgery   Alex Frost is a 62 year old male POD#3 s/p LEFT VATS SEGMENTECTOMY, LEFT THORACOTOMY complicated by a pulmonary artery bleed that required conversion to open. The patient has been progressing well and is able to ambulate a few times a day. Chest tube output has decreased to 170 in the last 24 hours.     -Continue chest tube today given slight worsening CXR, water seal  -Likely chest tube out in 1-2 days, may consider pulling chest tube back 1-2 cm tomorrow   -Lasix this AM  -Possible dispo in 1-3 days, once tube removed and pain controlled  -Lovenox       Diet: Advance Diet as Tolerated: Regular Diet Adult    DVT Prophylaxis: Enoxaparin (Lovenox) SQ  Tolliver Catheter: Not present  Lines: None     Drains: PRESENT         - 1 Chest Tube(s)   Cardiac Monitoring: None  Code Status: Full Code      Clinically Significant Risk Factors              # Hypoalbuminemia: Lowest albumin = 3.2 g/dL at 10/5/2023  7:22 AM, will monitor as appropriate     # Hypertension: Noted on problem list        # Obesity: Estimated body mass index is 32.9 kg/m  as calculated from the following:    Height as of this encounter: 1.803 m (5' 11\").    Weight as of this encounter: 107 kg (235 lb 14.3 oz)., PRESENT ON ADMISSION            Disposition Plan     Expected Discharge Date: 10/06/2023      Destination: home with family           The patient's care was " discussed with the  fellow who will discuss with the attending .    Abhishek HAINES [facility]  Non-urgent messages: Securely message with [employer] (more info)  Text page via "[employer] Paging/Directory     ______________________________________________________________________    Interval History   No acute events overnight, no fevers or chills, decrease in output from the chest tube    Physical Exam   Vital Signs: Temp: 98.1  F (36.7  C) Temp src: Oral BP: 120/84 Pulse: 112   Resp: 16 SpO2: 98 % O2 Device: Nasal cannula Oxygen Delivery: 1.5 LPM  Weight: 235 lbs 14.28 oz  Intake/Output Summary (Last 24 hours) at 10/5/2023 1453  Last data filed at 10/5/2023 1420  Gross per 24 hour   Intake 760 ml   Output 1660 ml   Net -900 ml     General: Resting in bed, no acute distress  HEENT: Atraumatic, normocephalic  Cardiovascular: No tachycardia, normotensive, pulses equal bilaterally  Pulmonary: No acute respiratory distress satting well, thoracotomy is healing well, no signs of infection   GI: Abdomen soft, nontender, nondistended  Extremities: Moving all extremities without difficulty  Neuro: No focal neurological deficits

## 2023-10-06 ENCOUNTER — APPOINTMENT (OUTPATIENT)
Dept: GENERAL RADIOLOGY | Facility: CLINIC | Age: 63
End: 2023-10-06
Attending: THORACIC SURGERY (CARDIOTHORACIC VASCULAR SURGERY)
Payer: COMMERCIAL

## 2023-10-06 ENCOUNTER — APPOINTMENT (OUTPATIENT)
Dept: ULTRASOUND IMAGING | Facility: CLINIC | Age: 63
End: 2023-10-06
Attending: THORACIC SURGERY (CARDIOTHORACIC VASCULAR SURGERY)
Payer: COMMERCIAL

## 2023-10-06 LAB
ALBUMIN SERPL BCG-MCNC: 3.2 G/DL (ref 3.5–5.2)
ALP SERPL-CCNC: 69 U/L (ref 40–129)
ALT SERPL W P-5'-P-CCNC: 25 U/L (ref 0–70)
ANION GAP SERPL CALCULATED.3IONS-SCNC: 7 MMOL/L (ref 7–15)
AST SERPL W P-5'-P-CCNC: 45 U/L (ref 0–45)
BILIRUB SERPL-MCNC: 0.3 MG/DL
BUN SERPL-MCNC: 7.9 MG/DL (ref 8–23)
CALCIUM SERPL-MCNC: 8.2 MG/DL (ref 8.8–10.2)
CHLORIDE SERPL-SCNC: 99 MMOL/L (ref 98–107)
CREAT SERPL-MCNC: 0.86 MG/DL (ref 0.67–1.17)
DEPRECATED HCO3 PLAS-SCNC: 31 MMOL/L (ref 22–29)
EGFRCR SERPLBLD CKD-EPI 2021: >90 ML/MIN/1.73M2
ERYTHROCYTE [DISTWIDTH] IN BLOOD BY AUTOMATED COUNT: 12.5 % (ref 10–15)
GLUCOSE SERPL-MCNC: 126 MG/DL (ref 70–99)
HCT VFR BLD AUTO: 22.4 % (ref 40–53)
HGB BLD-MCNC: 7.3 G/DL (ref 13.3–17.7)
MAGNESIUM SERPL-MCNC: 2.6 MG/DL (ref 1.7–2.3)
MCH RBC QN AUTO: 30.8 PG (ref 26.5–33)
MCHC RBC AUTO-ENTMCNC: 32.6 G/DL (ref 31.5–36.5)
MCV RBC AUTO: 95 FL (ref 78–100)
PLATELET # BLD AUTO: 189 10E3/UL (ref 150–450)
POTASSIUM SERPL-SCNC: 3.9 MMOL/L (ref 3.4–5.3)
PROT SERPL-MCNC: 5.9 G/DL (ref 6.4–8.3)
RBC # BLD AUTO: 2.37 10E6/UL (ref 4.4–5.9)
SODIUM SERPL-SCNC: 137 MMOL/L (ref 135–145)
WBC # BLD AUTO: 7.1 10E3/UL (ref 4–11)

## 2023-10-06 PROCEDURE — 250N000013 HC RX MED GY IP 250 OP 250 PS 637

## 2023-10-06 PROCEDURE — 250N000013 HC RX MED GY IP 250 OP 250 PS 637: Performed by: STUDENT IN AN ORGANIZED HEALTH CARE EDUCATION/TRAINING PROGRAM

## 2023-10-06 PROCEDURE — 71045 X-RAY EXAM CHEST 1 VIEW: CPT

## 2023-10-06 PROCEDURE — G0452 MOLECULAR PATHOLOGY INTERPR: HCPCS | Mod: 26 | Performed by: PATHOLOGY

## 2023-10-06 PROCEDURE — 83735 ASSAY OF MAGNESIUM: CPT | Performed by: THORACIC SURGERY (CARDIOTHORACIC VASCULAR SURGERY)

## 2023-10-06 PROCEDURE — 93971 EXTREMITY STUDY: CPT | Mod: RT

## 2023-10-06 PROCEDURE — 71045 X-RAY EXAM CHEST 1 VIEW: CPT | Mod: 26 | Performed by: RADIOLOGY

## 2023-10-06 PROCEDURE — 80053 COMPREHEN METABOLIC PANEL: CPT | Performed by: STUDENT IN AN ORGANIZED HEALTH CARE EDUCATION/TRAINING PROGRAM

## 2023-10-06 PROCEDURE — 99232 SBSQ HOSP IP/OBS MODERATE 35: CPT | Performed by: PHYSICIAN ASSISTANT

## 2023-10-06 PROCEDURE — 85027 COMPLETE CBC AUTOMATED: CPT | Performed by: STUDENT IN AN ORGANIZED HEALTH CARE EDUCATION/TRAINING PROGRAM

## 2023-10-06 PROCEDURE — 71045 X-RAY EXAM CHEST 1 VIEW: CPT | Mod: 77

## 2023-10-06 PROCEDURE — 93971 EXTREMITY STUDY: CPT | Mod: 26 | Performed by: RADIOLOGY

## 2023-10-06 PROCEDURE — 36415 COLL VENOUS BLD VENIPUNCTURE: CPT | Performed by: STUDENT IN AN ORGANIZED HEALTH CARE EDUCATION/TRAINING PROGRAM

## 2023-10-06 PROCEDURE — 250N000011 HC RX IP 250 OP 636: Mod: JZ | Performed by: PHYSICIAN ASSISTANT

## 2023-10-06 PROCEDURE — 81455 SO/HL 51/>GSAP DNA/DNA&RNA: CPT | Performed by: THORACIC SURGERY (CARDIOTHORACIC VASCULAR SURGERY)

## 2023-10-06 PROCEDURE — 82947 ASSAY GLUCOSE BLOOD QUANT: CPT | Performed by: STUDENT IN AN ORGANIZED HEALTH CARE EDUCATION/TRAINING PROGRAM

## 2023-10-06 PROCEDURE — 250N000013 HC RX MED GY IP 250 OP 250 PS 637: Performed by: THORACIC SURGERY (CARDIOTHORACIC VASCULAR SURGERY)

## 2023-10-06 PROCEDURE — 120N000003 HC R&B IMCU UMMC

## 2023-10-06 PROCEDURE — 250N000013 HC RX MED GY IP 250 OP 250 PS 637: Performed by: PHYSICIAN ASSISTANT

## 2023-10-06 RX ORDER — VALSARTAN 80 MG/1
80 TABLET ORAL ONCE
Status: COMPLETED | OUTPATIENT
Start: 2023-10-06 | End: 2023-10-06

## 2023-10-06 RX ORDER — VALSARTAN 160 MG/1
160 TABLET ORAL EVERY MORNING
Status: DISCONTINUED | OUTPATIENT
Start: 2023-10-07 | End: 2023-10-07 | Stop reason: HOSPADM

## 2023-10-06 RX ORDER — AMIODARONE HYDROCHLORIDE 400 MG/1
400 TABLET ORAL 2 TIMES DAILY
Status: CANCELLED | OUTPATIENT
Start: 2023-10-07

## 2023-10-06 RX ORDER — FAMOTIDINE 20 MG/1
20 TABLET, FILM COATED ORAL 2 TIMES DAILY
Status: DISCONTINUED | OUTPATIENT
Start: 2023-10-06 | End: 2023-10-07 | Stop reason: HOSPADM

## 2023-10-06 RX ORDER — ACETAMINOPHEN 325 MG/1
975 TABLET ORAL EVERY 8 HOURS
Refills: 0 | Status: CANCELLED | COMMUNITY
Start: 2023-10-06

## 2023-10-06 RX ORDER — ACETAMINOPHEN 325 MG/1
975 TABLET ORAL EVERY 8 HOURS
Status: DISCONTINUED | OUTPATIENT
Start: 2023-10-06 | End: 2023-10-07 | Stop reason: HOSPADM

## 2023-10-06 RX ADMIN — AMIODARONE HYDROCHLORIDE 400 MG: 200 TABLET ORAL at 08:02

## 2023-10-06 RX ADMIN — MAGNESIUM HYDROXIDE 30 ML: 400 SUSPENSION ORAL at 08:03

## 2023-10-06 RX ADMIN — OXYCODONE HYDROCHLORIDE 15 MG: 5 TABLET ORAL at 13:49

## 2023-10-06 RX ADMIN — RISPERIDONE 2 MG: 2 TABLET ORAL at 08:03

## 2023-10-06 RX ADMIN — NALOXEGOL OXALATE 25 MG: 25 TABLET, FILM COATED ORAL at 08:02

## 2023-10-06 RX ADMIN — ATORVASTATIN CALCIUM 20 MG: 20 TABLET, FILM COATED ORAL at 19:58

## 2023-10-06 RX ADMIN — ENOXAPARIN SODIUM 40 MG: 40 INJECTION SUBCUTANEOUS at 15:36

## 2023-10-06 RX ADMIN — CLONAZEPAM 1 MG: 1 TABLET ORAL at 08:02

## 2023-10-06 RX ADMIN — METHOCARBAMOL 750 MG: 750 TABLET, FILM COATED ORAL at 17:56

## 2023-10-06 RX ADMIN — ACETAMINOPHEN 650 MG: 325 TABLET, FILM COATED ORAL at 13:49

## 2023-10-06 RX ADMIN — FLUTICASONE FUROATE AND VILANTEROL TRIFENATATE 1 PUFF: 100; 25 POWDER RESPIRATORY (INHALATION) at 08:04

## 2023-10-06 RX ADMIN — UMECLIDINIUM 1 PUFF: 62.5 AEROSOL, POWDER ORAL at 08:04

## 2023-10-06 RX ADMIN — VALSARTAN 80 MG: 80 TABLET, FILM COATED ORAL at 20:49

## 2023-10-06 RX ADMIN — FUROSEMIDE 20 MG: 20 TABLET ORAL at 08:02

## 2023-10-06 RX ADMIN — OXYCODONE HYDROCHLORIDE 15 MG: 5 TABLET ORAL at 06:45

## 2023-10-06 RX ADMIN — METHOCARBAMOL 750 MG: 750 TABLET, FILM COATED ORAL at 10:18

## 2023-10-06 RX ADMIN — ALBUTEROL SULFATE 4 PUFF: 90 AEROSOL, METERED RESPIRATORY (INHALATION) at 17:02

## 2023-10-06 RX ADMIN — PAROXETINE 40 MG: 40 TABLET, FILM COATED ORAL at 08:03

## 2023-10-06 RX ADMIN — OXYCODONE HYDROCHLORIDE 15 MG: 5 TABLET ORAL at 10:18

## 2023-10-06 RX ADMIN — OXYCODONE HYDROCHLORIDE 15 MG: 5 TABLET ORAL at 03:12

## 2023-10-06 RX ADMIN — ACETAMINOPHEN 650 MG: 325 TABLET, FILM COATED ORAL at 22:14

## 2023-10-06 RX ADMIN — ALBUTEROL SULFATE 4 PUFF: 90 AEROSOL, METERED RESPIRATORY (INHALATION) at 12:47

## 2023-10-06 RX ADMIN — ACETAMINOPHEN 650 MG: 325 TABLET, FILM COATED ORAL at 00:04

## 2023-10-06 RX ADMIN — MORPHINE SULFATE 30 MG: 15 TABLET, EXTENDED RELEASE ORAL at 22:05

## 2023-10-06 RX ADMIN — ACETAMINOPHEN 650 MG: 325 TABLET, FILM COATED ORAL at 06:45

## 2023-10-06 RX ADMIN — OXYCODONE HYDROCHLORIDE 15 MG: 5 TABLET ORAL at 00:04

## 2023-10-06 RX ADMIN — ACETAMINOPHEN 650 MG: 325 TABLET, FILM COATED ORAL at 10:18

## 2023-10-06 RX ADMIN — OXYCODONE HYDROCHLORIDE 15 MG: 5 TABLET ORAL at 17:56

## 2023-10-06 RX ADMIN — ALBUTEROL SULFATE 4 PUFF: 90 AEROSOL, METERED RESPIRATORY (INHALATION) at 08:02

## 2023-10-06 RX ADMIN — RISPERIDONE 1 MG: 1 TABLET ORAL at 19:57

## 2023-10-06 RX ADMIN — AMIODARONE HYDROCHLORIDE 400 MG: 200 TABLET ORAL at 19:57

## 2023-10-06 RX ADMIN — POLYETHYLENE GLYCOL 3350 17 G: 17 POWDER, FOR SOLUTION ORAL at 08:02

## 2023-10-06 RX ADMIN — FAMOTIDINE 20 MG: 20 TABLET ORAL at 19:58

## 2023-10-06 RX ADMIN — CLONAZEPAM 1 MG: 1 TABLET ORAL at 19:57

## 2023-10-06 RX ADMIN — METHOCARBAMOL 750 MG: 750 TABLET, FILM COATED ORAL at 03:12

## 2023-10-06 RX ADMIN — ACETAMINOPHEN 650 MG: 325 TABLET, FILM COATED ORAL at 03:12

## 2023-10-06 ASSESSMENT — ACTIVITIES OF DAILY LIVING (ADL)
ADLS_ACUITY_SCORE: 32
ADLS_ACUITY_SCORE: 28
ADLS_ACUITY_SCORE: 30
ADLS_ACUITY_SCORE: 28
ADLS_ACUITY_SCORE: 32
ADLS_ACUITY_SCORE: 28
ADLS_ACUITY_SCORE: 28
ADLS_ACUITY_SCORE: 30
ADLS_ACUITY_SCORE: 28

## 2023-10-06 NOTE — PLAN OF CARE
D: Pt admitted s/p L Thorascopic upper lobe wedge resection d/t concerning nodule. Hx. SUDARSHAN, CKD, HLD, HTN, Chronic Back pain, COPD, Depression, Anxiety, former smoker, obesity.    I: Monitored vitals and assessed pt status.   Changed: CT removed. Upper extremity US showed Total occlusive R cephalic vein from AC to distal wrist  Running: PIV SL  PRN: Tylenol 650mg X3 (changed to scheduled), Oxy 15mg X3, Robaxin X2  Tele: SR/ST  O2: RA - 2L with activity  Mobility: SBA in hallway, Indp in room. Bed alarm at night    A: A0x4. VSS. Getting more hypertensive, valsartan started this evening. Afebrile. Urinating adequately via bedside urinal. Incontinent BM this afternoon prior to CT removal. Pt c/o 5-6/10 pain at incisional site even after removal of CT. Hopeful for improvement overnight.     P: Continue to monitor Pt status and report changes to Thoracic Surgery.    Temp:  [97.9  F (36.6  C)-98.8  F (37.1  C)] 98.3  F (36.8  C)  Pulse:  [] 89  Resp:  [7-18] 15  BP: (115-149)/(57-79) 149/79  Cuff Mean (mmHg):  [85] 85  SpO2:  [93 %-100 %] 96 %  Problem: Plan of Care - These are the overarching goals to be used throughout the patient stay.    Goal: Plan of Care Review  Description: The Plan of Care Review/Shift note should be completed every shift.  The Outcome Evaluation is a brief statement about your assessment that the patient is improving, declining, or no change.  This information will be displayed automatically on your shift note.  Outcome: Progressing     Problem: Plan of Care - These are the overarching goals to be used throughout the patient stay.    Goal: Optimal Comfort and Wellbeing  Outcome: Progressing  Intervention: Monitor Pain and Promote Comfort  Recent Flowsheet Documentation  Taken 10/6/2023 1126 by Sigrid Owens, RN  Pain Management Interventions: medication (see MAR)   Goal Outcome Evaluation:

## 2023-10-06 NOTE — PROGRESS NOTES
Procedure:    The patient was informed of the risk and benefits of the Chest tube removal and elected to proceed. The patient was explained the procedure and the chest tube dressing was removed. The chest tube was clamped X2 and the sutures were removed. The patient was instructed to hold their breath and the tube was removed and the site was covered with an occlusive dressing. The patient tolerated the procedure well and denied any chest pain or shortness of breath. Will follow Cxr in 1 h.    Abhishek Louis, MS4    Francine CORTEZ  PGY1 General Surgery  [facility]

## 2023-10-06 NOTE — PROVIDER NOTIFICATION
"Provider notified via amcom smartweb    \"6C 4664 KW pt experiencing acid reflux & he reports that he takes Pepcid twice daily at home to treat it. Can an order be placed for Pepcid? Kristy RICHARDSON [phone]\"  "

## 2023-10-06 NOTE — PROVIDER NOTIFICATION
"Provider notified via amcom smartweb    \"6C 6416 KW during assessment, R forearm & wrist swollen & red w/ warmth, area feels hard compared to L forearm. Can someone come and take a look? Kristy RICHARDSON [phone]\"  "

## 2023-10-06 NOTE — PLAN OF CARE
V/S: VSS, afebrile. -130s. HR 60-90s.  Neuro: Pt is A&O x4, no c/o headache & lightheadedness. No c/o numbness & tingling, calls appropriately.  Resp: 1L NC overnight. Sats > 95, c/o VALLECILLO. Lung sounds clear & diminished.  Cardiac: Tele SR. No c/o chest pain & palpitations.  GI/: No BG checks. 2g sodium-restricted diet, tolerating well. 2L FR. No c/o n/v/d. Voiding adequately via urinal. Last BM 10/5/2023.  Skin: Skin dry & pale. No new deficits noted.  Pain: C/o mild/moderate incisional pain near CT site, given x3 oxycodone, x3 tylenol, & x2 robaxin w/ moderate relief.  Activity: Assist x1 w/ gait belt & walker in  in room & in hallways.  Electrolytes: Awaiting lab results.  LDAs: L PIV SL @ WDL. L chest tube to 1 atrium to water seal w/ no output overnight. Dressing CDI.    Orders placed to have lung biopsy on sample from procedure, awaiting results. Plan is to discuss with team in am whether CT will be removed (tube has had no output).   Plan of care ongoing.  Patient currently resting in bed with call light in reach.     Goal Outcome Evaluation:    Overall Patient Progress: improvingOverall Patient Progress: improving    Outcome Evaluation: R forearm increasingly swollen, warm & red compared to left forearm; provider notified and came to unit to assess pt. Per provider, elevate & use ice packs (continue to monitor). No output in CT (to water seal) during shift.

## 2023-10-06 NOTE — PROGRESS NOTES
"Pain Service Progress Note  [facility]  Date: 10/06/2023       Patient Name: Alex Frost  MRN: [id number]  Age: 62 year old  Sex: male      Assessment:  Alex Frost is a 62 year old male who has PMH of sleep apnea, chronic low back pain on chronic opioid pain management at [facility] pain clinic, GERD, left lung nodule now s/p left thoracoscopic upper lobe wedge resection, segmentectomy, left thoracotomy on 10/2/23.      Pain team has been following to assist with pain after thoracotomy in patient with chronic pain.  PTA, he was taking MS Contin 30mg once at 8 pm and then oxycodone 15mg every 2.5 hours x5 during the day as prescribed by [facility] clinic.  (Please note that MS contin is written as 15mg BID-Alex states that pain clinic is aware that he is taking it differently than prescribed.).    Alex reports \"pain is better\" today.  Pain level is 3-4/10 on the left chest.  Has chest tube remaining as of early this morning but states plan is to have this removed today.  Is ready to stop the IV Dilaudid.  Discussed goal is to return oxycodone 15mg dose to baseline of 5 doses per day as healing takes place.  Yesterday, he used oxycodone 15mg x 7 doses.  Had BM yesterday.       Plan/Recommendations:  Continue PTA MS Contin 30mg at bedtime ( PTA, takes at 8pm).  Continue oxycodone 15mg PO Q 3 hours PRN.   To taper, change to Q 4 hours PRN x 1-2 days then return to baseline of oxycodone 15mg PO 5x/day.   -follow up at [facility] pain clinic for chronic opioid pain management                STOP Dilaudid 0.3-0.5mg IV Q  TID PRN. Please use oral oxycodone first. Reserve for activations.  (Used 4 doses yesterday)               Acetaminophen 975mg PO Q 8 hours.  Aggressive Bowel regimen. States last BM on 10/5/23. PTA, has chronic constipation.       Pain Service will sign off.    Discussed with attending anesthesiologist    Nereida Fairbanks PA-C  10/06/2023       Diet: Advance Diet as " "Tolerated: Regular Diet Adult    Relevant Labs:  Recent Labs   Lab Test 10/06/23  0635 01/10/18  1253 01/05/18  1355   INR  --   --  1.02      < > 228   PTT  --   --  28   BUN 7.9*   < > 10    < > = values in this interval not displayed.       Physical Exam:  Vitals: /67 (BP Location: Left arm, Cuff Size: Adult Regular)   Pulse 101   Temp 98.8  F (37.1  C) (Oral)   Resp 18   Ht 1.803 m (5' 11\")   Wt 107 kg (235 lb 14.3 oz)   SpO2 96%   BMI 32.90 kg/m      Physical Exam:   CONSTITUTIONAL/GENERAL APPEARANCE: Conversant.  EYES: EOMI, sclerae clear  ENT/NECK: neck is supple  RESPIRATORY:on nasal cannula  CARDIOVASCULAR: tachycardic  GI:soft, nontender,   MUSCULOSKELETAL/BACK/SPINE/EXTREMITIES: Moving UE and LE independently.     NEURO:  AAOx3.   SKIN/VASCULAR EXAM:  Dry and warm.  PSYCHIATRIC/BEHAVIORAL/OBSERVATIONS:  No objective signs of pain observed during our interview.   Judgment/Insight -fair   Orientation - x3   Memory -fair   Mood and affect - calm, pleasant, cooperative         Relevant Medications:  Current Pain Medications:  Medications related to Pain Management (From now, onward)      Start     Dose/Rate Route Frequency Ordered Stop    10/05/23 0000  acetaminophen (TYLENOL) tablet 650 mg         650 mg Oral EVERY 4 HOURS PRN 10/02/23 1746      10/03/23 1500  oxyCODONE (ROXICODONE) tablet 15 mg         15 mg Oral EVERY 3 HOURS PRN 10/03/23 1427      10/03/23 1500  magnesium hydroxide (MILK OF MAGNESIA) suspension 30 mL         30 mL Oral DAILY 10/03/23 1427      10/03/23 1426  HYDROmorphone (PF) (DILAUDID) injection 0.3-0.5 mg         0.3-0.5 mg Intravenous EVERY 2 HOURS PRN 10/03/23 1427      10/03/23 0730  naloxegol tablet 25 mg         25 mg Oral EVERY MORNING BEFORE BREAKFAST 10/02/23 1534      10/02/23 2200  morphine (MS CONTIN) 12 hr tablet 30 mg        Note to Pharmacy: PTA Sig:Take 30 mg by mouth At Bedtime      30 mg Oral AT BEDTIME 10/02/23 1402      10/02/23 2000  clonazePAM " "(klonoPIN) tablet 1 mg        Note to Pharmacy: PTA Sig:Take 2 mg (2 tablets) in the morning and take 1 mg in the afternoon.      1 mg Oral 2 TIMES DAILY 10/02/23 1402      10/02/23 2000  senna-docusate (SENOKOT-S/PERICOLACE) 8.6-50 MG per tablet 2 tablet        See Hyperspace for full Linked Orders Report.    2 tablet Oral 2 TIMES DAILY 10/02/23 1404      10/02/23 1746  bisacodyl (DULCOLAX) suppository 10 mg         10 mg Rectal DAILY PRN 10/02/23 1746      10/02/23 1746  methocarbamol (ROBAXIN) tablet 750 mg         750 mg Oral EVERY 6 HOURS PRN 10/02/23 1746      10/02/23 1400  polyethylene glycol (MIRALAX) Packet 17 g         17 g Oral DAILY 10/02/23 1355      10/02/23 1357  lactulose (CHRONULAC) solution 20 g        Note to Pharmacy: PTA Sig:Take 20 g by mouth daily as needed      20 g Oral DAILY PRN 10/02/23 1402              Primary Service Contacted with Recommendations? Yes            Acute Inpatient Pain Service [facility]  Hours of pain coverage 24/7   Page via Amcom- Please Page the Pain Team Via Amcom: \"PAIN MANAGEMENT ACUTE INPATIENT/ [facility]\"            "

## 2023-10-06 NOTE — PROGRESS NOTES
"  [facility]    Progress Note - Thoracic Surgery Service       Date of Admission:  10/2/2023    Assessment & Plan: Surgery   Alex Frost is a 62 year old male POD#3 s/p LEFT VATS SEGMENTECTOMY, LEFT THORACOTOMY complicated by a pulmonary artery bleed that required conversion to open. The patient has been progressing well and is able to ambulate a few times a day. Chest tube output has decreased to 170 in the last 24 hours.     -Likely chest tube out this pm    -Lasix this AM  -Possible dispo today vs tomorrow  -Lovenox       Diet: Advance Diet as Tolerated: Regular Diet Adult    DVT Prophylaxis: Enoxaparin (Lovenox) SQ  Tolliver Catheter: Not present  Lines: None     Drains: None     Cardiac Monitoring: None  Code Status: Full Code      Clinically Significant Risk Factors          # Hypocalcemia: Lowest Ca = 8.2 mg/dL in last 2 days, will monitor and replace as appropriate     # Hypoalbuminemia: Lowest albumin = 3.2 g/dL at 10/6/2023  6:35 AM, will monitor as appropriate       # Hypertension: Noted on problem list        # Obesity: Estimated body mass index is 32.9 kg/m  as calculated from the following:    Height as of this encounter: 1.803 m (5' 11\").    Weight as of this encounter: 107 kg (235 lb 14.3 oz).               Disposition Plan     Expected Discharge Date: 10/06/2023      Destination: home with family           The patient's care was discussed with the  fellow who will discuss with the attending .    GOPI Rodríguez [facility]  Non-urgent messages: Securely message with [employer] (more info)  Text page via "[employer] Paging/Directory     ______________________________________________________________________    Interval History   No acute events overnight, no fevers or chills, decrease in output from the chest tube    Physical Exam   Vital Signs: Temp: 98.8  F (37.1  C) Temp src: Oral BP: 136/67 Pulse: 101   Resp: " 18 SpO2: 96 % O2 Device: None (Room air) Oxygen Delivery: 1 LPM  Weight: 235 lbs 14.28 oz  Intake/Output Summary (Last 24 hours) at 10/5/2023 1453  Last data filed at 10/5/2023 1420  Gross per 24 hour   Intake 760 ml   Output 1660 ml   Net -900 ml     General: Resting in bed, no acute distress  HEENT: Atraumatic, normocephalic  Cardiovascular: No tachycardia, normotensive, pulses equal bilaterally  Pulmonary: No acute respiratory distress satting well, thoracotomy is healing well, no signs of infection   GI: Abdomen soft, nontender, nondistended  Extremities: Moving all extremities without difficulty  Neuro: No focal neurological deficits

## 2023-10-07 ENCOUNTER — APPOINTMENT (OUTPATIENT)
Dept: GENERAL RADIOLOGY | Facility: CLINIC | Age: 63
End: 2023-10-07
Attending: THORACIC SURGERY (CARDIOTHORACIC VASCULAR SURGERY)
Payer: COMMERCIAL

## 2023-10-07 VITALS
HEIGHT: 71 IN | TEMPERATURE: 98.7 F | HEART RATE: 86 BPM | BODY MASS INDEX: 32.75 KG/M2 | OXYGEN SATURATION: 94 % | WEIGHT: 233.9 LBS | SYSTOLIC BLOOD PRESSURE: 122 MMHG | DIASTOLIC BLOOD PRESSURE: 63 MMHG | RESPIRATION RATE: 20 BRPM

## 2023-10-07 LAB
ALBUMIN SERPL BCG-MCNC: 3.3 G/DL (ref 3.5–5.2)
ALP SERPL-CCNC: 64 U/L (ref 40–129)
ALT SERPL W P-5'-P-CCNC: 50 U/L (ref 0–70)
ANION GAP SERPL CALCULATED.3IONS-SCNC: 9 MMOL/L (ref 7–15)
AST SERPL W P-5'-P-CCNC: 55 U/L (ref 0–45)
BILIRUB SERPL-MCNC: 0.3 MG/DL
BUN SERPL-MCNC: 9.9 MG/DL (ref 8–23)
CALCIUM SERPL-MCNC: 8.4 MG/DL (ref 8.8–10.2)
CHLORIDE SERPL-SCNC: 98 MMOL/L (ref 98–107)
CREAT SERPL-MCNC: 0.82 MG/DL (ref 0.67–1.17)
DEPRECATED HCO3 PLAS-SCNC: 27 MMOL/L (ref 22–29)
EGFRCR SERPLBLD CKD-EPI 2021: >90 ML/MIN/1.73M2
ERYTHROCYTE [DISTWIDTH] IN BLOOD BY AUTOMATED COUNT: 12.7 % (ref 10–15)
GLUCOSE SERPL-MCNC: 119 MG/DL (ref 70–99)
HCT VFR BLD AUTO: 22.7 % (ref 40–53)
HGB BLD-MCNC: 7.5 G/DL (ref 13.3–17.7)
MAGNESIUM SERPL-MCNC: 2.4 MG/DL (ref 1.7–2.3)
MCH RBC QN AUTO: 30.1 PG (ref 26.5–33)
MCHC RBC AUTO-ENTMCNC: 33 G/DL (ref 31.5–36.5)
MCV RBC AUTO: 91 FL (ref 78–100)
PLATELET # BLD AUTO: 221 10E3/UL (ref 150–450)
POTASSIUM SERPL-SCNC: 3.9 MMOL/L (ref 3.4–5.3)
PROT SERPL-MCNC: 5.8 G/DL (ref 6.4–8.3)
RBC # BLD AUTO: 2.49 10E6/UL (ref 4.4–5.9)
SODIUM SERPL-SCNC: 134 MMOL/L (ref 135–145)
WBC # BLD AUTO: 8.2 10E3/UL (ref 4–11)

## 2023-10-07 PROCEDURE — 250N000013 HC RX MED GY IP 250 OP 250 PS 637: Performed by: PHYSICIAN ASSISTANT

## 2023-10-07 PROCEDURE — 71045 X-RAY EXAM CHEST 1 VIEW: CPT | Mod: 26 | Performed by: RADIOLOGY

## 2023-10-07 PROCEDURE — 80053 COMPREHEN METABOLIC PANEL: CPT | Performed by: STUDENT IN AN ORGANIZED HEALTH CARE EDUCATION/TRAINING PROGRAM

## 2023-10-07 PROCEDURE — 250N000013 HC RX MED GY IP 250 OP 250 PS 637: Performed by: STUDENT IN AN ORGANIZED HEALTH CARE EDUCATION/TRAINING PROGRAM

## 2023-10-07 PROCEDURE — 250N000013 HC RX MED GY IP 250 OP 250 PS 637

## 2023-10-07 PROCEDURE — 71045 X-RAY EXAM CHEST 1 VIEW: CPT

## 2023-10-07 PROCEDURE — 250N000013 HC RX MED GY IP 250 OP 250 PS 637: Performed by: THORACIC SURGERY (CARDIOTHORACIC VASCULAR SURGERY)

## 2023-10-07 PROCEDURE — 83735 ASSAY OF MAGNESIUM: CPT | Performed by: THORACIC SURGERY (CARDIOTHORACIC VASCULAR SURGERY)

## 2023-10-07 PROCEDURE — 250N000011 HC RX IP 250 OP 636: Mod: JZ | Performed by: PHYSICIAN ASSISTANT

## 2023-10-07 PROCEDURE — 36415 COLL VENOUS BLD VENIPUNCTURE: CPT | Performed by: STUDENT IN AN ORGANIZED HEALTH CARE EDUCATION/TRAINING PROGRAM

## 2023-10-07 PROCEDURE — 85027 COMPLETE CBC AUTOMATED: CPT | Performed by: STUDENT IN AN ORGANIZED HEALTH CARE EDUCATION/TRAINING PROGRAM

## 2023-10-07 RX ORDER — AMIODARONE HYDROCHLORIDE 200 MG/1
200 TABLET ORAL DAILY
Qty: 21 TABLET | Refills: 0 | Status: SHIPPED | OUTPATIENT
Start: 2023-10-10 | End: 2024-01-04

## 2023-10-07 RX ORDER — OXYCODONE HCL 5 MG/5 ML
15 SOLUTION, ORAL ORAL
Qty: 525 ML | Refills: 0 | Status: SHIPPED | OUTPATIENT
Start: 2023-10-07 | End: 2023-10-07

## 2023-10-07 RX ORDER — FUROSEMIDE 20 MG
20 TABLET ORAL DAILY
Qty: 2 TABLET | Refills: 0 | Status: SHIPPED | OUTPATIENT
Start: 2023-10-08 | End: 2024-01-04

## 2023-10-07 RX ORDER — ACETAMINOPHEN 325 MG/1
975 TABLET ORAL EVERY 8 HOURS
Refills: 0 | COMMUNITY
Start: 2023-10-07

## 2023-10-07 RX ORDER — AMIODARONE HYDROCHLORIDE 200 MG/1
200 TABLET ORAL 2 TIMES DAILY
Qty: 4 TABLET | Refills: 0 | Status: SHIPPED | OUTPATIENT
Start: 2023-10-08 | End: 2024-01-04

## 2023-10-07 RX ORDER — AMOXICILLIN 250 MG
2 CAPSULE ORAL 2 TIMES DAILY PRN
Qty: 14 TABLET | Refills: 0 | Status: SHIPPED | OUTPATIENT
Start: 2023-10-07 | End: 2023-10-14

## 2023-10-07 RX ORDER — OXYCODONE HYDROCHLORIDE 5 MG/1
5 TABLET ORAL EVERY 6 HOURS PRN
Qty: 20 TABLET | Refills: 0 | Status: SHIPPED | OUTPATIENT
Start: 2023-10-07 | End: 2023-10-10

## 2023-10-07 RX ORDER — METHOCARBAMOL 750 MG/1
750 TABLET, FILM COATED ORAL EVERY 6 HOURS PRN
Qty: 28 TABLET | Refills: 0 | Status: SHIPPED | OUTPATIENT
Start: 2023-10-07 | End: 2023-10-14

## 2023-10-07 RX ORDER — POLYETHYLENE GLYCOL 3350 17 G/17G
17 POWDER, FOR SOLUTION ORAL DAILY
Qty: 510 G | Refills: 0 | Status: SHIPPED | OUTPATIENT
Start: 2023-10-07 | End: 2023-11-02

## 2023-10-07 RX ORDER — OXYCODONE HYDROCHLORIDE 5 MG/1
10 TABLET ORAL
Qty: 20 TABLET | Refills: 0 | Status: SHIPPED | OUTPATIENT
Start: 2023-10-07 | End: 2023-10-14

## 2023-10-07 RX ORDER — AMIODARONE HYDROCHLORIDE 400 MG/1
400 TABLET ORAL AT BEDTIME
Qty: 1 TABLET | Refills: 0 | Status: SHIPPED | OUTPATIENT
Start: 2023-10-07 | End: 2023-11-02

## 2023-10-07 RX ORDER — ENOXAPARIN SODIUM 100 MG/ML
40 INJECTION SUBCUTANEOUS EVERY 24 HOURS
Qty: 0.8 ML | Refills: 0 | Status: SHIPPED | OUTPATIENT
Start: 2023-10-07 | End: 2023-10-09

## 2023-10-07 RX ADMIN — MAGNESIUM HYDROXIDE 30 ML: 400 SUSPENSION ORAL at 08:52

## 2023-10-07 RX ADMIN — FAMOTIDINE 20 MG: 20 TABLET ORAL at 08:59

## 2023-10-07 RX ADMIN — UMECLIDINIUM 1 PUFF: 62.5 AEROSOL, POWDER ORAL at 08:53

## 2023-10-07 RX ADMIN — VALSARTAN 160 MG: 160 TABLET, FILM COATED ORAL at 08:53

## 2023-10-07 RX ADMIN — NALOXEGOL OXALATE 25 MG: 25 TABLET, FILM COATED ORAL at 08:51

## 2023-10-07 RX ADMIN — FUROSEMIDE 20 MG: 20 TABLET ORAL at 08:52

## 2023-10-07 RX ADMIN — POLYETHYLENE GLYCOL 3350 17 G: 17 POWDER, FOR SOLUTION ORAL at 08:53

## 2023-10-07 RX ADMIN — ALBUTEROL SULFATE 4 PUFF: 90 AEROSOL, METERED RESPIRATORY (INHALATION) at 09:02

## 2023-10-07 RX ADMIN — CLONAZEPAM 1 MG: 1 TABLET ORAL at 08:52

## 2023-10-07 RX ADMIN — ACETAMINOPHEN 975 MG: 325 TABLET, FILM COATED ORAL at 07:06

## 2023-10-07 RX ADMIN — FLUTICASONE FUROATE AND VILANTEROL TRIFENATATE 1 PUFF: 100; 25 POWDER RESPIRATORY (INHALATION) at 08:53

## 2023-10-07 RX ADMIN — RISPERIDONE 2 MG: 2 TABLET ORAL at 08:52

## 2023-10-07 RX ADMIN — OXYCODONE HYDROCHLORIDE 15 MG: 5 TABLET ORAL at 08:52

## 2023-10-07 RX ADMIN — PAROXETINE 40 MG: 40 TABLET, FILM COATED ORAL at 08:53

## 2023-10-07 RX ADMIN — OXYCODONE HYDROCHLORIDE 15 MG: 5 TABLET ORAL at 12:45

## 2023-10-07 RX ADMIN — ENOXAPARIN SODIUM 40 MG: 40 INJECTION SUBCUTANEOUS at 15:11

## 2023-10-07 RX ADMIN — AMIODARONE HYDROCHLORIDE 400 MG: 200 TABLET ORAL at 08:52

## 2023-10-07 RX ADMIN — OXYCODONE HYDROCHLORIDE 15 MG: 5 TABLET ORAL at 03:12

## 2023-10-07 RX ADMIN — ALBUTEROL SULFATE 4 PUFF: 90 AEROSOL, METERED RESPIRATORY (INHALATION) at 12:06

## 2023-10-07 RX ADMIN — ACETAMINOPHEN 975 MG: 325 TABLET, FILM COATED ORAL at 14:27

## 2023-10-07 ASSESSMENT — ACTIVITIES OF DAILY LIVING (ADL)
ADLS_ACUITY_SCORE: 26
ADLS_ACUITY_SCORE: 30
ADLS_ACUITY_SCORE: 26
ADLS_ACUITY_SCORE: 30
ADLS_ACUITY_SCORE: 26
ADLS_ACUITY_SCORE: 30
ADLS_ACUITY_SCORE: 26
ADLS_ACUITY_SCORE: 30

## 2023-10-07 NOTE — PLAN OF CARE
"Goal Outcome Evaluation:      Plan of Care Reviewed With: patient    Overall Patient Progress: improvingOverall Patient Progress: improving      Shift:1630-6213    VS: /81 (Cuff Size: Adult Regular)   Pulse 83   Temp 98.4  F (36.9  C) (Oral)   Resp 15   Ht 1.803 m (5' 11\")   Wt 106.1 kg (233 lb 14.4 oz)   SpO2 97%   BMI 32.62 kg/m       Pain: Pt c/o CT site, give tylenol, PRN oxycodone with improvement.     Neuro: A&Ox4, Stand by assist    Cardiac: SR, HR 80-90s, SBP elevated at 150. Notified provider.      Respiratory: On 1.5 L of NC with O2 above 92%.   Diet/Appetite: Advance as tolerated, regular diet.   /GI: Voiding good. Pt report loose stool and refused bowel meds.  LDA's: Left forearm PIV SL. CT with excessive drainage, provider are aware, dressing reinforced.   Skin: Dry and intact.   Activity: Stand by assist   Pertinent labs: Electrolyte protocol     Plan: Possible discharge today.    "

## 2023-10-07 NOTE — PROGRESS NOTES
NURSING PROGRESS NOTE   Discharge Note      DISCHARGE DATE: 10/7/2023      Discharged to:  Home   Via:  Private car   Accompanied by:  Hospital staff  Discharge Instructions:  AVS was reviewed with and given to patient .  See AVS for specific instructions.  Prescriptions:  Filled at [facility] discharge pharmacy .  Medication list and changes reviewed and sent with patient.  Follow Up Appointments: Outlined in AVS, pt to schedule.  Belongings: Sent with patient.  IV:  Removed prior to discharge.  Telemetry:  Removed prior to discharge.    Patient recommended to see sleep doctor/sleep study for his apnea and O2sat dips down while sleeping. Team aware and suggested and outpatient follow up with sleep study doctor.     Pt exhibits understanding of above discharge instructions; questions answered.    Discharge Paperwork: AVS form signed, and AVS packet sent home with the patient.      Maryjane Hirsch RN .................................................... October 7, 2023   2:20 PM  [facility] ([facility]): [facility] ICU (Unit 6D)

## 2023-10-07 NOTE — PROGRESS NOTES
NURSING PROGRESS NOTE   Discharge Note      DISCHARGE DATE: 10/7/2023       Discharged to:  Home with wife and son.   Via:  Private car   Accompanied by:  Hospital staff  Discharge Instructions:  AVS was reviewed with and given to patient, wife and son, Questions were answered. Instructed on his surgical site, CT site dressing. See AVS for specific instructions.  Prescriptions:  Filled at [facility] discharge pharmacy. Patient informed that oxycodone is not sent with him since he still has at home and reminded to follow up with pain clinic.    Medication list and changes reviewed and sent with patient, wife and son.  Follow Up Appointments: Outlined in AVS, pt to schedule.  Belongings: Sent with patient.  IV:  Removed prior to discharge.  Telemetry:  Removed prior to discharge.    Pt exhibits understanding of above discharge instructions; questions answered.    Discharge Paperwork: AVS form signed, and AVS packet sent home with the patient.      Maryjane Hirsch RN .................................................... October 7, 2023   3:41 PM  [facility] ([facility]): [place]  [facility]Unit [facility]

## 2023-10-07 NOTE — PLAN OF CARE
Neuro: A&Ox4. Call light appropriate.   Cardiac: SR. HR 90s.   Respiratory: Sating high 90s on R.A.  Home oxygen eval test done: Passed. ( See Flowsheet).   GI/: Adequate urine output.   Diet/appetite: Refused breakfast.  Activity: SBA  Pain: At acceptable level on current regimen.   Skin: No new deficits noted.  LDA's: Right PIV saline locked.     [id number] Alex RAJAN. He passed the home oxygen eval but he dips down to 88% while sleeping. Can we do an outpatient sleep study request. Thanks-Vale. [phone]    Plan: Continue with POC. Notify primary team with changes.

## 2023-10-08 NOTE — PLAN OF CARE
Occupational Therapy Discharge Summary    Reason for therapy discharge:    Discharged to home.    Progress towards therapy goal(s). See goals on Care Plan in [facility] electronic health record for goal details.  Goals partially met.  Barriers to achieving goals:   discharge from facility.    Therapy recommendation(s):    Continued therapy is recommended.  Rationale/Recommendations:  OP OT recommended to further progress skills and overall activity tolerance. Pt not seen by discharging therapist.

## 2023-10-09 LAB
PATH REPORT.COMMENTS IMP SPEC: ABNORMAL
PATH REPORT.COMMENTS IMP SPEC: YES
PATH REPORT.FINAL DX SPEC: ABNORMAL
PATH REPORT.GROSS SPEC: ABNORMAL
PATH REPORT.INTRAOP OBS SPEC DOC: ABNORMAL
PATH REPORT.MICROSCOPIC SPEC OTHER STN: ABNORMAL
PATH REPORT.RELEVANT HX SPEC: ABNORMAL
PATHOLOGY SYNOPTIC REPORT: ABNORMAL
PHOTO IMAGE: ABNORMAL

## 2023-10-09 NOTE — DISCHARGE SUMMARY
NAME: Alex Frost   MRN: [id number]   : 1960     DATE OF ADMISSION: 10/2/2023     PRE/POSTOPERATIVE DIAGNOSES: Lung nodule    PROCEDURES PERFORMED: 10/2/2023    Flexible bronchoscopy  Uniportal thoracoscopic LEFT upper lobe segmentectomy (S1-3)   Uniportal thoracoscopic LEFT upper lobe segmentectomy (S1-3) converted to thoracotomy  Mediastinal lymphadenectomy   Left pleural chest tube insertion    PATHOLOGY RESULTS: Invasive adenocarcinoma of left upper lobe of lung, tumor size 2.1 cm, invasive component 1.3 cm pT1bN0. Patient will be monitored with serial imaging.    CULTURE RESULTS: None     INTRAOPERATIVE COMPLICATIONS: Thoracoscopy: difficult due to inflammation around broncho-vascular structures  Bleeding from the origin of A2, probably because a small hem-o-lock clip slipped partial of. Controlled with pressure, thoracotomy and proximal control of the LEFT main PA. No intraoperative hypotension from bleeding, no uncontrolled bleeding at any point.    POSTOPERATIVE COMPLICATIONS: None     DRAINS/TUBES PRESENT AT DISCHARGE: none    DATE OF DISCHARGE:  10/7/2023    HOSPITAL COURSE: Alex Frost is a 62 year old male who on 10/2/2023  underwent the above-named procedures.  He tolerated the operation well and postoperatively was transferred to an intermediate care unit for close monitoring.  The remainder of his course was essentially uncomplicated.  Chest tube was removed on POD4. Prior to discharge, his pain was controlled well, he was able to perform ADLs and ambulate independently without difficulty, and had full return of bowel and bladder function.  On 10/7, he was discharged to home in stable condition with no tubes or drains in place.    He had clinically insignificant hyponatremia and anemia secondary to blood loss that did not require a transfusion.    DISCHARGE EXAM:   A&O, NAD  Resp non-labored  Distal extremities warm    Incisions clean dry intact.    DISCHARGE INSTRUCTIONS:  Discharge  Procedure Orders   X-ray Chest 2 vws*   Standing Status: Future Standing Exp. Date: 01/05/24     Order Specific Question Answer Comments   Priority Routine      Occupational Therapy Referral   Standing Status: Future   Referral Priority: Routine Referral Type: Occupational Therapy   Number of Visits Requested: 1     Reason for your hospital stay   Order Comments: Left thoracoscopic converted to open upper lobe segmentectomy     Activity   Order Comments: If your plans upon discharge include prolonged periods of sitting (i.e a lengthy car or plane ride), it is highly beneficial to get up and walk at least once per hour to help prevent swelling and blood clots.     Activity as tolerated, no strenous activity until seen in follow-up, no lifting greater than 20 pounds for the next 6-8 weeks.     Order Specific Question Answer Comments   Is discharge order? Yes      Discharge Instructions   Order Comments: You may remove chest tube dressing 48 hours after tube removal and bandage the site at your own discretion thereafter.  Small amounts of leakage are normal for 2-3 days after removal.  Feel free to call with questions.    You may get incision wet 2 days after operation. Do not submerge, soak, or scrub incision or swim until seen in follow-up.    Take incentive spirometer home for continued frequent use    Stay hydrated. Take over the counter fiber (metamucil or benefiber) and stool softeners (Miralax, docusate or senna) if becoming constipated.     Call for fever greater than 101.5, chills, increased size of incision, red skin around incision, vision changes, muscle strength changes, sensation changes, shortness of breath, or other concerns.    No driving while taking narcotic pain medication.    Transition to ibuprofen or tylenol/acetaminophen for pain control. Do not take tylenol/acetaminophen and acetaminophen containing narcotic (e.g., percocet or vicodin) at the same time. If you have known ulcer problems, or  "kidney trouble (elevated creatinine) do not take the ibuprofen.    In emergencies, call [phone]    For other Questions or Concerns;   A.) During weekday working hours (Monday through Friday 8am to 4:30pm)   call [phone] and ask to speak to a thoracic surgery nurse (RN or LPN).     B.) At nights (after 4:30pm), on weekends, or if urgent call [phone] and   tell the  \"I would like to page job code [phone], the thoracic surgery   fellow on call, please.\"     Adult [facility] Follow-up and recommended labs and tests   Order Comments: 1.) Follow up with primary care physician, Manav Hernandez, in 1-2 weeks.  2.) Follow up with a thoracic surgery Clinical Nurse Specialist in Thoracic Surgery clinic in 1 month, prior to which a CXR should be performed.     Appointments on [facility] and/or [facility] (with [facility] or [facility] provider or service). Call [phone] if you haven't heard regarding these appointments within 7 days of discharge.     Diet   Order Comments: Follow this diet upon discharge: regular     Order Specific Question Answer Comments   Is discharge order? Yes        DISCHARGE MEDICATIONS:   Discharge Medication List as of 10/7/2023  1:38 PM        START taking these medications    Details   acetaminophen (TYLENOL) 325 MG tablet Take 3 tablets (975 mg) by mouth every 8 hours, R-0, OTC      !! amiodarone (PACERONE) 200 MG tablet 1 tablet (200 mg) by Oral or FT or NG tube route 2 times daily for 4 doses, Disp-4 tablet, R-0, E-Prescribe      !! amiodarone (PACERONE) 200 MG tablet 1 tablet (200 mg) by Oral or FT or NG tube route daily for 21 days, Disp-21 tablet, R-0, E-Prescribe      !! amiodarone (PACERONE) 400 MG tablet 1 tablet (400 mg) by Oral or FT or NG tube route at bedtime, Disp-1 tablet, R-0, E-Prescribe      enoxaparin ANTICOAGULANT (LOVENOX) 40 MG/0.4ML syringe Inject 0.4 mLs (40 mg) Subcutaneous every 24 hours for 2 days, Disp-0.8 mL, R-0, E-Prescribe      furosemide (LASIX) 20 " MG tablet Take 1 tablet (20 mg) by mouth daily for 2 days, Disp-2 tablet, R-0, E-Prescribe      methocarbamol (ROBAXIN) 750 MG tablet Take 1 tablet (750 mg) by mouth every 6 hours as needed for muscle spasms, Disp-28 tablet, R-0, E-Prescribe      polyethylene glycol (MIRALAX) 17 GM/Dose powder Take 17 g by mouth daily, Disp-510 g, R-0, E-Prescribe      senna-docusate (SENOKOT-S/PERICOLACE) 8.6-50 MG tablet Take 2 tablets by mouth 2 times daily as needed for constipation, Disp-14 tablet, R-0, E-Prescribe       !! - Potential duplicate medications found. Please discuss with provider.        CONTINUE these medications which have CHANGED    Details   !! oxyCODONE (ROXICODONE) 5 MG tablet Take 2 tablets (10 mg) by mouth 5 x daily PRN for severe pain, Disp-20 tablet, R-0, Local Print      !! oxyCODONE (ROXICODONE) 5 MG tablet Take 1 tablet (5 mg) by mouth every 6 hours as needed for severe pain, Disp-20 tablet, R-0, Local Print       !! - Potential duplicate medications found. Please discuss with provider.        CONTINUE these medications which have NOT CHANGED    Details   Atorvastatin Calcium (LIPITOR PO) Take 20 mg by mouth every evening, Historical      candesartan (ATACAND) 32 MG tablet Take 16 mg by mouth every morning 1/2 of a 32 mg tablet daily in AM, Historical      cholecalciferol 50 MCG (2000 UT) tablet Take 1 tablet by mouth daily, Historical      clonazePAM (KLONOPIN) 1 MG tablet Take 2 mg (2 tablets) in the morning and take 1 mg in the afternoon., Historical      docusate sodium (COLACE) 100 MG capsule Take 200 mg by mouth At Bedtime, Historical      famotidine (PEPCID) 20 MG tablet Take 1 tablet (20 mg) by mouth 2 times daily, Historical      ibuprofen (ADVIL/MOTRIN) 200 MG tablet Take 600 mg by mouth every 8 hours as needed for pain, Historical      lactulose (GENERLAC) 10 GM/15ML solution Take 20 g by mouth daily as needed, Historical      melatonin 5 MG tablet Take 10 mg by mouth At Bedtime, Historical       morphine (MS CONTIN) 15 MG CR tablet Take 30 mg by mouth At Bedtime, Historical      MOVANTIK 25 MG TABS tablet Take 1 tablet by mouth every morning (before breakfast), JO-ANN, Historical      Omega-3 Fatty Acids (OMEGA-3 FISH OIL PO) Take 1 g by mouth daily, Historical      !! PARoxetine (PAXIL-CR) 12.5 MG 24 hr tablet Take 12.5 mg by mouth every morning Takes with 37.5 mg tablet for total of 50 mg every morning., Historical      !! PARoxetine (PAXIL-CR) 37.5 MG 24 hr tablet Take 37.5 mg by mouth every morning Takes with 12.5 mg tablet for total of 50 mg every morning., Historical      PROPRANOLOL HCL PO Take 10 mg by mouth 2 times daily, Historical      RisperiDONE (RISPERDAL PO) Take 2 mg (2 tablets) in the morning and take 1 mg (1 tablet) in the evening., Historical      sennosides (SENOKOT) 8.6 MG tablet Take 5 tablets by mouth At Bedtime, Historical      TRELEGY ELLIPTA 100-62.5-25 MCG/ACT oral inhaler INHALE 1 PUFF BY MOUTH DAILY, Disp-60 each, R-11, JO-ANN, E-PrescribeZERO refills remain on this prescription. Your patient is requesting advance approval of refills for this medication to PREVENT ANY MISSED DOSES      albuterol (PROAIR HFA/PROVENTIL HFA/VENTOLIN HFA) 108 (90 Base) MCG/ACT inhaler INHALE 2 PUFFS INTO THE LUNGS EVERY 6 HOURS AS NEEDED FOR SHORTNESS OF BREATH OR DIFFICULT BREATHING OR WHEEZING, Disp-8.5 g, R-11, E-Prescribe      multivitamin w/minerals (THERA-VIT-M) tablet Take 1 tablet by mouth daily, Historical      NARCAN 4 MG/0.1ML nasal spray Spray 0.1 mLs in nostril, JO-ANN, Historical       !! - Potential duplicate medications found. Please discuss with provider.        STOP taking these medications       oxyCODONE (ROXICODONE) 5 MG/5ML solution Comments:   Reason for Stopping:

## 2023-10-12 ENCOUNTER — PATIENT OUTREACH (OUTPATIENT)
Dept: SURGERY | Facility: CLINIC | Age: 63
End: 2023-10-12
Payer: COMMERCIAL

## 2023-10-13 NOTE — TELEPHONE ENCOUNTER
"[facility]: Cancer Care Follow-Up Note                                    Discussion with Patient:                                                      Patient had LEFT VATS SEGMENTECTOMY, LEFT THORACOTOMY complicated by a pulmonary artery bleed that required conversion to open on 10/2/2023. Discharged to home with no drains/tubes on 10/7/2023.    Called to complete 48 hour post discharge check in. Spoke with patient. Patient reports that \"it's been tough but he is doing ok\".   Reports pain is 3-4/10. Has been controlled with Oxycodone. Oxycodone is being managed by his Pain Clinic provider (15mg every 4-6 hours prn).  Spoke with patient about adding alternating Tylenol and ibuprofen to help with the post-operative pain. Patient verbalized that he does not like, nor will he take Tylenol. Denies using ice or heat, but verbalized appreciation of the reminder that he can use.     Patient reports having regular bowel movements. Using pre-op regimen on senna and colace.    Reports going up stairs is tough, has some VALLECILLO. No SOB when resting. Is using Incentive Spirometer. Reports is doing set of breaths about 6 times per day and is up to 2500. Encouraged continual use and continue to increase activity.    Denies fever. Reports has showered and changed dressings. Reports incisions look good and there has been very little drainage on dressing. Reviewed with patient signs and symptoms of infection to watch for.        Intervention/Education provided during outreach:                                                       Encouraged questions. Patient denies any questions at this time. Reports that he has his 1 week follow up with his PCP tomorrow October 13th.  Reviewed 1 month follow up appointments date and times.  Offered patient writer's call back information should any questions arise. Patient declined to take writer's number, stating \"my local doctor can pretty much take care of anything\".  Patient appreciated " writer's call.     Patient to follow up as scheduled at next appt    Signature:  Nohelia Renee RN, BSN  Thoracic Surgery RN Care Coordinator

## 2023-10-19 NOTE — TELEPHONE ENCOUNTER
Received vcm from pt stating he had an appointment on 10/20, but doesn't know what happened to it. Informed pt there was a 10/30 POP that was moved to 11/2. Pt stated he is aware of that and just wanted to confirm there was no thing for 10/20.    Julianna Burnette on 10/19/2023 at 12:08 PM

## 2023-11-02 ENCOUNTER — HOSPITAL ENCOUNTER (OUTPATIENT)
Dept: RADIOLOGY | Facility: HOSPITAL | Age: 63
Discharge: HOME | End: 2023-11-02
Attending: CLINICAL NURSE SPECIALIST | Admitting: CLINICAL NURSE SPECIALIST
Payer: COMMERCIAL

## 2023-11-02 ENCOUNTER — TELEPHONE (OUTPATIENT)
Dept: PULMONOLOGY | Facility: CLINIC | Age: 63
End: 2023-11-02

## 2023-11-02 ENCOUNTER — VIRTUAL VISIT (OUTPATIENT)
Dept: PULMONOLOGY | Facility: CLINIC | Age: 63
End: 2023-11-02
Attending: CLINICAL NURSE SPECIALIST
Payer: COMMERCIAL

## 2023-11-02 ENCOUNTER — PATIENT OUTREACH (OUTPATIENT)
Dept: SURGERY | Facility: CLINIC | Age: 63
End: 2023-11-02

## 2023-11-02 VITALS
HEART RATE: 52 BPM | OXYGEN SATURATION: 96 % | DIASTOLIC BLOOD PRESSURE: 63 MMHG | SYSTOLIC BLOOD PRESSURE: 92 MMHG | BODY MASS INDEX: 31.88 KG/M2 | WEIGHT: 228.6 LBS

## 2023-11-02 DIAGNOSIS — C34.12 MALIGNANT NEOPLASM OF UPPER LOBE OF LEFT LUNG (H): Primary | ICD-10-CM

## 2023-11-02 DIAGNOSIS — R91.1 LUNG NODULE: ICD-10-CM

## 2023-11-02 PROCEDURE — 99214 OFFICE O/P EST MOD 30 MIN: CPT | Performed by: CLINICAL NURSE SPECIALIST

## 2023-11-02 PROCEDURE — 71046 X-RAY EXAM CHEST 2 VIEWS: CPT

## 2023-11-02 RX ORDER — OXYCODONE HYDROCHLORIDE 10 MG/1
10 TABLET ORAL EVERY 4 HOURS PRN
COMMUNITY
Start: 2023-10-16

## 2023-11-02 NOTE — TELEPHONE ENCOUNTER
12:46PM- Phoned and left detailed voicemail regarding today's appointment. Provider is feeling under the weather today and would like to do either a video call or phone visit today instead. Waiting for callback.    Ramsey ZAIDI CMA

## 2023-11-02 NOTE — PROGRESS NOTES
THORACIC SURGERY FOLLOW UP VISIT    I saw Mr. Frost in follow-up today. The clinical summary follows:     PREOP DIAGNOSIS   Lung nodule  PROCEDURE   Uniportal thoracoscopic left upper lobe segmentectomy (S1-S3), converted to thoracotomy, mediastinal lymph node dissection    DATE OF PROCEDURE  10/02/2023    HISTOPATHOLOGY    Invasive adenocarcinoma of left upper lobe of lung, tumor size 2.1 cm, invasive component 1.3 cm pT1bN0.     INTRAOPERATIVE COMPLICATIONS  Thoracoscopy: difficult due to inflammation around broncho-vascular structures  Bleeding from the origin of A2, probably because a small hem-o-lock clip slipped partial of. Controlled with pressure, thoracotomy and proximal control of the LEFT main PA. No intraoperative hypotension from bleeding, no uncontrolled bleeding at any point.    POST OPERATIVE COMPLICATIONS  None    INTERVAL STUDIES  CXR: final report pending at time of clinic visit. To my review it appears as expected with a small pleural effusion in the left base. I do not see a pneumothorax.    Past Medical History:   Diagnosis Date    Anxiety     Chronic back pain     Chronic kidney disease     Cigar smoker     COPD (chronic obstructive pulmonary disease) (H)     Depression     GERD (gastroesophageal reflux disease)     Hyperlipidemia     Hypertension     Obesity     Sleep apnea       Past Surgical History:   Procedure Laterality Date    APPENDECTOMY      CERVICAL LAMINECTOMY      partial x 2    IR LUMBAR EPIDURAL STEROID INJECTION  10/09/2002    THORACOSCOPIC LOBECTOMY LUNG Left 10/2/2023    Procedure: LEFT THORACOSCOPIC UPPER LOBE WEDGE RESECTION, SEGMENTECTOMY, LEFT THORACOTOMY, BRONCHOSCOPY;  Surgeon: Jorge Gotti MD;  Location: [facility] OR    TONSILLECTOMY       Social History     Socioeconomic History    Marital status:      Spouse name: Not on file    Number of children: 2    Years of education: Not on file    Highest education level: Not on file   Occupational History     Occupation: retired   Tobacco Use    Smoking status: Former     Packs/day: 0.25     Years: 0.50     Additional pack years: 0.00     Total pack years: 0.13     Types: Cigars, Cigarettes     Quit date: 2022     Years since quittin.0    Smokeless tobacco: Never   Vaping Use    Vaping Use: Never used   Substance and Sexual Activity    Alcohol use: Not Currently    Drug use: Never    Sexual activity: Not on file   Other Topics Concern    Not on file   Social History Narrative    Patient is  - has chronic low back pain     Social Determinants of Health     Financial Resource Strain: Not on file   Food Insecurity: Not on file   Transportation Needs: Not on file   Physical Activity: Not on file   Stress: Not on file   Social Connections: Not on file   Interpersonal Safety: Not on file   Housing Stability: Not on file       SUBJECTIVE   Alex is doing well. He denies any pain or cough. He only has some shortness of breath ifg he is being active-for instance walking up and down stairs. He is sleeping well.     OBJECTIVE  BP 92/63 (BP Location: Left arm, Patient Position: Sitting, Cuff Size: Adult Large)   Pulse 52   Wt 103.7 kg (228 lb 9.6 oz)   SpO2 96%   BMI 31.88 kg/m       His incision still has numerous steri strips in place that are nearly falling off. I removed these in clinic. His incision is healed nicely.    We reviewed his pathology and he was very pleased to hear that his lung cancer is early stage, the lymph nodes are negative and her does not need additional treatment.    From a personal perspective, he is here with his wife.     IMPRESSION  62 year-old male status post uniportal thoracoscopic left upper lobe segmentectomy (S1-S3) converted to thoracotomy and mediastinal lymph node dissection for a pT1bN0 (stage IA2) non small cell lung cancer. He is here today for post operative follow up.     PLAN  I spent 25 min on the date of the encounter in chart review, patient visit, review of tests,  documentation and/or discussion with other providers about the issues documented above. I reviewed the plan as follows:  Follow up in 2 months with a new baseline chest CT  1. Necessary Tests & Appointments: chest CT  All questions were answered and the patient and present family were in agreement with the plan.  I appreciate the opportunity to participate in the care of your patient and will keep you updated.  Sincerely,

## 2023-11-07 ENCOUNTER — OFFICE VISIT (OUTPATIENT)
Dept: PULMONOLOGY | Facility: CLINIC | Age: 63
End: 2023-11-07
Payer: COMMERCIAL

## 2023-11-07 VITALS
OXYGEN SATURATION: 97 % | SYSTOLIC BLOOD PRESSURE: 130 MMHG | BODY MASS INDEX: 31.19 KG/M2 | WEIGHT: 223.6 LBS | HEART RATE: 48 BPM | DIASTOLIC BLOOD PRESSURE: 59 MMHG

## 2023-11-07 DIAGNOSIS — J44.9 CHRONIC OBSTRUCTIVE PULMONARY DISEASE, UNSPECIFIED COPD TYPE (H): Primary | ICD-10-CM

## 2023-11-07 DIAGNOSIS — C34.12 MALIGNANT NEOPLASM OF UPPER LOBE OF LEFT LUNG (H): ICD-10-CM

## 2023-11-07 DIAGNOSIS — R91.8 PULMONARY NODULES: ICD-10-CM

## 2023-11-07 PROCEDURE — 99214 OFFICE O/P EST MOD 30 MIN: CPT | Performed by: INTERNAL MEDICINE

## 2023-11-07 NOTE — PATIENT INSTRUCTIONS
Continue TRELEGY, once a day, rinse your mouth after each use  Albuterol HFA two puffs every 6 hours as needed, use it before activities   Avoid eating close to bedtime  Raise the head of the bed  Pepcid twice a day   Resume pulmonary rehab  Patient is already scheduled for chest CT scan January 2024, will follow results   Follow up in 6 months

## 2023-11-08 NOTE — PROGRESS NOTES
PULMONARY OUTPATIENT FOLLOW UP NOTE        Assessment:      Stage IB lung cancer  PET (+) mixed attenuation but predominantly solid lesion has developed in the anterior left upper lobe measuring 17 x 20 mm when comparing CT scans 2021 and July 2023.   No abnormal mediastinal adenopathy.   Evaluated by thoracic surgery , underwent left upper lobe wedge resection on 10/2/2023.   Pathology (+) adenocarcinoma. Mediastinal node biopsy was negative.   Schedule for follow up chest CT scan on 1/4/2024  COPD  Significant tobacco use. Chest CT scan showed upper zone predominant paraseptal emphysema   PFTs showed a moderate obstructive lung disease, air trapping, normal diffusion capacity   Continue ICS/LABA/LAMA. Albuterol HFA as needed  Resume pulmonary rehabilitation  GERD  Avoid eating close to bedtime  Raise the head of the bed  Pepcid twice a day   Obesity Body mass index is 31.19 kg/m .     Plan:      Continue TRELEGY, once a day, rinse your mouth after each use  Albuterol HFA two puffs every 6 hours as needed, use it before activities   Avoid eating close to bedtime  Raise the head of the bed  Pepcid twice a day   Resume pulmonary rehab  Patient is already scheduled for chest CT scan January 2024, will follow results   Follow up in 6 months         Elan López  Pulmonary / Critical Care  11/07/23     CC:     Chief Complaint   Patient presents with    Follow Up     3 MONTH FOLLOW UP:  Pulmonary nodules-SEES DR. FRANKLIN  Abnormal PET of left lung  Gastroesophageal reflux disease without esophagitis  Chronic obstructive pulmonary disease, unspecified COPD type (H)         HPI:      Alex Frost is a 62 year old male who presents for follow up appointment.  Patient has history of HTN, COPD, lung cancer , Stage IB s/p wedge resection of ALYSA nodule 10/2/23 path (+) adenocarcinoma, GERD.  Evaluated by thoracic surgery for PET (+) ALYSA solid lesion anterior left upper lobe measuring 17 x 20 mm. Underwent Left  upper lobe wedge resection. Negative mediastinal adenopathy.   Pathology (+) adenocarcinoma.   Reports unchanged exertional dyspnea , able to walk over a block before stopping. Difficulty climbing a flight of stairs. Patient has stopped physical therapy due to surgery.   Reports occasional wheezes and mild dry cough. No  hemoptysis.   Denies fever, chills or night sweats.   Uses LABA/ICS/LAMA inhaler daily and albuterol HFA at least once a week.  Denies chest pain, orthopnea, PND, or swelling of LEs.  Denies sleeping problems, feels refresh in AM.   Reports mild acid reflux, on pepcid twice a day.  Tobacco user < 1 ppd for 40 years plus, quit a year ago.     Past Medical History :     Past Medical History:   Diagnosis Date    Anxiety     Chronic back pain     Chronic kidney disease     Cigar smoker     COPD (chronic obstructive pulmonary disease) (H)     Depression     GERD (gastroesophageal reflux disease)     Hyperlipidemia     Hypertension     Obesity     Sleep apnea           Medications:     Current Outpatient Medications   Medication    acetaminophen (TYLENOL) 325 MG tablet    albuterol (PROAIR HFA/PROVENTIL HFA/VENTOLIN HFA) 108 (90 Base) MCG/ACT inhaler    Atorvastatin Calcium (LIPITOR PO)    candesartan (ATACAND) 32 MG tablet    cholecalciferol 50 MCG (2000 UT) tablet    clonazePAM (KLONOPIN) 1 MG tablet    docusate sodium (COLACE) 100 MG capsule    famotidine (PEPCID) 20 MG tablet    ibuprofen (ADVIL/MOTRIN) 200 MG tablet    lactulose (GENERLAC) 10 GM/15ML solution    melatonin 5 MG tablet    morphine (MS CONTIN) 15 MG CR tablet    MOVANTIK 25 MG TABS tablet    multivitamin w/minerals (THERA-VIT-M) tablet    NARCAN 4 MG/0.1ML nasal spray    Omega-3 Fatty Acids (OMEGA-3 FISH OIL PO)    oxyCODONE IR (ROXICODONE) 15 MG tablet    PARoxetine (PAXIL-CR) 12.5 MG 24 hr tablet    PARoxetine (PAXIL-CR) 37.5 MG 24 hr tablet    PROPRANOLOL HCL PO    RisperiDONE (RISPERDAL PO)    sennosides (SENOKOT) 8.6 MG tablet     TRESERENA ELLIPTA 100-62.5-25 MCG/ACT oral inhaler    amiodarone (PACERONE) 200 MG tablet    amiodarone (PACERONE) 200 MG tablet    furosemide (LASIX) 20 MG tablet     No current facility-administered medications for this visit.      Social History :     Social History     Socioeconomic History    Marital status:      Spouse name: Not on file    Number of children: 2    Years of education: Not on file    Highest education level: Not on file   Occupational History    Occupation: retired   Tobacco Use    Smoking status: Former     Packs/day: 0.25     Years: 0.50     Additional pack years: 0.00     Total pack years: 0.13     Types: Cigars, Cigarettes     Quit date: 2022     Years since quittin.0    Smokeless tobacco: Never   Vaping Use    Vaping Use: Never used   Substance and Sexual Activity    Alcohol use: Not Currently    Drug use: Never    Sexual activity: Not on file   Other Topics Concern    Not on file   Social History Narrative    Patient is  - has chronic low back pain     Social Determinants of Health     Financial Resource Strain: Not on file   Food Insecurity: Not on file   Transportation Needs: Not on file   Physical Activity: Not on file   Stress: Not on file   Social Connections: Not on file   Interpersonal Safety: Not on file   Housing Stability: Not on file          Family History :     Family History   Problem Relation Age of Onset    Emphysema Mother     Lung Cancer Father     Anesthesia Reaction No family hx of     Thrombosis No family hx of        Review of Systems  A 12 point comprehensive review of systems was negative except as noted.        Objective:     /59 (BP Location: Left arm, Patient Position: Sitting, Cuff Size: Adult Regular)   Pulse (!) 48   Wt 101.4 kg (223 lb 9.6 oz)   SpO2 97%   BMI 31.19 kg/m      Gen: awake, alert, no distress  HEENT: pink conjunctiva, moist mucosa, Mallampati II/IV  Neck: no thyromegaly, masses or JVD  Lungs: decrease breath sounds  at the bases otherwise clear  CV: regular, no murmurs or gallops appreciated  Abdomen: soft, NT, BS wnl  Ext: no edema  Neuro: CN II-XII intact, non focal      Diagnostic tests:     PATHOLOGY 10/2/2023    A. LUNG, LEFT UPPER LOBE WEDGE, WEDGE RESECTION:  - Lung with subpleural fibroelastotic changes more consistent with apical cap  - Focus of organizing pneumonia  - Negative for malignancy     B, J. LUNG, LEFT UPPER LOBE WEDGE #2, WEDGE RESECTION AND COMPLETION S1-S3 SEGMENTECTOMY:  - INVASIVE ADENOCARCINOMA, ACINAR PREDOMINANT  - Tumor size: 2.1 cm in total; invasive component is 1.3 cm  - Viscera pleural invasion is not identified  - Lymphovascular invasion is not identified  - Bronchial, vascular and parenchymal margins are negative  - AJCC Pathologic Stage: pT1b, N0     C. LYMPH NODES, LEVEL 7:  - Multiple fragments of lymph nodes, negative for carcinoma (0/5)     D. LYMPH NODE, LEVEL 9L:  - One lymph node, negative for carcinoma (0/1)     E.  LYMPH NODE, LEVEL 10L:  - Three lymph nodes, negative for carcinoma (0/3)     F. LYMPH NODE, LEVEL 11L:  - Two lymph nodes, negative for carcinoma (0/2)     G. LYMPH NODE, LEVEL 5:  - Three lymph nodes, negative for carcinoma (0/3)     H. LUNG, LEFT UPPER LOBE WEDGE #3, WEDGE RESECTION:  - Lung tissue, negative for malignancy     I. LYMPH NODE, LEVEL 12L:  - One lymph node, negative for carcinoma (0/1)         PFTs:     FEV1/FVC is 56% and is reduced.  FEV1 is 2.03L (55%) predicted and is reduced.  FVC is 3.62L (75%) predicted and normal.  There was no improvement in spirometry after a single inhaled dose of bronchodilator.  TLC is 8.65L (117%) predicted and is normal.  RV is 4.68L (189%) predicted and is increased.  DLCO is 27.07ml/min/hg (95%) predicted and is normal when it is corrected for hemoglobin.  Flow volume loops indicate scooping of the expiratory limbs.     IMAGES:     LOW DOSE LUNG CANCER SCREENING CT CHEST  LOCATION: [facility]  DATE/TIME: 6/14/2021 7:01 AM     INDICATION: Lung cancer screening. History of smoking. High risk patient with greater than 30 pack year smoking history.  COMPARISON: CT of the chest 2/28/2019 and 11/25/2015  FINDINGS:  NODULES: Punctate calcified granuloma posterolateral right lower lobe, definitively benign. No new or enlarging lung nodules.  LUNGS AND PLEURA: Foci of apical pleural parenchymal scarring are present and long-standing. Upper zone predominant paraseptal emphysema is also present. Central airway wall thickening. No bronchiectasis. A few foci of peripheral inspissated material   And subsegmental airways. No lung consolidation. No pleural space abnormality.  MEDIASTINUM: Cardiac chambers are normal in size and there is no pericardial effusion. No enlarged thoracic lymph nodes. Normal caliber thoracic aorta. Mild degenerative calcification of the aortic root.  CORONARY ARTERY CALCIFICATION: Mild.  LIMITED UPPER ABDOMEN: No actionable findings in the imaged upper abdomen.  MUSCULOSKELETAL: Degenerative osteophytes of the imaged lower cervical spine into much lesser extent the lower thoracic spine. No aggressive or destructive bone lesions.  IMPRESSION:  Negative for lung cancer screening purposes.    LOW DOSE LUNG CANCER SCREENING CT CHEST  LOCATION: [facility]  DATE: 7/19/2023   INDICATION: Lung cancer screening. History of smoking. High risk patient.  COMPARISON: CT of the chest 06/14/2021, the 28th 2019  FINDINGS:  NODULES: Mixed attenuation but predominantly solid lesion is developed in the anterior left upper lobe measuring 17 x 20 mm (series 4, image 46) adjacent to an area of pleural-parenchymal scarring. A cluster of predominantly groundglass attenuation nodules has developed in the posterolateral right lower lobe the largest of which is 11 mm (series 4, image 98) as well as in the apex with largest in this location measuring 6 mm.  LUNGS AND PLEURA:  Hyperinflation is edema. No lung fibrosis. Central airways are patent and normal caliber. No pleural effusion.  MEDIASTINUM: Cardiac chambers are normal in size. No pericardial effusion. Normal caliber thoracic aorta. Conventional arch anatomy. No lymphadenopathy. Decompressed esophagus.  CORONARY ARTERY CALCIFICATION: Mild.  LIMITED UPPER ABDOMEN: No actionable findings in the imaged upper abdomen.   MUSCULOSKELETAL: Disc space narrowing and small marginal osteophytes of the thoracic spine.  IMPRESSION:  1.  New predominantly solid nodule in the anterior apical left upper lobe adjacent to a focus of pleural parenchymal scarring measuring 17 x 20 mm.  2.  Two separate clustered foci of groundglass attenuation nodules right apex and inferior lateral right upper lobe, more consistent with inflammatory nodules.       PET ONCOLOGY (EYES TO THIGHS)  LOCATION: [facility]  DATE: 8/3/2023  INDICATION: Other nonspecific abnormal findings of lung field.  COMPARISON: CT chest 07/19/2023. CT chest 06/14/2021.  CONTRAST: None.  PET/CT FINDINGS: FDG avid spiculated nodule the anterior left upper lobe abutting the anterior pleura which measures 2.0 x 1.7 cm (SUV max 8.4), which is similar compared to 07/18/2023 and has grown since 06/14/2021 where it measured 1.1 x 0.8 cm.   Findings are suspicious for primary lung neoplasm without metastasis.  While there is near complete resolution of the areas of clustered groundglass nodules at the right apex beneath the apical scarring and in the inferior lateral right upper lobe which are not FDG avid, there has been interval development of a new cluster of endobronchial nodules in the right lower lobe adjacent to the minor fissure (SUV max 3.2) since 07/19/2023. Findings suggest that these reflect a multifocal infectious/inflammatory process.  CT FINDINGS: Mild carotid artery bifurcation calcification. Mild coronary artery calcification. Left renal cyst. Scattered  atherosclerotic calcifications. Pelvic phleboliths. Mild bladder wall thickening which may be due to underdistention. Multilevel degenerative changes of the spine                                          IMPRESSION:  1. Findings suspicious for left upper lobe primary lung neoplasm without metastasis.  2. Waxing/waning clusters of groundglass nodularity in the right lung are favored to represent a multifocal infectious/inflammatory process, although warrant continued imaging surveillance.

## 2023-12-05 ENCOUNTER — HOSPITAL ENCOUNTER (OUTPATIENT)
Dept: CARDIAC REHAB | Facility: CLINIC | Age: 63
Discharge: HOME | End: 2023-12-05
Attending: FAMILY MEDICINE
Payer: COMMERCIAL

## 2023-12-05 PROCEDURE — 94625 PHY/QHP OP PULM RHB W/O MNTR: CPT

## 2023-12-07 ENCOUNTER — HOSPITAL ENCOUNTER (OUTPATIENT)
Dept: CARDIAC REHAB | Facility: CLINIC | Age: 63
Discharge: HOME | End: 2023-12-07
Attending: FAMILY MEDICINE
Payer: COMMERCIAL

## 2023-12-07 PROCEDURE — 94625 PHY/QHP OP PULM RHB W/O MNTR: CPT

## 2023-12-12 ENCOUNTER — HOSPITAL ENCOUNTER (OUTPATIENT)
Dept: CARDIAC REHAB | Facility: CLINIC | Age: 63
Discharge: HOME | End: 2023-12-12
Attending: INTERNAL MEDICINE
Payer: COMMERCIAL

## 2023-12-12 PROCEDURE — 94625 PHY/QHP OP PULM RHB W/O MNTR: CPT

## 2023-12-14 ENCOUNTER — HOSPITAL ENCOUNTER (OUTPATIENT)
Dept: CARDIAC REHAB | Facility: CLINIC | Age: 63
Discharge: HOME | End: 2023-12-14
Attending: INTERNAL MEDICINE
Payer: COMMERCIAL

## 2023-12-14 PROCEDURE — 94625 PHY/QHP OP PULM RHB W/O MNTR: CPT

## 2023-12-19 ENCOUNTER — HOSPITAL ENCOUNTER (OUTPATIENT)
Dept: CARDIAC REHAB | Facility: CLINIC | Age: 63
Discharge: HOME | End: 2023-12-19
Attending: INTERNAL MEDICINE
Payer: COMMERCIAL

## 2023-12-19 PROCEDURE — 94625 PHY/QHP OP PULM RHB W/O MNTR: CPT

## 2023-12-21 ENCOUNTER — HOSPITAL ENCOUNTER (OUTPATIENT)
Dept: CARDIAC REHAB | Facility: CLINIC | Age: 63
Discharge: HOME | End: 2023-12-21
Attending: INTERNAL MEDICINE
Payer: COMMERCIAL

## 2023-12-21 PROCEDURE — 94625 PHY/QHP OP PULM RHB W/O MNTR: CPT

## 2023-12-26 ENCOUNTER — HOSPITAL ENCOUNTER (OUTPATIENT)
Dept: CARDIAC REHAB | Facility: CLINIC | Age: 63
Discharge: HOME | End: 2023-12-26
Attending: INTERNAL MEDICINE
Payer: COMMERCIAL

## 2023-12-26 PROCEDURE — 94625 PHY/QHP OP PULM RHB W/O MNTR: CPT

## 2024-01-02 ENCOUNTER — HOSPITAL ENCOUNTER (OUTPATIENT)
Dept: CARDIAC REHAB | Facility: CLINIC | Age: 64
Discharge: HOME | End: 2024-01-02
Attending: INTERNAL MEDICINE
Payer: COMMERCIAL

## 2024-01-02 PROCEDURE — 94625 PHY/QHP OP PULM RHB W/O MNTR: CPT

## 2024-01-04 ENCOUNTER — OFFICE VISIT (OUTPATIENT)
Dept: PULMONOLOGY | Facility: CLINIC | Age: 64
End: 2024-01-04
Payer: COMMERCIAL

## 2024-01-04 ENCOUNTER — HOSPITAL ENCOUNTER (OUTPATIENT)
Dept: CT IMAGING | Facility: HOSPITAL | Age: 64
Discharge: HOME | End: 2024-01-04
Attending: CLINICAL NURSE SPECIALIST | Admitting: CLINICAL NURSE SPECIALIST
Payer: COMMERCIAL

## 2024-01-04 VITALS
BODY MASS INDEX: 30.27 KG/M2 | OXYGEN SATURATION: 95 % | SYSTOLIC BLOOD PRESSURE: 134 MMHG | DIASTOLIC BLOOD PRESSURE: 80 MMHG | WEIGHT: 217 LBS | HEART RATE: 60 BPM

## 2024-01-04 DIAGNOSIS — C34.12 MALIGNANT NEOPLASM OF UPPER LOBE OF LEFT LUNG (H): ICD-10-CM

## 2024-01-04 DIAGNOSIS — C34.12 MALIGNANT NEOPLASM OF UPPER LOBE OF LEFT LUNG (H): Primary | ICD-10-CM

## 2024-01-04 PROCEDURE — 250N000011 HC RX IP 250 OP 636: Performed by: CLINICAL NURSE SPECIALIST

## 2024-01-04 PROCEDURE — 71260 CT THORAX DX C+: CPT | Mod: ME

## 2024-01-04 PROCEDURE — 99213 OFFICE O/P EST LOW 20 MIN: CPT | Performed by: CLINICAL NURSE SPECIALIST

## 2024-01-04 RX ORDER — IOPAMIDOL 755 MG/ML
90 INJECTION, SOLUTION INTRAVASCULAR ONCE
Status: COMPLETED | OUTPATIENT
Start: 2024-01-04 | End: 2024-01-04

## 2024-01-04 RX ADMIN — IOPAMIDOL 90 ML: 755 INJECTION, SOLUTION INTRAVENOUS at 12:55

## 2024-01-04 NOTE — PROGRESS NOTES
THORACIC SURGERY FOLLOW UP VISIT      I saw Mr. Frost in follow-up today. The clinical summary follows:     PREOP DIAGNOSIS   Lung nodule  PROCEDURE   Uniportal thoracoscopic left upper lobe segmentectomy (S1-S3), converted to thoracotomy, mediastinal lymph node dissection     DATE OF PROCEDURE  10/02/2023    HISTOPATHOLOGY    Invasive adenocarcinoma of left upper lobe of lung, tumor size 2.1 cm, invasive component 1.3 cm pT1bN0     INTRAOPERATIVE COMPLICATIONS  Thoracoscopy: difficult due to inflammation around broncho-vascular structures  Bleeding from the origin of A2, probably because a small hem-o-lock clip slipped partial of. Controlled with pressure, thoracotomy and proximal control of the LEFT main PA. No intraoperative hypotension from bleeding, no uncontrolled bleeding at any point.    COMPLICATIONS  None    INTERVAL STUDIES  CT chest: final report pending at time of clinic visit. I see some left pleural fluid as well as some stable small ground glass opacities in the right lung.     Past Medical History:   Diagnosis Date    Anxiety     Chronic back pain     Chronic kidney disease     Cigar smoker     COPD (chronic obstructive pulmonary disease) (H)     Depression     GERD (gastroesophageal reflux disease)     Hyperlipidemia     Hypertension     Obesity     Sleep apnea      Past Surgical History:   Procedure Laterality Date    APPENDECTOMY      CERVICAL LAMINECTOMY      partial x 2    IR LUMBAR EPIDURAL STEROID INJECTION  10/09/2002    THORACOSCOPIC LOBECTOMY LUNG Left 10/2/2023    Procedure: LEFT THORACOSCOPIC UPPER LOBE WEDGE RESECTION, SEGMENTECTOMY, LEFT THORACOTOMY, BRONCHOSCOPY;  Surgeon: Jorge Gotti MD;  Location: [facility] OR    TONSILLECTOMY        Social History     Socioeconomic History    Marital status:      Spouse name: Not on file    Number of children: 2    Years of education: Not on file    Highest education level: Not on file   Occupational History    Occupation: retired    Tobacco Use    Smoking status: Former     Packs/day: 0.25     Years: 0.50     Additional pack years: 0.00     Total pack years: 0.13     Types: Cigars, Cigarettes     Quit date: 2022     Years since quittin.1    Smokeless tobacco: Never   Vaping Use    Vaping Use: Never used   Substance and Sexual Activity    Alcohol use: Not Currently    Drug use: Never    Sexual activity: Not on file   Other Topics Concern    Not on file   Social History Narrative    Patient is  - has chronic low back pain     Social Determinants of Health     Financial Resource Strain: Not on file   Food Insecurity: Not on file   Transportation Needs: Not on file   Physical Activity: Not on file   Stress: Not on file   Social Connections: Not on file   Interpersonal Safety: Not on file   Housing Stability: Not on file      SUBJECTIVE   Alex is doing well. He does still have an area of numbness around the left breast and occasionally has left sided chest pain if he lies on the left side or when he coughs or sneezes. He has not been particularly active the last few months.    OBJECTIVE  /80 (BP Location: Left arm, Patient Position: Chair, Cuff Size: Adult Large)   Pulse 60   Wt 98.4 kg (217 lb)   SpO2 95%   BMI 30.27 kg/m       From a personal perspective, he is here with his wife, Brisa.    IMPRESSION  63 year-old male status post uniportal thoracoscopic left upper lobe segmentectomy (S1-S3) converted to thoracotomy and mediastinal lymph node dissection for a pT1bN0 (stage IA2) non small cell lung cancer. He is here today with a new baseline chest CT.    I reviewed his CT images with him and Brisa. I recommend he start using the Incentive Spirometer again to help expand the left lung and help redistribute the pleural fluid. He still has this at home. I advised him to use it once an hour while awake. I also encouraged him to try and increase his activity as this will also help to expand the lungs and move the fluid.     I  explained that it is not abnormal for him to still have some numbness around the incisions and explained that putting pressure on his left chest wall can still cause some discomfort as there is scarring from the incisions in that area.      PLAN  I spent 15 min on the date of the encounter in chart review, patient visit, review of tests, documentation and/or discussion with other providers about the issues documented above. I reviewed the plan as follows:  Follow up with me in 6 months with a chest CT prior. Use the Incentive Spirometer every hour while awake. Try to increase activity.  1. Necessary Tests & Appointments: chest CT  All questions were answered and the patient and present family were in agreement with the plan.  I appreciate the opportunity to participate in the care of your patient and will keep you updated.  Sincerely,

## 2024-01-09 ENCOUNTER — HOSPITAL ENCOUNTER (OUTPATIENT)
Dept: CARDIAC REHAB | Facility: CLINIC | Age: 64
Discharge: HOME | End: 2024-01-09
Attending: INTERNAL MEDICINE
Payer: COMMERCIAL

## 2024-01-09 PROCEDURE — 94625 PHY/QHP OP PULM RHB W/O MNTR: CPT

## 2024-01-21 ENCOUNTER — HEALTH MAINTENANCE LETTER (OUTPATIENT)
Age: 64
End: 2024-01-21

## 2024-03-11 DIAGNOSIS — J44.9 CHRONIC OBSTRUCTIVE PULMONARY DISEASE, UNSPECIFIED COPD TYPE (H): ICD-10-CM

## 2024-03-11 RX ORDER — FLUTICASONE FUROATE, UMECLIDINIUM BROMIDE AND VILANTEROL TRIFENATATE 100; 62.5; 25 UG/1; UG/1; UG/1
1 POWDER RESPIRATORY (INHALATION) DAILY
Qty: 60 EACH | Refills: 3 | Status: SHIPPED | OUTPATIENT
Start: 2024-03-11 | End: 2024-06-03

## 2024-03-12 DIAGNOSIS — J44.9 CHRONIC OBSTRUCTIVE PULMONARY DISEASE, UNSPECIFIED COPD TYPE (H): ICD-10-CM

## 2024-03-12 RX ORDER — ALBUTEROL SULFATE 90 UG/1
AEROSOL, METERED RESPIRATORY (INHALATION)
Qty: 8.5 G | Refills: 3 | Status: SHIPPED | OUTPATIENT
Start: 2024-03-12

## 2024-05-10 ENCOUNTER — LAB REQUISITION (OUTPATIENT)
Dept: LAB | Facility: CLINIC | Age: 64
End: 2024-05-10

## 2024-05-10 DIAGNOSIS — E78.2 MIXED HYPERLIPIDEMIA: ICD-10-CM

## 2024-05-10 DIAGNOSIS — Z12.5 ENCOUNTER FOR SCREENING FOR MALIGNANT NEOPLASM OF PROSTATE: ICD-10-CM

## 2024-05-10 PROCEDURE — G0103 PSA SCREENING: HCPCS | Performed by: FAMILY MEDICINE

## 2024-05-10 PROCEDURE — 80061 LIPID PANEL: CPT | Performed by: FAMILY MEDICINE

## 2024-05-10 PROCEDURE — 80053 COMPREHEN METABOLIC PANEL: CPT | Performed by: FAMILY MEDICINE

## 2024-05-11 LAB
ALBUMIN SERPL BCG-MCNC: 4.8 G/DL (ref 3.5–5.2)
ALP SERPL-CCNC: 62 U/L (ref 40–150)
ALT SERPL W P-5'-P-CCNC: 16 U/L (ref 0–70)
ANION GAP SERPL CALCULATED.3IONS-SCNC: 12 MMOL/L (ref 7–15)
AST SERPL W P-5'-P-CCNC: 17 U/L (ref 0–45)
BILIRUB SERPL-MCNC: 0.4 MG/DL
BUN SERPL-MCNC: 8.1 MG/DL (ref 8–23)
CALCIUM SERPL-MCNC: 9.5 MG/DL (ref 8.8–10.2)
CHLORIDE SERPL-SCNC: 101 MMOL/L (ref 98–107)
CHOLEST SERPL-MCNC: 162 MG/DL
CREAT SERPL-MCNC: 0.9 MG/DL (ref 0.67–1.17)
DEPRECATED HCO3 PLAS-SCNC: 24 MMOL/L (ref 22–29)
EGFRCR SERPLBLD CKD-EPI 2021: >90 ML/MIN/1.73M2
FASTING STATUS PATIENT QL REPORTED: ABNORMAL
FASTING STATUS PATIENT QL REPORTED: NORMAL
GLUCOSE SERPL-MCNC: 134 MG/DL (ref 70–99)
HDLC SERPL-MCNC: 47 MG/DL
LDLC SERPL CALC-MCNC: 89 MG/DL
NONHDLC SERPL-MCNC: 115 MG/DL
POTASSIUM SERPL-SCNC: 3.9 MMOL/L (ref 3.4–5.3)
PROT SERPL-MCNC: 7 G/DL (ref 6.4–8.3)
PSA SERPL DL<=0.01 NG/ML-MCNC: 0.26 NG/ML (ref 0–4.5)
SODIUM SERPL-SCNC: 137 MMOL/L (ref 135–145)
TRIGL SERPL-MCNC: 130 MG/DL

## 2024-06-02 DIAGNOSIS — J44.9 CHRONIC OBSTRUCTIVE PULMONARY DISEASE, UNSPECIFIED COPD TYPE (H): ICD-10-CM

## 2024-06-03 RX ORDER — FLUTICASONE FUROATE, UMECLIDINIUM BROMIDE AND VILANTEROL TRIFENATATE 100; 62.5; 25 UG/1; UG/1; UG/1
1 POWDER RESPIRATORY (INHALATION) DAILY
Qty: 60 EACH | Refills: 3 | Status: SHIPPED | OUTPATIENT
Start: 2024-06-03

## 2024-06-11 ENCOUNTER — VIRTUAL VISIT (OUTPATIENT)
Dept: PULMONOLOGY | Facility: CLINIC | Age: 64
End: 2024-06-11
Attending: INTERNAL MEDICINE
Payer: COMMERCIAL

## 2024-06-11 DIAGNOSIS — J44.9 CHRONIC OBSTRUCTIVE PULMONARY DISEASE, UNSPECIFIED COPD TYPE (H): ICD-10-CM

## 2024-06-11 DIAGNOSIS — R91.8 PULMONARY NODULES: ICD-10-CM

## 2024-06-11 DIAGNOSIS — R91.8 GROUND GLASS OPACITY PRESENT ON IMAGING OF LUNG: ICD-10-CM

## 2024-06-11 DIAGNOSIS — C34.12 MALIGNANT NEOPLASM OF UPPER LOBE OF LEFT LUNG (H): Primary | ICD-10-CM

## 2024-06-11 PROCEDURE — 99213 OFFICE O/P EST LOW 20 MIN: CPT | Mod: 95 | Performed by: INTERNAL MEDICINE

## 2024-06-11 PROCEDURE — G2211 COMPLEX E/M VISIT ADD ON: HCPCS | Mod: 95 | Performed by: INTERNAL MEDICINE

## 2024-06-11 ASSESSMENT — PAIN SCALES - GENERAL: PAINLEVEL: MODERATE PAIN (4)

## 2024-06-11 NOTE — PROGRESS NOTES
Virtual Visit Details    Type of service:  Video Visit   Video Start Time:  8:05 AM  Video End Time: 8:25 AM    Originating Location (pt. Location): Home    Distant Location (provider location):  On-site  Platform used for Video Visit: Sheila

## 2024-06-11 NOTE — NURSING NOTE
Is the patient currently in the state of [place]? YES    Visit mode:VIDEO    If the visit is dropped, the patient can be reconnected by: VIDEO VISIT: Text to cell phone:   Telephone Information:   Mobile [phone]       Will anyone else be joining the visit? NO  (If patient encounters technical issues they should call [phone] :[phone])    How would you like to obtain your AVS? MyChart    Are changes needed to the allergy or medication list? Yes PT-Oxycodone should be listed as 6 - 10mg tabs daily, no longer taking morphine and Pt stated no changes to allergies    Are refills needed on medications prescribed by this physician? NO    Reason for visit: RECHECK    Tyree VILLARREALF

## 2024-06-28 NOTE — PATIENT INSTRUCTIONS
Continue TRELEGY, once a day, rinse your mouth after each use  Albuterol HFA two puffs every 6 hours as needed, use it before activities   Avoid eating close to bedtime  Raise the head of the bed  Pepcid twice a day   Follow up previously ordered Chest CT scan 7/2024  Follow up in 6-7 months

## 2024-06-28 NOTE — PROGRESS NOTES
VIRTUAL PULMONARY NOTE        Assessment:      Stage IB lung cancer  PET 8/3/2023 (+) mixed attenuation but predominantly solid lesion has developed in the anterior left upper lobe measuring 17 x 20 mm when comparing CT scans 2021 and July 2023. No abnormal mediastinal adenopathy.   Evaluated by thoracic surgery , underwent left upper lobe wedge resection on 10/2/2023. Pathology (+) adenocarcinoma. Mediastinal node biopsy was negative.   Follow up chest CT scan on 1/4/2024, post operative changes ALYSA, thickening along fissure and near the surgical sutures, small left pleural effusion, unchanged GGO RUL.   Scheduled for follow up chest CT scan on July 11, 2024  COPD  Significant tobacco use. Chest CT scan showed upper zone predominant paraseptal emphysema   PFTs showed a moderate obstructive lung disease, air trapping, normal diffusion capacity   Continue ICS/LABA/LAMA. Albuterol HFA as needed  Participated in pulmonary rehabilitation  GERD  Avoid eating close to bedtime  Raise the head of the bed  Pepcid twice a day      Plan:      Continue TRELEGY, once a day, rinse your mouth after each use  Albuterol HFA two puffs every 6 hours as needed, use it before activities   Avoid eating close to bedtime  Raise the head of the bed  Pepcid twice a day   Follow up chest CT scan, already schedule for July 11, 2024  Follow up in 6 months      Video call visit time: 20 minutes     Elan López  Pulmonary / Critical Care  6/11/2024     CC:     Chief Complaint   Patient presents with    RECHECK      HPI:      Alex Frost is a 63 year old male who presents for follow up appointment.  Patient has history of HTN, COPD, lung cancer , Stage IB s/p wedge resection of ALYSA nodule 10/2/23 path (+) adenocarcinoma, negative mediastinal adenopathy, GGO RUL, GERD.    Reports doing well, participated in pulmonary rehab, able to walk over a block before stopping. Difficulty climbing a flight of stairs.   Reports occasional  wheezes and mild dry cough. No  hemoptysis.   Denies fever, chills or night sweats.   Uses LABA/ICS/LAMA inhaler daily and albuterol HFA at least once a week.  Denies chest pain, orthopnea, PND, or swelling of LEs.  Denies sleeping problems, feels refresh in AM.   Reports mild acid reflux, on pepcid twice a day.  Tobacco user < 1 ppd for 40 years plus, quit a year ago.     Past Medical History :     Past Medical History:   Diagnosis Date    Anxiety     Chronic back pain     Chronic kidney disease     Cigar smoker     COPD (chronic obstructive pulmonary disease) (H)     Depression     GERD (gastroesophageal reflux disease)     Hyperlipidemia     Hypertension     Obesity     Sleep apnea           Medications:     Current Outpatient Medications   Medication Sig Dispense Refill    acetaminophen (TYLENOL) 325 MG tablet Take 3 tablets (975 mg) by mouth every 8 hours  0    albuterol (PROAIR HFA/PROVENTIL HFA/VENTOLIN HFA) 108 (90 Base) MCG/ACT inhaler INHALE 2 PUFFS INTO THE LUNGS EVERY 6 HOURS AS NEEDED FOR SHORTNESS OF BREATH OR DIFFICULT BREATHING OR WHEEZING 8.5 g 3    Atorvastatin Calcium (LIPITOR PO) Take 20 mg by mouth every evening      candesartan (ATACAND) 32 MG tablet Take 16 mg by mouth every morning 1/2 of a 32 mg tablet daily in AM      cholecalciferol 50 MCG (2000 UT) tablet Take 1 tablet by mouth daily      clonazePAM (KLONOPIN) 1 MG tablet Take 2 mg (2 tablets) in the morning and take 1 mg in the afternoon.      docusate sodium (COLACE) 100 MG capsule Take 200 mg by mouth At Bedtime      famotidine (PEPCID) 20 MG tablet Take 1 tablet (20 mg) by mouth 2 times daily      ibuprofen (ADVIL/MOTRIN) 200 MG tablet Take 600 mg by mouth every 8 hours as needed for pain      lactulose (GENERLAC) 10 GM/15ML solution Take 20 g by mouth daily as needed      melatonin 5 MG tablet Take 10 mg by mouth At Bedtime      multivitamin w/minerals (THERA-VIT-M) tablet Take 1 tablet by mouth daily      NARCAN 4 MG/0.1ML nasal  spray Spray 0.1 mLs in nostril      Omega-3 Fatty Acids (OMEGA-3 FISH OIL PO) Take 1 g by mouth daily      oxyCODONE IR (ROXICODONE) 15 MG tablet TAKE 1 TABLET BY MOUTH EVERY 4 TO 6 HOURS AS NEEDED FOR PAIN. MAX 5 TABLETS DAILY. FOR CHRONIC PAIN FOR CHRONIC PAIN      PARoxetine (PAXIL-CR) 12.5 MG 24 hr tablet Take 12.5 mg by mouth every morning Takes with 37.5 mg tablet for total of 50 mg every morning.      PARoxetine (PAXIL-CR) 37.5 MG 24 hr tablet Take 37.5 mg by mouth every morning Takes with 12.5 mg tablet for total of 50 mg every morning.      PROPRANOLOL HCL PO Take 10 mg by mouth 2 times daily      RisperiDONE (RISPERDAL PO) Take 2 mg (2 tablets) in the morning and take 1 mg (1 tablet) in the evening.      sennosides (SENOKOT) 8.6 MG tablet Take 5 tablets by mouth At Bedtime      TRELEGY ELLIPTA 100-62.5-25 MCG/ACT oral inhaler INHALE 1 PUFF INTO THE LUNGS DAILY 60 each 3    morphine (MS CONTIN) 15 MG CR tablet Take 30 mg by mouth At Bedtime (Patient not taking: Reported on 2024)       No current facility-administered medications for this visit.      Social History :     Social History     Socioeconomic History    Marital status:      Spouse name: Not on file    Number of children: 2    Years of education: Not on file    Highest education level: Not on file   Occupational History    Occupation: retired   Tobacco Use    Smoking status: Former     Current packs/day: 0.00     Average packs/day: 0.3 packs/day for 0.5 years (0.1 ttl pk-yrs)     Types: Cigars, Cigarettes     Start date: 2022     Quit date: 2022     Years since quittin.6    Smokeless tobacco: Never   Vaping Use    Vaping status: Never Used   Substance and Sexual Activity    Alcohol use: Not Currently    Drug use: Never    Sexual activity: Not on file   Other Topics Concern    Not on file   Social History Narrative    Patient is  - has chronic low back pain     Social Determinants of Health     Financial Resource Strain:  Not on file   Food Insecurity: Not on file   Transportation Needs: Not on file   Physical Activity: Not on file   Stress: Not on file   Social Connections: Not on file   Interpersonal Safety: Not on file   Housing Stability: Not on file          Family History :     Family History   Problem Relation Age of Onset    Emphysema Mother     Lung Cancer Father     Anesthesia Reaction No family hx of     Thrombosis No family hx of        Review of Systems  A 12 point comprehensive review of systems was negative except as noted.        Objective:     There were no vitals taken for this visit.       Diagnostic tests:     PATHOLOGY 10/2/2023    A. LUNG, LEFT UPPER LOBE WEDGE, WEDGE RESECTION:  - Lung with subpleural fibroelastotic changes more consistent with apical cap  - Focus of organizing pneumonia  - Negative for malignancy     B, J. LUNG, LEFT UPPER LOBE WEDGE #2, WEDGE RESECTION AND COMPLETION S1-S3 SEGMENTECTOMY:  - INVASIVE ADENOCARCINOMA, ACINAR PREDOMINANT  - Tumor size: 2.1 cm in total; invasive component is 1.3 cm  - Viscera pleural invasion is not identified  - Lymphovascular invasion is not identified  - Bronchial, vascular and parenchymal margins are negative  - AJCC Pathologic Stage: pT1b, N0     C. LYMPH NODES, LEVEL 7:  - Multiple fragments of lymph nodes, negative for carcinoma (0/5)     D. LYMPH NODE, LEVEL 9L:  - One lymph node, negative for carcinoma (0/1)     E.  LYMPH NODE, LEVEL 10L:  - Three lymph nodes, negative for carcinoma (0/3)     F. LYMPH NODE, LEVEL 11L:  - Two lymph nodes, negative for carcinoma (0/2)     G. LYMPH NODE, LEVEL 5:  - Three lymph nodes, negative for carcinoma (0/3)     H. LUNG, LEFT UPPER LOBE WEDGE #3, WEDGE RESECTION:  - Lung tissue, negative for malignancy     I. LYMPH NODE, LEVEL 12L:  - One lymph node, negative for carcinoma (0/1)         PFTs:     FEV1/FVC is 56% and is reduced.  FEV1 is 2.03L (55%) predicted and is reduced.  FVC is 3.62L (75%) predicted and  normal.  There was no improvement in spirometry after a single inhaled dose of bronchodilator.  TLC is 8.65L (117%) predicted and is normal.  RV is 4.68L (189%) predicted and is increased.  DLCO is 27.07ml/min/hg (95%) predicted and is normal when it is corrected for hemoglobin.  Flow volume loops indicate scooping of the expiratory limbs.     IMAGES:     LOW DOSE LUNG CANCER SCREENING CT CHEST  LOCATION: [facility]  DATE/TIME: 6/14/2021 7:01 AM     INDICATION: Lung cancer screening. History of smoking. High risk patient with greater than 30 pack year smoking history.  COMPARISON: CT of the chest 2/28/2019 and 11/25/2015  FINDINGS:  NODULES: Punctate calcified granuloma posterolateral right lower lobe, definitively benign. No new or enlarging lung nodules.  LUNGS AND PLEURA: Foci of apical pleural parenchymal scarring are present and long-standing. Upper zone predominant paraseptal emphysema is also present. Central airway wall thickening. No bronchiectasis. A few foci of peripheral inspissated material   And subsegmental airways. No lung consolidation. No pleural space abnormality.  MEDIASTINUM: Cardiac chambers are normal in size and there is no pericardial effusion. No enlarged thoracic lymph nodes. Normal caliber thoracic aorta. Mild degenerative calcification of the aortic root.  CORONARY ARTERY CALCIFICATION: Mild.  LIMITED UPPER ABDOMEN: No actionable findings in the imaged upper abdomen.  MUSCULOSKELETAL: Degenerative osteophytes of the imaged lower cervical spine into much lesser extent the lower thoracic spine. No aggressive or destructive bone lesions.  IMPRESSION:  Negative for lung cancer screening purposes.    LOW DOSE LUNG CANCER SCREENING CT CHEST  LOCATION: [facility]  DATE: 7/19/2023   INDICATION: Lung cancer screening. History of smoking. High risk patient.  COMPARISON: CT of the chest 06/14/2021, the 28th 2019  FINDINGS:  NODULES: Mixed  attenuation but predominantly solid lesion is developed in the anterior left upper lobe measuring 17 x 20 mm (series 4, image 46) adjacent to an area of pleural-parenchymal scarring. A cluster of predominantly groundglass attenuation nodules has developed in the posterolateral right lower lobe the largest of which is 11 mm (series 4, image 98) as well as in the apex with largest in this location measuring 6 mm.  LUNGS AND PLEURA: Hyperinflation is edema. No lung fibrosis. Central airways are patent and normal caliber. No pleural effusion.  MEDIASTINUM: Cardiac chambers are normal in size. No pericardial effusion. Normal caliber thoracic aorta. Conventional arch anatomy. No lymphadenopathy. Decompressed esophagus.  CORONARY ARTERY CALCIFICATION: Mild.  LIMITED UPPER ABDOMEN: No actionable findings in the imaged upper abdomen.   MUSCULOSKELETAL: Disc space narrowing and small marginal osteophytes of the thoracic spine.  IMPRESSION:  1.  New predominantly solid nodule in the anterior apical left upper lobe adjacent to a focus of pleural parenchymal scarring measuring 17 x 20 mm.  2.  Two separate clustered foci of groundglass attenuation nodules right apex and inferior lateral right upper lobe, more consistent with inflammatory nodules.       PET ONCOLOGY (EYES TO THIGHS)  LOCATION: [facility]  DATE: 8/3/2023  INDICATION: Other nonspecific abnormal findings of lung field.  COMPARISON: CT chest 07/19/2023. CT chest 06/14/2021.  CONTRAST: None.  PET/CT FINDINGS: FDG avid spiculated nodule the anterior left upper lobe abutting the anterior pleura which measures 2.0 x 1.7 cm (SUV max 8.4), which is similar compared to 07/18/2023 and has grown since 06/14/2021 where it measured 1.1 x 0.8 cm.   Findings are suspicious for primary lung neoplasm without metastasis.  While there is near complete resolution of the areas of clustered groundglass nodules at the right apex beneath the apical scarring and in  the inferior lateral right upper lobe which are not FDG avid, there has been interval development of a new cluster of endobronchial nodules in the right lower lobe adjacent to the minor fissure (SUV max 3.2) since 07/19/2023. Findings suggest that these reflect a multifocal infectious/inflammatory process.  CT FINDINGS: Mild carotid artery bifurcation calcification. Mild coronary artery calcification. Left renal cyst. Scattered atherosclerotic calcifications. Pelvic phleboliths. Mild bladder wall thickening which may be due to underdistention. Multilevel degenerative changes of the spine                                          IMPRESSION:  1. Findings suspicious for left upper lobe primary lung neoplasm without metastasis.  2. Waxing/waning clusters of groundglass nodularity in the right lung are favored to represent a multifocal infectious/inflammatory process, although warrant continued imaging surveillance.    CT CHEST W CONTRAST  LOCATION: [facility]  DATE: 1/4/2024  INDICATION:  Malignant neoplasm of upper lobe of left lung (H)  COMPARISON: CT chest 09/20/2023  FINDINGS:   LUNGS AND PLEURA: Postoperative changes of the left upper lobe with a band of thickening along the fissure and near the surgical sutures. Small left pleural effusion. Groundglass nodular changes in the right upper lobe as on image 95 typical of chronic   inflammation and was seen previously.  MEDIASTINUM/AXILLAE: No lymphadenopathy. No thoracic aneurysm.  CORONARY ARTERY CALCIFICATION: Mild.  UPPER ABDOMEN: No significant finding.  MUSCULOSKELETAL: Unremarkable.                   IMPRESSION:   1.  Postoperative changes of the left lung with no evidence for recurrent disease.  2.  Chronic groundglass nodularity in the right lung compatible with inflammation.  3.  Small left pleural effusion.

## 2024-07-10 NOTE — PROGRESS NOTES
THORACIC SURGERY FOLLOW UP VISIT      I saw Mr. Frost in follow-up today. The clinical summary follows:     PREOP DIAGNOSIS   Lung nodule  PROCEDURE   Uniportal thoracoscopic left upper lobe segmentectomy (S1-S3), converted to thoracotomy, mediastinal lymph node dissection     DATE OF PROCEDURE  10/02/2023    HISTOPATHOLOGY   Invasive adenocarcinoma of left upper lobe of lung, tumor size 2.1 cm, invasive component 1.3 cm pT1bN0     INTRAOPERATIVE COMPLICATIONS  Thoracoscopy: difficult due to inflammation around broncho-vascular structures  Bleeding from the origin of A2, probably because a small hem-o-lock clip slipped partial of. Controlled with pressure, thoracotomy and proximal control of the LEFT main PA. No intraoperative hypotension from bleeding, no uncontrolled bleeding at any point.    POST OPERATIVE COMPLICATIONS  None    INTERVAL STUDIES  CT chest 07/11/2024:  1.  Areas of both new and improved nodularity in the lungs, suggesting waxing and waning infectious / inflammatory change. Recommend attention on short-term follow-up.     2.  Otherwise, no evidence of new or enlarging disease.    Past Medical History:   Diagnosis Date    Anxiety     Chronic back pain     Chronic kidney disease     Cigar smoker     COPD (chronic obstructive pulmonary disease) (H)     Depression     GERD (gastroesophageal reflux disease)     Hyperlipidemia     Hypertension     Obesity     Sleep apnea      Past Surgical History:   Procedure Laterality Date    APPENDECTOMY      CERVICAL LAMINECTOMY      partial x 2    IR LUMBAR EPIDURAL STEROID INJECTION  10/09/2002    THORACOSCOPIC LOBECTOMY LUNG Left 10/2/2023    Procedure: LEFT THORACOSCOPIC UPPER LOBE WEDGE RESECTION, SEGMENTECTOMY, LEFT THORACOTOMY, BRONCHOSCOPY;  Surgeon: Jorge Gotti MD;  Location: [facility] OR    TONSILLECTOMY        Social History     Socioeconomic History    Marital status:      Spouse name: Not on file    Number of children: 2    Years of  education: Not on file    Highest education level: Not on file   Occupational History    Occupation: retired   Tobacco Use    Smoking status: Former     Current packs/day: 0.00     Average packs/day: 0.3 packs/day for 0.5 years (0.1 ttl pk-yrs)     Types: Cigars, Cigarettes     Start date: 2022     Quit date: 2022     Years since quittin.6    Smokeless tobacco: Never   Vaping Use    Vaping status: Never Used   Substance and Sexual Activity    Alcohol use: Not Currently    Drug use: Never    Sexual activity: Not on file   Other Topics Concern    Not on file   Social History Narrative    Patient is  - has chronic low back pain     Social Determinants of Health     Financial Resource Strain: Not on file   Food Insecurity: Not on file   Transportation Needs: Not on file   Physical Activity: Not on file   Stress: Not on file   Social Connections: Not on file   Interpersonal Safety: Not on file   Housing Stability: Not on file       SUBJECTIVE   Alex is doing well. He denies recent illness or shortness of breath. He does have an occasional cough and coughs up clear stuff. He denies fevers, night sweats or chills.    OBJECTIVE  /84   Pulse 65   Wt 97.1 kg (214 lb)   SpO2 94%   BMI 29.85 kg/m       From a personal perspective, he and his wife, Brisa, are going to [place] at the end of the month.    IMPRESSION  Alex is a 63 year-old male status post Uniportal thoracoscopic left upper lobe segmentectomy (S1-S3), converted to thoracotomy, mediastinal lymph node dissection of a pT1bN0 (stage IA2) non small cell lung cancer. He is here for lung cancer surveillance.    I reviewed the CT results with Alex and Brisa. I assured them that these findings are most likely some sort of low grade infectious or inflammatory process. Given his lack of infectious symptoms, I do not recommend empiric antibiotics. We will get a short term follow up chest CT in 3 months.    PLAN  I spent 15 min on the date of  the encounter in chart review, patient visit, review of tests, documentation and/or discussion with other providers about the issues documented above. I reviewed the plan as follows:  Follow up with me in 3 months with chest CT prior  1. Necessary Tests & Appointments: chest CT (non contrast due to CKD)    All questions were answered and the patient and present family were in agreement with the plan.  I appreciate the opportunity to participate in the care of your patient and will keep you updated.  Sincerely,    NIKITA Barbosa CNS

## 2024-07-11 ENCOUNTER — OFFICE VISIT (OUTPATIENT)
Dept: PULMONOLOGY | Facility: CLINIC | Age: 64
End: 2024-07-11
Attending: CLINICAL NURSE SPECIALIST
Payer: COMMERCIAL

## 2024-07-11 ENCOUNTER — HOSPITAL ENCOUNTER (OUTPATIENT)
Dept: CT IMAGING | Facility: HOSPITAL | Age: 64
Discharge: HOME | End: 2024-07-11
Attending: CLINICAL NURSE SPECIALIST | Admitting: CLINICAL NURSE SPECIALIST
Payer: COMMERCIAL

## 2024-07-11 VITALS
OXYGEN SATURATION: 94 % | HEART RATE: 65 BPM | WEIGHT: 214 LBS | DIASTOLIC BLOOD PRESSURE: 84 MMHG | BODY MASS INDEX: 29.85 KG/M2 | SYSTOLIC BLOOD PRESSURE: 132 MMHG

## 2024-07-11 DIAGNOSIS — C34.12 MALIGNANT NEOPLASM OF UPPER LOBE OF LEFT LUNG (H): ICD-10-CM

## 2024-07-11 DIAGNOSIS — C34.12 MALIGNANT NEOPLASM OF UPPER LOBE OF LEFT LUNG (H): Primary | ICD-10-CM

## 2024-07-11 PROCEDURE — 71250 CT THORAX DX C-: CPT | Mod: ME

## 2024-07-11 PROCEDURE — 99213 OFFICE O/P EST LOW 20 MIN: CPT | Performed by: CLINICAL NURSE SPECIALIST

## 2024-07-15 ENCOUNTER — LAB REQUISITION (OUTPATIENT)
Dept: LAB | Facility: CLINIC | Age: 64
End: 2024-07-15

## 2024-07-15 DIAGNOSIS — N52.9 MALE ERECTILE DYSFUNCTION, UNSPECIFIED: ICD-10-CM

## 2024-07-15 PROCEDURE — 84403 ASSAY OF TOTAL TESTOSTERONE: CPT | Performed by: FAMILY MEDICINE

## 2024-07-15 PROCEDURE — 84270 ASSAY OF SEX HORMONE GLOBUL: CPT | Performed by: FAMILY MEDICINE

## 2024-07-17 LAB — SHBG SERPL-SCNC: 42 NMOL/L (ref 11–80)

## 2024-07-21 LAB
TESTOST FREE SERPL-MCNC: 3.22 NG/DL
TESTOST SERPL-MCNC: 197 NG/DL (ref 240–950)

## 2024-08-09 ENCOUNTER — LAB REQUISITION (OUTPATIENT)
Dept: LAB | Facility: CLINIC | Age: 64
End: 2024-08-09

## 2024-08-09 DIAGNOSIS — N52.9 MALE ERECTILE DYSFUNCTION, UNSPECIFIED: ICD-10-CM

## 2024-08-09 LAB — SHBG SERPL-SCNC: 30 NMOL/L (ref 11–80)

## 2024-08-09 PROCEDURE — 84270 ASSAY OF SEX HORMONE GLOBUL: CPT | Performed by: FAMILY MEDICINE

## 2024-08-09 PROCEDURE — 84403 ASSAY OF TOTAL TESTOSTERONE: CPT | Performed by: FAMILY MEDICINE

## 2024-08-13 LAB
TESTOST FREE SERPL-MCNC: 5.45 NG/DL
TESTOST SERPL-MCNC: 265 NG/DL (ref 240–950)

## 2024-09-06 ENCOUNTER — LAB REQUISITION (OUTPATIENT)
Dept: LAB | Facility: CLINIC | Age: 64
End: 2024-09-06

## 2024-09-06 DIAGNOSIS — R39.11 HESITANCY OF MICTURITION: ICD-10-CM

## 2024-09-06 LAB
ANION GAP SERPL CALCULATED.3IONS-SCNC: 13 MMOL/L (ref 7–15)
BUN SERPL-MCNC: 8.8 MG/DL (ref 8–23)
CALCIUM SERPL-MCNC: 9.3 MG/DL (ref 8.8–10.4)
CHLORIDE SERPL-SCNC: 99 MMOL/L (ref 98–107)
CREAT SERPL-MCNC: 1 MG/DL (ref 0.67–1.17)
EGFRCR SERPLBLD CKD-EPI 2021: 84 ML/MIN/1.73M2
GLUCOSE SERPL-MCNC: 108 MG/DL (ref 70–99)
HCO3 SERPL-SCNC: 23 MMOL/L (ref 22–29)
POTASSIUM SERPL-SCNC: 4.4 MMOL/L (ref 3.4–5.3)
PSA SERPL DL<=0.01 NG/ML-MCNC: 0.32 NG/ML (ref 0–4.5)
SODIUM SERPL-SCNC: 135 MMOL/L (ref 135–145)

## 2024-09-06 PROCEDURE — 80048 BASIC METABOLIC PNL TOTAL CA: CPT | Performed by: FAMILY MEDICINE

## 2024-09-06 PROCEDURE — 84153 ASSAY OF PSA TOTAL: CPT | Performed by: FAMILY MEDICINE

## 2024-09-06 PROCEDURE — 87086 URINE CULTURE/COLONY COUNT: CPT | Performed by: FAMILY MEDICINE

## 2024-09-07 LAB — BACTERIA UR CULT: NO GROWTH

## 2024-10-03 ENCOUNTER — HOSPITAL ENCOUNTER (OUTPATIENT)
Dept: CT IMAGING | Facility: HOSPITAL | Age: 64
Discharge: HOME | End: 2024-10-03
Attending: CLINICAL NURSE SPECIALIST | Admitting: CLINICAL NURSE SPECIALIST
Payer: COMMERCIAL

## 2024-10-03 ENCOUNTER — OFFICE VISIT (OUTPATIENT)
Dept: PULMONOLOGY | Facility: CLINIC | Age: 64
End: 2024-10-03
Payer: COMMERCIAL

## 2024-10-03 VITALS
BODY MASS INDEX: 29.71 KG/M2 | OXYGEN SATURATION: 98 % | DIASTOLIC BLOOD PRESSURE: 58 MMHG | SYSTOLIC BLOOD PRESSURE: 102 MMHG | HEART RATE: 60 BPM | WEIGHT: 213 LBS

## 2024-10-03 DIAGNOSIS — C34.12 MALIGNANT NEOPLASM OF UPPER LOBE OF LEFT LUNG (H): ICD-10-CM

## 2024-10-03 DIAGNOSIS — C34.12 MALIGNANT NEOPLASM OF UPPER LOBE OF LEFT LUNG (H): Primary | ICD-10-CM

## 2024-10-03 PROCEDURE — G1010 CDSM STANSON: HCPCS

## 2024-10-03 PROCEDURE — 71250 CT THORAX DX C-: CPT | Mod: ME

## 2024-10-03 PROCEDURE — 99214 OFFICE O/P EST MOD 30 MIN: CPT | Performed by: CLINICAL NURSE SPECIALIST

## 2024-10-03 RX ORDER — METHOCARBAMOL 500 MG/1
500 TABLET, FILM COATED ORAL 3 TIMES DAILY PRN
COMMUNITY
Start: 2024-08-15

## 2024-10-03 RX ORDER — NALDEMEDINE 0.2 MG/1
TABLET ORAL
COMMUNITY
Start: 2024-10-01

## 2024-10-03 RX ORDER — TADALAFIL 20 MG/1
1 TABLET ORAL
COMMUNITY
Start: 2024-08-23

## 2024-10-04 ENCOUNTER — TELEPHONE (OUTPATIENT)
Dept: SURGERY | Facility: CLINIC | Age: 64
End: 2024-10-04
Payer: COMMERCIAL

## 2024-10-04 NOTE — TELEPHONE ENCOUNTER
Spoke to Alex about the recommendations from Nodule Conference this morning and apologized for the delay in reaching him. Alex verbalized understanding of the recommendations and will await a call to schedule follow up.

## 2024-10-04 NOTE — TELEPHONE ENCOUNTER
[facility] Call Center    Phone Message    May a detailed message be left on voicemail: yes     Reason for Call: Other: Per pt provider was suppose to follow up with pt today @ 9:30 regarding his CT scan.      Action Taken: Other: Pulm      Travel Screening: Not Applicable     Date of Service:

## 2024-10-04 NOTE — PROGRESS NOTES
THORACIC SURGERY FOLLOW UP VISIT      I saw Mr. Frost in follow-up today. The clinical summary follows:     PREOP DIAGNOSIS   Lung nodule  PROCEDURE   Uniportal thoracoscopic left upper lobe segmentectomy (S1-S3) converted to thoracotomy, mediastinal lymph node dissection    DATE OF PROCEDURE  10/02/2023    HISTOPATHOLOGY   Invasive adenocarcinoma of left upper lobe of lung, tumor size 2.1 cm, invasive component 1.3 cm pT1bN0     COMPLICATIONS  Thoracoscopy: difficult due to inflammation around broncho-vascular structures  Bleeding from the origin of A2, probably because a small hem-o-lock clip slipped partial of. Controlled with pressure, thoracotomy and proximal control of the LEFT main PA. No intraoperative hypotension from bleeding, no uncontrolled bleeding at any point.    INTERVAL STUDIES  CT chest 10/03/2024:  1.  Emphysema with prior partial left upper lobectomy.  2.  New 12 mm right upper lobe nodule (series 3, image 82) when compared to July 2024. Recommend either short-term follow-up noncontrast chest CT in three months or PET/CT.  3.  No enlarged lymph nodes.        Past Medical History:   Diagnosis Date    Anxiety     Chronic back pain     Chronic kidney disease     Cigar smoker     COPD (chronic obstructive pulmonary disease) (H)     Depression     GERD (gastroesophageal reflux disease)     Hyperlipidemia     Hypertension     Obesity     Sleep apnea       Past Surgical History:   Procedure Laterality Date    APPENDECTOMY      CERVICAL LAMINECTOMY      partial x 2    IR LUMBAR EPIDURAL STEROID INJECTION  10/09/2002    THORACOSCOPIC LOBECTOMY LUNG Left 10/2/2023    Procedure: LEFT THORACOSCOPIC UPPER LOBE WEDGE RESECTION, SEGMENTECTOMY, LEFT THORACOTOMY, BRONCHOSCOPY;  Surgeon: Jroge Gotti MD;  Location: [facility] OR    TONSILLECTOMY        Social History     Socioeconomic History    Marital status:      Spouse name: Not on file    Number of children: 2    Years of education: Not on file     Highest education level: Not on file   Occupational History    Occupation: retired   Tobacco Use    Smoking status: Former     Current packs/day: 0.00     Average packs/day: 0.3 packs/day for 0.5 years (0.1 ttl pk-yrs)     Types: Cigars, Cigarettes     Start date: 2022     Quit date: 2022     Years since quittin.9     Passive exposure: Past    Smokeless tobacco: Never   Vaping Use    Vaping status: Never Used   Substance and Sexual Activity    Alcohol use: Not Currently    Drug use: Never    Sexual activity: Not on file   Other Topics Concern    Not on file   Social History Narrative    Patient is  - has chronic low back pain     Social Determinants of Health     Financial Resource Strain: Not on file   Food Insecurity: Not on file   Transportation Needs: Not on file   Physical Activity: Not on file   Stress: Not on file   Social Connections: Not on file   Interpersonal Safety: Not on file   Housing Stability: Not on file      SUBJECTIVE   Alex feels good. He denies cough, shortness of breath, fevers, night sweats, chills, fatigue or unexplained weight loss.    OBJECTIVE  /58 (BP Location: Left arm, Patient Position: Sitting, Cuff Size: Adult Large)   Pulse 60   Wt 96.6 kg (213 lb)   SpO2 98%   BMI 29.71 kg/m       From a personal perspective, he is here with his wife, Brisa    IMPRESSION   Alex is a 63 year-old male status post Uniportal thoracoscopic left upper lobe segmentectomy (S1-S3), converted to thoracotomy, mediastinal lymph node dissection of a pT1bN0 (stage IA2) non small cell lung cancer.  At his last lung cancer surveillance visit, there were areas of new nodularity in the lungs so I had him follow up with another short term CT.    I reviewed the CT report and images with Alex and Brisa and explained that the nodule I am more concerned about is the nodule in the right upper lobe. This is in an area that was previously ground glass and is now nearly completely solid. It is also  lobulated. Alex has expressed a desire to avoid more surgery but would be open to radiation therapy if this nodule represents a new lung cancer. I will discuss his case at our multi disciplinary nodule conference tomorrow to get the opinions of my colleagues in Interventional Pulmonology, Interventional Radiology and Thoracic Surgery. I will call Alex tomorrow with the group consensus.     PLAN  I spent 25 min on the date of the encounter in chart review, patient visit, review of tests, documentation and/or discussion with other providers about the issues documented above. I reviewed the plan as follows:  Nodule Conference discussion tomorrow. Next steps TBD.  All questions were answered and the patient and present family were in agreement with the plan.  I appreciate the opportunity to participate in the care of your patient and will keep you updated.  Sincerely,    NIKITA Barbosa CNS

## 2024-10-28 ENCOUNTER — LAB REQUISITION (OUTPATIENT)
Dept: LAB | Facility: CLINIC | Age: 64
End: 2024-10-28

## 2024-10-28 LAB — SHBG SERPL-SCNC: 25 NMOL/L (ref 11–80)

## 2024-10-28 PROCEDURE — 84270 ASSAY OF SEX HORMONE GLOBUL: CPT | Performed by: FAMILY MEDICINE

## 2024-10-28 PROCEDURE — 84403 ASSAY OF TOTAL TESTOSTERONE: CPT | Performed by: FAMILY MEDICINE

## 2024-10-30 LAB
TESTOST FREE SERPL-MCNC: 5.49 NG/DL
TESTOST SERPL-MCNC: 244 NG/DL (ref 240–950)

## 2024-10-31 ENCOUNTER — TELEPHONE (OUTPATIENT)
Dept: PULMONOLOGY | Facility: CLINIC | Age: 64
End: 2024-10-31
Payer: COMMERCIAL

## 2024-10-31 DIAGNOSIS — J44.9 CHRONIC OBSTRUCTIVE PULMONARY DISEASE, UNSPECIFIED COPD TYPE (H): ICD-10-CM

## 2024-10-31 NOTE — TELEPHONE ENCOUNTER
[facility] Call Center    Phone Message    May a detailed message be left on voicemail: yes     Reason for Call: Other: Alex is calling because he needs more Trellegy, per Alex the pharmacy has requested a refill but it has not gotten a response. Last fill was 06/2024      Action Taken: Other: Pulm    Travel Screening: Not Applicable     Date of Service:

## 2024-11-01 RX ORDER — FLUTICASONE FUROATE, UMECLIDINIUM BROMIDE AND VILANTEROL TRIFENATATE 100; 62.5; 25 UG/1; UG/1; UG/1
1 POWDER RESPIRATORY (INHALATION) DAILY
Qty: 60 EACH | Refills: 3 | Status: SHIPPED | OUTPATIENT
Start: 2024-11-01

## 2024-12-18 ENCOUNTER — VIRTUAL VISIT (OUTPATIENT)
Dept: PULMONOLOGY | Facility: CLINIC | Age: 64
End: 2024-12-18
Attending: INTERNAL MEDICINE
Payer: COMMERCIAL

## 2024-12-18 DIAGNOSIS — C34.12 MALIGNANT NEOPLASM OF UPPER LOBE OF LEFT LUNG (H): ICD-10-CM

## 2024-12-18 DIAGNOSIS — R91.8 GROUND GLASS OPACITY PRESENT ON IMAGING OF LUNG: ICD-10-CM

## 2024-12-18 DIAGNOSIS — J44.9 CHRONIC OBSTRUCTIVE PULMONARY DISEASE, UNSPECIFIED COPD TYPE (H): ICD-10-CM

## 2024-12-18 DIAGNOSIS — R91.8 PULMONARY NODULES: ICD-10-CM

## 2024-12-18 PROCEDURE — G2211 COMPLEX E/M VISIT ADD ON: HCPCS | Mod: 95 | Performed by: INTERNAL MEDICINE

## 2024-12-18 PROCEDURE — 99213 OFFICE O/P EST LOW 20 MIN: CPT | Mod: 95 | Performed by: INTERNAL MEDICINE

## 2024-12-18 ASSESSMENT — PAIN SCALES - GENERAL: PAINLEVEL_OUTOF10: MODERATE PAIN (4)

## 2024-12-18 NOTE — NURSING NOTE
Current patient location: [address] [place] [id number]    Is the patient currently in the state of [place]? YES    Visit mode:VIDEO    If the visit is dropped, the patient can be reconnected by:VIDEO VISIT: Text to cell phone:   Telephone Information:   Mobile [phone]       Will anyone else be joining the visit? NO  (If patient encounters technical issues they should call [phone]    Are changes needed to the allergy or medication list? Pt stated no changes to allergies and Pt stated no med changes    Are refills needed on medications prescribed by this physician? Discuss with provider    Rooming Documentation:  Questionnaire(s) completed    Reason for visit: RECHECK    Tyree VILLARREALF

## 2024-12-18 NOTE — PROGRESS NOTES
Virtual Visit Details    Type of service:  Video Visit   Video Start Time: 1:03 PM  Video End Time:1:23 PM    Originating Location (pt. Location): Home    Distant Location (provider location):  On-site  Platform used for Video Visit: Sheila

## 2024-12-18 NOTE — PATIENT INSTRUCTIONS
Continue TRELEGY, once a day, rinse your mouth after each use  Albuterol HFA two puffs every 6 hours as needed, use it before activities   Avoid eating close to bedtime  Raise the head of the bed  Pepcid QHS  Follow up chest CT scan April 2025  Follow up in 4-5 months

## 2024-12-18 NOTE — PROGRESS NOTES
VIDEO PULMONARY NOTE        Assessment:      Stage IB lung cancer  PET 8/3/2023 (+) mixed attenuation but predominantly solid lesion has developed in the anterior left upper lobe measuring 17 x 20 mm when comparing CT scans 2021 and July 2023. No abnormal mediastinal adenopathy.   Evaluated by thoracic surgery , underwent left upper lobe wedge resection on 10/2/2023. Pathology (+) adenocarcinoma. Mediastinal node biopsies were negative.   Recent chest CT scan 10/3/2024 showed stable suspected nodular-like scarring abutting the remnant left upper lobe bronchus, new 12 mm right upper lobe nodule, several new groundglass benign-appearing 2 to 3 mm micronodules.   Follow up chest CT scan    Waxing and waning lung opacities and nodules  Recent chest CT scan 10/3/2024 showed new 12 mm RUL nodule.   Follow up chest CT scan in 6 months    COPD  Significant tobacco use. Chest CT scan showed upper zone predominant paraseptal emphysema   PFTs showed a moderate obstructive lung disease, air trapping, normal diffusion capacity   Continue ICS/LABA/LAMA. Albuterol HFA as needed  GERD  Avoid eating close to bedtime  Raise the head of the bed  Pepcid QHS     Plan:      Continue TRELEGY, once a day, rinse your mouth after each use  Albuterol HFA two puffs every 6 hours as needed, use it before activities   Avoid eating close to bedtime  Raise the head of the bed  Pepcid QHS  Follow up chest CT scan April 2025  Follow up in 4-5 months      Video call visit time: 20 minutes     Elan López  Pulmonary / Critical Care  12/18/24       CC:     Chief Complaint   Patient presents with    RECHECK      HPI:      Alex Frost is a 63 year old male who presents for follow up appointment.  Patient has history of HTN, COPD, lung cancer , Stage IB s/p wedge resection of ALYSA nodule 10/2/23 path (+) adenocarcinoma, negative mediastinal adenopathy, GGO/lung nodules, GERD.    Reports doing well, participated in pulmonary rehab,  activity level has improved.   Occasional wheezes and mild dry cough. No  hemoptysis.   Denies fever, chills or night sweats.   Uses LABA/ICS/LAMA inhaler daily and albuterol HFA three times a month.   Denies chest pain, orthopnea, PND, or swelling of LEs.  Denies sleeping problems, feels refresh in AM.   Reports mild acid reflux, on pepcid twice a day.  Tobacco user < 1 ppd for 40 years plus, quit over a year ago.     Past Medical History :     Past Medical History:   Diagnosis Date    Anxiety     Chronic back pain     Chronic kidney disease     Cigar smoker     COPD (chronic obstructive pulmonary disease) (H)     Depression     GERD (gastroesophageal reflux disease)     Hyperlipidemia     Hypertension     Obesity     Sleep apnea           Medications:     Current Outpatient Medications   Medication Sig Dispense Refill    acetaminophen (TYLENOL) 325 MG tablet Take 3 tablets (975 mg) by mouth every 8 hours  0    albuterol (PROAIR HFA/PROVENTIL HFA/VENTOLIN HFA) 108 (90 Base) MCG/ACT inhaler INHALE 2 PUFFS INTO THE LUNGS EVERY 6 HOURS AS NEEDED FOR SHORTNESS OF BREATH OR DIFFICULT BREATHING OR WHEEZING 8.5 g 3    Atorvastatin Calcium (LIPITOR PO) Take 20 mg by mouth every evening      candesartan (ATACAND) 32 MG tablet Take 16 mg by mouth every morning      cholecalciferol 50 MCG (2000 UT) tablet Take 1 tablet by mouth daily      clonazePAM (KLONOPIN) 1 MG tablet Take 1 mg by mouth 3 times daily as needed Take 2 mg (2 tablets) in the morning and take 1 mg in the afternoon.      docusate sodium (COLACE) 100 MG capsule Take 200 mg by mouth At Bedtime      famotidine (PEPCID) 20 MG tablet Take 1 tablet (20 mg) by mouth 2 times daily      lactulose (GENERLAC) 10 GM/15ML solution Take 20 g by mouth daily as needed      melatonin 5 MG tablet Take 10 mg by mouth At Bedtime      methocarbamol (ROBAXIN) 500 MG tablet Take 500 mg by mouth 3 times daily as needed for muscle spasms.      multivitamin w/minerals (THERA-VIT-M)  tablet Take 1 tablet by mouth daily      Omega-3 Fatty Acids (OMEGA-3 FISH OIL PO) Take 1 g by mouth daily      oxyCODONE IR (ROXICODONE) 10 MG tablet Take 10 mg by mouth every 4 hours as needed      PARoxetine (PAXIL-CR) 25 MG 24 hr tablet Take 25 mg by mouth every morning      PARoxetine (PAXIL-CR) 37.5 MG 24 hr tablet Take 37.5 mg by mouth every morning Takes with 12.5 mg tablet for total of 50 mg every morning.      PROPRANOLOL HCL PO Take 10 mg by mouth 2 times daily      RisperiDONE (RISPERDAL PO) Take 2 mg (2 tablets) in the morning and take 1 mg (1 tablet) in the evening.      sennosides (SENOKOT) 8.6 MG tablet Take 5 tablets by mouth At Bedtime      SYMPROIC 0.2 MG TABS       tadalafil (CIALIS) 20 MG tablet Take 1 tablet by mouth daily at 2 pm.      TRELEGY ELLIPTA 100-62.5-25 MCG/ACT oral inhaler Inhale 1 puff into the lungs daily. 60 each 3    NARCAN 4 MG/0.1ML nasal spray Spray 0.1 mLs in nostril       No current facility-administered medications for this visit.      Social History :     Social History     Socioeconomic History    Marital status:      Spouse name: Not on file    Number of children: 2    Years of education: Not on file    Highest education level: Not on file   Occupational History    Occupation: retired   Tobacco Use    Smoking status: Former     Current packs/day: 0.00     Average packs/day: 0.3 packs/day for 0.5 years (0.1 ttl pk-yrs)     Types: Cigars, Cigarettes     Start date: 2022     Quit date: 2022     Years since quittin.1     Passive exposure: Past    Smokeless tobacco: Never   Vaping Use    Vaping status: Never Used   Substance and Sexual Activity    Alcohol use: Not Currently    Drug use: Never    Sexual activity: Not on file   Other Topics Concern    Not on file   Social History Narrative    Patient is  - has chronic low back pain     Social Drivers of Health     Financial Resource Strain: Not on file   Food Insecurity: Not on file   Transportation  Needs: Not on file   Physical Activity: Not on file   Stress: Not on file   Social Connections: Not on file   Interpersonal Safety: Not on file   Housing Stability: Not on file          Family History :     Family History   Problem Relation Age of Onset    Emphysema Mother     Lung Cancer Father     Anesthesia Reaction No family hx of     Thrombosis No family hx of        Review of Systems  A 12 point comprehensive review of systems was negative except as noted.        Objective:     VIRTUAL VISIT        Diagnostic tests:     PATHOLOGY 10/2/2023    A. LUNG, LEFT UPPER LOBE WEDGE, WEDGE RESECTION:  - Lung with subpleural fibroelastotic changes more consistent with apical cap  - Focus of organizing pneumonia  - Negative for malignancy     B, J. LUNG, LEFT UPPER LOBE WEDGE #2, WEDGE RESECTION AND COMPLETION S1-S3 SEGMENTECTOMY:  - INVASIVE ADENOCARCINOMA, ACINAR PREDOMINANT  - Tumor size: 2.1 cm in total; invasive component is 1.3 cm  - Viscera pleural invasion is not identified  - Lymphovascular invasion is not identified  - Bronchial, vascular and parenchymal margins are negative  - AJCC Pathologic Stage: pT1b, N0     C. LYMPH NODES, LEVEL 7:  - Multiple fragments of lymph nodes, negative for carcinoma (0/5)     D. LYMPH NODE, LEVEL 9L:  - One lymph node, negative for carcinoma (0/1)     E.  LYMPH NODE, LEVEL 10L:  - Three lymph nodes, negative for carcinoma (0/3)     F. LYMPH NODE, LEVEL 11L:  - Two lymph nodes, negative for carcinoma (0/2)     G. LYMPH NODE, LEVEL 5:  - Three lymph nodes, negative for carcinoma (0/3)     H. LUNG, LEFT UPPER LOBE WEDGE #3, WEDGE RESECTION:  - Lung tissue, negative for malignancy     I. LYMPH NODE, LEVEL 12L:  - One lymph node, negative for carcinoma (0/1)         PFTs:     FEV1/FVC is 56% and is reduced.  FEV1 is 2.03L (55%) predicted and is reduced.  FVC is 3.62L (75%) predicted and normal.  There was no improvement in spirometry after a single inhaled dose of bronchodilator.  TLC  is 8.65L (117%) predicted and is normal.  RV is 4.68L (189%) predicted and is increased.  DLCO is 27.07ml/min/hg (95%) predicted and is normal when it is corrected for hemoglobin.  Flow volume loops indicate scooping of the expiratory limbs.     IMAGES:     LOW DOSE LUNG CANCER SCREENING CT CHEST  LOCATION: [facility]  DATE/TIME: 6/14/2021 7:01 AM     INDICATION: Lung cancer screening. History of smoking. High risk patient with greater than 30 pack year smoking history.  COMPARISON: CT of the chest 2/28/2019 and 11/25/2015  FINDINGS:  NODULES: Punctate calcified granuloma posterolateral right lower lobe, definitively benign. No new or enlarging lung nodules.  LUNGS AND PLEURA: Foci of apical pleural parenchymal scarring are present and long-standing. Upper zone predominant paraseptal emphysema is also present. Central airway wall thickening. No bronchiectasis. A few foci of peripheral inspissated material   And subsegmental airways. No lung consolidation. No pleural space abnormality.  MEDIASTINUM: Cardiac chambers are normal in size and there is no pericardial effusion. No enlarged thoracic lymph nodes. Normal caliber thoracic aorta. Mild degenerative calcification of the aortic root.  CORONARY ARTERY CALCIFICATION: Mild.  LIMITED UPPER ABDOMEN: No actionable findings in the imaged upper abdomen.  MUSCULOSKELETAL: Degenerative osteophytes of the imaged lower cervical spine into much lesser extent the lower thoracic spine. No aggressive or destructive bone lesions.  IMPRESSION:  Negative for lung cancer screening purposes.    LOW DOSE LUNG CANCER SCREENING CT CHEST  LOCATION: [facility]  DATE: 7/19/2023   INDICATION: Lung cancer screening. History of smoking. High risk patient.  COMPARISON: CT of the chest 06/14/2021, the 28th 2019  FINDINGS:  NODULES: Mixed attenuation but predominantly solid lesion is developed in the anterior left upper lobe measuring 17 x 20 mm  (series 4, image 46) adjacent to an area of pleural-parenchymal scarring. A cluster of predominantly groundglass attenuation nodules has developed in the posterolateral right lower lobe the largest of which is 11 mm (series 4, image 98) as well as in the apex with largest in this location measuring 6 mm.  LUNGS AND PLEURA: Hyperinflation is edema. No lung fibrosis. Central airways are patent and normal caliber. No pleural effusion.  MEDIASTINUM: Cardiac chambers are normal in size. No pericardial effusion. Normal caliber thoracic aorta. Conventional arch anatomy. No lymphadenopathy. Decompressed esophagus.  CORONARY ARTERY CALCIFICATION: Mild.  LIMITED UPPER ABDOMEN: No actionable findings in the imaged upper abdomen.   MUSCULOSKELETAL: Disc space narrowing and small marginal osteophytes of the thoracic spine.  IMPRESSION:  1.  New predominantly solid nodule in the anterior apical left upper lobe adjacent to a focus of pleural parenchymal scarring measuring 17 x 20 mm.  2.  Two separate clustered foci of groundglass attenuation nodules right apex and inferior lateral right upper lobe, more consistent with inflammatory nodules.       PET ONCOLOGY (EYES TO THIGHS)  LOCATION: [facility]  DATE: 8/3/2023  INDICATION: Other nonspecific abnormal findings of lung field.  COMPARISON: CT chest 07/19/2023. CT chest 06/14/2021.  CONTRAST: None.  PET/CT FINDINGS: FDG avid spiculated nodule the anterior left upper lobe abutting the anterior pleura which measures 2.0 x 1.7 cm (SUV max 8.4), which is similar compared to 07/18/2023 and has grown since 06/14/2021 where it measured 1.1 x 0.8 cm.   Findings are suspicious for primary lung neoplasm without metastasis.  While there is near complete resolution of the areas of clustered groundglass nodules at the right apex beneath the apical scarring and in the inferior lateral right upper lobe which are not FDG avid, there has been interval development of a new  cluster of endobronchial nodules in the right lower lobe adjacent to the minor fissure (SUV max 3.2) since 07/19/2023. Findings suggest that these reflect a multifocal infectious/inflammatory process.  CT FINDINGS: Mild carotid artery bifurcation calcification. Mild coronary artery calcification. Left renal cyst. Scattered atherosclerotic calcifications. Pelvic phleboliths. Mild bladder wall thickening which may be due to underdistention. Multilevel degenerative changes of the spine                                          IMPRESSION:  1. Findings suspicious for left upper lobe primary lung neoplasm without metastasis.  2. Waxing/waning clusters of groundglass nodularity in the right lung are favored to represent a multifocal infectious/inflammatory process, although warrant continued imaging surveillance.    CT CHEST W CONTRAST  LOCATION: [facility]  DATE: 1/4/2024  INDICATION:  Malignant neoplasm of upper lobe of left lung (H)  COMPARISON: CT chest 09/20/2023  FINDINGS:   LUNGS AND PLEURA: Postoperative changes of the left upper lobe with a band of thickening along the fissure and near the surgical sutures. Small left pleural effusion. Groundglass nodular changes in the right upper lobe as on image 95 typical of chronic   inflammation and was seen previously.  MEDIASTINUM/AXILLAE: No lymphadenopathy. No thoracic aneurysm.  CORONARY ARTERY CALCIFICATION: Mild.  UPPER ABDOMEN: No significant finding.  MUSCULOSKELETAL: Unremarkable.                   IMPRESSION:   1.  Postoperative changes of the left lung with no evidence for recurrent disease.  2.  Chronic groundglass nodularity in the right lung compatible with inflammation.  3.  Small left pleural effusion.    CT CHEST W/O CONTRAST  LOCATION: [facility]  DATE: 7/11/2024  INDICATION:  Malignant neoplasm of upper lobe of left lung (H).  COMPARISON: 1/4/2024.  FINDINGS:   LUNGS AND PLEURA: Redemonstrated surgical  change and adjacent bandlike opacity along the left upper lobe. Some areas of inflammatory change seen on the January 2024 exam have improved, though a few new nodules are present, including clustered groundglass and 2 to 3 mm micronodules in the anterior right upper lobe (series 3, image 80-90) and 5 mm groundglass nodule adjacent to the right major fissure (image 95). Improved but minimally persistent left pleural fluid or thickening.  MEDIASTINUM/AXILLAE: No adenopathy.  CORONARY ARTERY CALCIFICATION: Mild to moderate.  UPPER ABDOMEN: No significant finding.  MUSCULOSKELETAL: No focal bony lesion.                                        IMPRESSION:   1.  Areas of both new and improved nodularity in the lungs, suggesting waxing and waning infectious / inflammatory change. Recommend attention on short-term follow-up.  2.  Otherwise, no evidence of new or enlarging disease.    CT CHEST W/O CONTRAST  LOCATION: [facility]  DATE: 10/3/2024  INDICATION: Malignant neoplasm of upper lobe of left lung (H).  COMPARISON: Chest CT from 07/11/2024.  FINDINGS:   LUNGS AND PLEURA:   Emphysema with prior partial left upper lobectomy. Stable appearance of suspected nodular-like scarring abutting the remnant left upper lobe bronchus (series 3, image 93).   New 12 mm right upper lobe nodule (series 3, image 82) warrants short-term interval follow-up. Several new groundglass benign-appearing 2 to 3 mm micronodules are partially annotated on series 3.   No pleural effusion or pleural nodules. No endotracheal or endobronchial nodules.  MEDIASTINUM/AXILLAE: No lymphadenopathy. No thoracic aortic aneurysm.  CORONARY ARTERY CALCIFICATION: Three-vessel coronary artery disease.  UPPER ABDOMEN: No significant finding.   MUSCULOSKELETAL: Unremarkable.  IMPRESSION:   1.  Emphysema with prior partial left upper lobectomy.  2.  New 12 mm right upper lobe nodule (series 3, image 82) when compared to July 2024. Recommend  either short-term follow-up noncontrast chest CT in three months or PET/CT.  3.  No enlarged lymph nodes.

## 2024-12-31 ENCOUNTER — HOSPITAL ENCOUNTER (OUTPATIENT)
Dept: MRI IMAGING | Facility: HOSPITAL | Age: 64
Discharge: HOME | End: 2024-12-31
Attending: PHYSICIAN ASSISTANT
Payer: COMMERCIAL

## 2024-12-31 DIAGNOSIS — M51.369 OTHER INTERVERTEBRAL DISC DEGENERATION, LUMBAR REGION WITHOUT MENTION OF LUMBAR BACK PAIN OR LOWER EXTREMITY PAIN: ICD-10-CM

## 2024-12-31 DIAGNOSIS — M96.1 POSTLAMINECTOMY SYNDROME, NOT ELSEWHERE CLASSIFIED: ICD-10-CM

## 2024-12-31 PROCEDURE — 72148 MRI LUMBAR SPINE W/O DYE: CPT

## 2025-01-28 DIAGNOSIS — J44.9 CHRONIC OBSTRUCTIVE PULMONARY DISEASE, UNSPECIFIED COPD TYPE (H): ICD-10-CM

## 2025-01-28 RX ORDER — FLUTICASONE FUROATE, UMECLIDINIUM BROMIDE AND VILANTEROL TRIFENATATE 100; 62.5; 25 UG/1; UG/1; UG/1
1 POWDER RESPIRATORY (INHALATION) DAILY
Qty: 60 EACH | Refills: 11 | Status: SHIPPED | OUTPATIENT
Start: 2025-01-28

## 2025-02-01 ENCOUNTER — HEALTH MAINTENANCE LETTER (OUTPATIENT)
Age: 65
End: 2025-02-01

## 2025-03-25 ENCOUNTER — LAB REQUISITION (OUTPATIENT)
Dept: LAB | Facility: CLINIC | Age: 65
End: 2025-03-25

## 2025-03-25 PROCEDURE — 82565 ASSAY OF CREATININE: CPT

## 2025-03-25 PROCEDURE — 82310 ASSAY OF CALCIUM: CPT

## 2025-03-25 PROCEDURE — 83690 ASSAY OF LIPASE: CPT

## 2025-03-26 LAB
ALBUMIN SERPL BCG-MCNC: 4.6 G/DL (ref 3.5–5.2)
ALP SERPL-CCNC: 52 U/L (ref 40–150)
ALT SERPL W P-5'-P-CCNC: 17 U/L (ref 0–70)
ANION GAP SERPL CALCULATED.3IONS-SCNC: 12 MMOL/L (ref 7–15)
AST SERPL W P-5'-P-CCNC: 21 U/L (ref 0–45)
BILIRUB SERPL-MCNC: 0.4 MG/DL
BUN SERPL-MCNC: 5.5 MG/DL (ref 8–23)
CALCIUM SERPL-MCNC: 9.5 MG/DL (ref 8.8–10.4)
CHLORIDE SERPL-SCNC: 98 MMOL/L (ref 98–107)
CREAT SERPL-MCNC: 0.91 MG/DL (ref 0.67–1.17)
EGFRCR SERPLBLD CKD-EPI 2021: >90 ML/MIN/1.73M2
GLUCOSE SERPL-MCNC: 119 MG/DL (ref 70–99)
HCO3 SERPL-SCNC: 23 MMOL/L (ref 22–29)
LIPASE SERPL-CCNC: 19 U/L (ref 13–60)
POTASSIUM SERPL-SCNC: 3.7 MMOL/L (ref 3.4–5.3)
PROT SERPL-MCNC: 7.1 G/DL (ref 6.4–8.3)
SODIUM SERPL-SCNC: 133 MMOL/L (ref 135–145)

## 2025-04-01 PROBLEM — F17.200 NICOTINE DEPENDENCE, UNSPECIFIED, UNCOMPLICATED: Status: ACTIVE | Noted: 2021-05-21

## 2025-04-03 ENCOUNTER — HOSPITAL ENCOUNTER (OUTPATIENT)
Dept: CT IMAGING | Facility: HOSPITAL | Age: 65
Discharge: HOME | End: 2025-04-03
Attending: CLINICAL NURSE SPECIALIST
Payer: COMMERCIAL

## 2025-04-03 ENCOUNTER — OFFICE VISIT (OUTPATIENT)
Dept: PULMONOLOGY | Facility: CLINIC | Age: 65
End: 2025-04-03
Payer: COMMERCIAL

## 2025-04-03 VITALS
HEART RATE: 62 BPM | OXYGEN SATURATION: 96 % | DIASTOLIC BLOOD PRESSURE: 70 MMHG | SYSTOLIC BLOOD PRESSURE: 104 MMHG | WEIGHT: 204.8 LBS | BODY MASS INDEX: 28.56 KG/M2

## 2025-04-03 DIAGNOSIS — C34.12 MALIGNANT NEOPLASM OF UPPER LOBE OF LEFT LUNG (H): ICD-10-CM

## 2025-04-03 DIAGNOSIS — C34.12 MALIGNANT NEOPLASM OF UPPER LOBE OF LEFT LUNG (H): Primary | ICD-10-CM

## 2025-04-03 PROCEDURE — 71250 CT THORAX DX C-: CPT

## 2025-04-03 RX ORDER — PROPRANOLOL HYDROCHLORIDE 10 MG/1
2 TABLET ORAL
COMMUNITY
Start: 2024-11-13

## 2025-04-03 RX ORDER — ATORVASTATIN CALCIUM 20 MG/1
1 TABLET, FILM COATED ORAL
COMMUNITY
Start: 2025-03-05

## 2025-04-03 NOTE — PROGRESS NOTES
THORACIC SURGERY FOLLOW UP VISIT      I saw Mr. Frost in follow-up today. The clinical summary follows:     PREOP DIAGNOSIS   Lung nodule  PROCEDURE   Uniportal thoracoscopic left upper lobe segmentectomy (S1-S3) converted to thoracotomy, mediastinal lymph node dissection    DATE OF PROCEDURE  10/02/2023    HISTOPATHOLOGY   Invasive adenocarcinoma of left upper lobe of lung, tumor size 2.1 cm, invasive component 1.3 cm pT1bN0     COMPLICATIONS  Thoracoscopy: difficult due to inflammation around broncho-vascular structures  Bleeding from the origin of A2, probably because a small hem-o-lock clip slipped partial of. Controlled with pressure, thoracotomy and proximal control of the LEFT main PA. No intraoperative hypotension from bleeding, no uncontrolled bleeding at any point.    INTERVAL STUDIES  CT chest 04/03/2025: final report pending at time of clinic visit. To my review the RUL nodule appears stable however, there appears to be more inflammation surrounding this.        Past Medical History:   Diagnosis Date    Anxiety     Chronic back pain     Chronic kidney disease     Cigar smoker     COPD (chronic obstructive pulmonary disease) (H)     Depression     GERD (gastroesophageal reflux disease)     Hyperlipidemia     Hypertension     Obesity     Sleep apnea       Past Surgical History:   Procedure Laterality Date    APPENDECTOMY      CERVICAL LAMINECTOMY      partial x 2    IR LUMBAR EPIDURAL STEROID INJECTION  10/09/2002    THORACOSCOPIC LOBECTOMY LUNG Left 10/2/2023    Procedure: LEFT THORACOSCOPIC UPPER LOBE WEDGE RESECTION, SEGMENTECTOMY, LEFT THORACOTOMY, BRONCHOSCOPY;  Surgeon: Jorge Gotti MD;  Location: [facility] OR    TONSILLECTOMY        Social History     Socioeconomic History    Marital status:      Spouse name: Not on file    Number of children: 2    Years of education: Not on file    Highest education level: Not on file   Occupational History    Occupation: retired   Tobacco Use     Smoking status: Former     Current packs/day: 0.00     Average packs/day: 0.3 packs/day for 0.5 years (0.1 ttl pk-yrs)     Types: Cigars, Cigarettes     Start date: 2022     Quit date: 2022     Years since quittin.4     Passive exposure: Past    Smokeless tobacco: Never   Vaping Use    Vaping status: Never Used   Substance and Sexual Activity    Alcohol use: Not Currently    Drug use: Never    Sexual activity: Not on file   Other Topics Concern    Not on file   Social History Narrative    Patient is  - has chronic low back pain     Social Drivers of Health     Financial Resource Strain: Not on file   Food Insecurity: Not on file   Transportation Needs: Not on file   Physical Activity: Not on file   Stress: Not on file   Social Connections: Not on file   Interpersonal Safety: Not on file   Housing Stability: Not on file      SUBJECTIVE   Alex feels good. He denies cough, shortness of breath, fevers, night sweats, chills, fatigue or unexplained weight loss.    OBJECTIVE  /70 (BP Location: Left arm, Patient Position: Sitting, Cuff Size: Adult Large)   Pulse 62   Wt 92.9 kg (204 lb 12.8 oz)   SpO2 96%   BMI 28.56 kg/m       From a personal perspective, he is here with his wife, Brisa Johnson is a 64 year-old male status post Uniportal thoracoscopic left upper lobe segmentectomy (S1-S3), converted to thoracotomy, mediastinal lymph node dissection of a pT1bN0 (stage IA2) non small cell lung cancer.  At his last lung cancer surveillance visit, there were areas of new nodularity in the lungs so I had him follow up with another short term CT.    I will await the final radiology report and will give Alex a call once I have been able to review these results.     PLAN  I spent 15 min on the date of the encounter in chart review, patient visit, review of tests, documentation and/or discussion with other providers about the issues documented above. I reviewed the plan as follows:  Follow up  TBD pending final radiology report from today's chest CT  All questions were answered and the patient and present family were in agreement with the plan.  I appreciate the opportunity to participate in the care of your patient and will keep you updated.  Sincerely,    Maria Victoria Miller, NIKITA CNS

## 2025-04-30 ENCOUNTER — LAB REQUISITION (OUTPATIENT)
Dept: LAB | Facility: CLINIC | Age: 65
End: 2025-04-30
Payer: COMMERCIAL

## 2025-04-30 DIAGNOSIS — I10 ESSENTIAL (PRIMARY) HYPERTENSION: ICD-10-CM

## 2025-04-30 DIAGNOSIS — E29.1 TESTICULAR HYPOFUNCTION: ICD-10-CM

## 2025-04-30 DIAGNOSIS — E78.2 MIXED HYPERLIPIDEMIA: ICD-10-CM

## 2025-04-30 DIAGNOSIS — Z12.5 ENCOUNTER FOR SCREENING FOR MALIGNANT NEOPLASM OF PROSTATE: ICD-10-CM

## 2025-04-30 LAB
ALBUMIN SERPL BCG-MCNC: 4.7 G/DL (ref 3.5–5.2)
ALP SERPL-CCNC: 48 U/L (ref 40–150)
ALT SERPL W P-5'-P-CCNC: 16 U/L (ref 0–70)
ANION GAP SERPL CALCULATED.3IONS-SCNC: 10 MMOL/L (ref 7–15)
AST SERPL W P-5'-P-CCNC: 20 U/L (ref 0–45)
BILIRUB SERPL-MCNC: 0.4 MG/DL
BUN SERPL-MCNC: 8.3 MG/DL (ref 8–23)
CALCIUM SERPL-MCNC: 9.3 MG/DL (ref 8.8–10.4)
CHLORIDE SERPL-SCNC: 100 MMOL/L (ref 98–107)
CHOLEST SERPL-MCNC: 145 MG/DL
CREAT SERPL-MCNC: 0.98 MG/DL (ref 0.67–1.17)
EGFRCR SERPLBLD CKD-EPI 2021: 86 ML/MIN/1.73M2
FASTING STATUS PATIENT QL REPORTED: YES
FASTING STATUS PATIENT QL REPORTED: YES
GLUCOSE SERPL-MCNC: 104 MG/DL (ref 70–99)
HCO3 SERPL-SCNC: 26 MMOL/L (ref 22–29)
HDLC SERPL-MCNC: 51 MG/DL
LDLC SERPL CALC-MCNC: 77 MG/DL
NONHDLC SERPL-MCNC: 94 MG/DL
POTASSIUM SERPL-SCNC: 4.7 MMOL/L (ref 3.4–5.3)
PROT SERPL-MCNC: 7 G/DL (ref 6.4–8.3)
PSA SERPL DL<=0.01 NG/ML-MCNC: 0.48 NG/ML (ref 0–4.5)
SHBG SERPL-SCNC: 31 NMOL/L (ref 11–80)
SODIUM SERPL-SCNC: 136 MMOL/L (ref 135–145)
TRIGL SERPL-MCNC: 87 MG/DL

## 2025-04-30 PROCEDURE — 84270 ASSAY OF SEX HORMONE GLOBUL: CPT | Mod: ORL | Performed by: FAMILY MEDICINE

## 2025-04-30 PROCEDURE — 84403 ASSAY OF TOTAL TESTOSTERONE: CPT | Mod: ORL | Performed by: FAMILY MEDICINE

## 2025-04-30 PROCEDURE — G0103 PSA SCREENING: HCPCS | Mod: ORL | Performed by: FAMILY MEDICINE

## 2025-04-30 PROCEDURE — 80061 LIPID PANEL: CPT | Mod: ORL | Performed by: FAMILY MEDICINE

## 2025-04-30 PROCEDURE — 80053 COMPREHEN METABOLIC PANEL: CPT | Mod: ORL | Performed by: FAMILY MEDICINE

## 2025-05-02 LAB
TESTOST FREE SERPL-MCNC: 8.27 NG/DL
TESTOST SERPL-MCNC: 393 NG/DL (ref 240–950)

## 2025-08-04 ENCOUNTER — VIRTUAL VISIT (OUTPATIENT)
Dept: PULMONOLOGY | Facility: CLINIC | Age: 65
End: 2025-08-04
Attending: INTERNAL MEDICINE
Payer: COMMERCIAL

## 2025-08-04 DIAGNOSIS — J44.9 CHRONIC OBSTRUCTIVE PULMONARY DISEASE, UNSPECIFIED COPD TYPE (H): ICD-10-CM

## 2025-08-04 DIAGNOSIS — C34.12 MALIGNANT NEOPLASM OF UPPER LOBE OF LEFT LUNG (H): Primary | ICD-10-CM

## 2025-08-04 DIAGNOSIS — J98.11 ATELECTASIS, LEFT: ICD-10-CM

## 2025-08-04 DIAGNOSIS — R91.8 PULMONARY NODULES: ICD-10-CM

## 2025-08-04 DIAGNOSIS — C34.00 MALIGNANT NEOPLASM OF HILUS OF LUNG, UNSPECIFIED LATERALITY (H): ICD-10-CM

## 2025-08-04 PROCEDURE — 98004 SYNCH AUDIO-VIDEO EST SF 10: CPT | Performed by: INTERNAL MEDICINE

## (undated) DEVICE — LUBRICANT INST KIT ENDO-LUBE 220-90

## (undated) DEVICE — SU ETHIBOND 5 LRDA 30" B499T

## (undated) DEVICE — STPL RELOAD REG TISSUE ECHELON 45 X 3.6MM BLUE GST45B

## (undated) DEVICE — SUCTION MANIFOLD NEPTUNE 2 SYS 4 PORT 0702-020-000

## (undated) DEVICE — ENDO VALVE BX EVIS MAJ-210

## (undated) DEVICE — Device

## (undated) DEVICE — SPONGE LAP 18X18" X8435

## (undated) DEVICE — CLIP ENDO HEMO-LOC PURPLE LG 544240

## (undated) DEVICE — ESU GROUND PAD ADULT W/CORD E7507

## (undated) DEVICE — VESSEL LOOPS BLUE SUPERMAXI 011022PBX

## (undated) DEVICE — TUBING SUCTION 10'X3/16" N510

## (undated) DEVICE — SPONGE KITTNER 30-101

## (undated) DEVICE — SU PROLENE 4-0 SHDA 36" 8521H

## (undated) DEVICE — SU VICRYL 2-0 SH 27" UND J417H

## (undated) DEVICE — ENDO SCOPE WARMER SEAL  C3101

## (undated) DEVICE — SUCTION DRY CHEST DRAIN OASIS 3600-100

## (undated) DEVICE — RX SURGIFLO HEMOSTATIC MATRIX W/THROMBIN 8ML 2994

## (undated) DEVICE — SPONGE TONSIL W/STRING MED 23275-680

## (undated) DEVICE — DRAPE IOBAN INCISE 23X17" 6650EZ

## (undated) DEVICE — SU VICRYL 2-0 CT-1 27" UND J259H

## (undated) DEVICE — LINEN GOWN XLG 5407

## (undated) DEVICE — TIES BANDING T50R

## (undated) DEVICE — STPL ENDO ARTICULATING 45MM EC45A

## (undated) DEVICE — DRSG MEDIPORE 3 1/2X13 3/4" 3573

## (undated) DEVICE — ESU ELEC BLADE 6" COATED/INSULATED E1455-6

## (undated) DEVICE — SYR 30ML LL W/O NDL 302832

## (undated) DEVICE — SU VICRYL 4-0 PS-2 18" UND J496H

## (undated) DEVICE — DRAIN CHEST TUBE 24FR STR 8024

## (undated) DEVICE — SU VICRYL 3-0 SH 27" UND J416H

## (undated) DEVICE — SPONGE RAY-TEC 4X8" 7318

## (undated) DEVICE — GLOVE BIOGEL PI MICRO SZ 8.0 48580

## (undated) DEVICE — ENDO VALVE SUCTION BRONCH EVIS MAJ-209

## (undated) DEVICE — PREP CHLORAPREP 26ML TINTED HI-LITE ORANGE 930815

## (undated) DEVICE — SU SILK 2-0 SH 30" K833H

## (undated) DEVICE — SU VICRYL 3-0 SH 27" J316H

## (undated) DEVICE — SU VICRYL 0 CT-1 27" UND J260H

## (undated) DEVICE — CATH PROBE CRYOABLATION MALL FLEX 8MM BALL 180DEG CRYOS

## (undated) DEVICE — CLIP ENDO HEMO-LOC GREEN MED/LG 544230

## (undated) DEVICE — LINEN TOWEL PACK X30 5481

## (undated) DEVICE — ESU ELEC BLADE 2.75" COATED/INSULATED E1455

## (undated) DEVICE — SU VICRYL 0 UR-6 27" J603H

## (undated) DEVICE — TRAY CATH SURESTEP OD14 FR INTERMITTENT STER LTX INTS14

## (undated) DEVICE — SURGICEL HEMOSTAT 4X8" 1952

## (undated) DEVICE — LINEN TOWEL PACK X6 WHITE 5487

## (undated) DEVICE — ESU LIGASURE MARYLAND LAPAROSCOPIC SLR/DVDR 5MMX37CM LF1937

## (undated) DEVICE — GLOVE BIOGEL PI MICRO INDICATOR UNDERGLOVE SZ 8.5 48985

## (undated) DEVICE — SU PLEDGET SOFT TFE 3/8"X3/26"X1/16" PCP40

## (undated) DEVICE — SUCTION TIP YANKAUER STR K87

## (undated) DEVICE — SU SILK 0 SH 30" K834H

## (undated) RX ORDER — FENTANYL CITRATE 50 UG/ML
INJECTION, SOLUTION INTRAMUSCULAR; INTRAVENOUS
Status: DISPENSED
Start: 2023-10-02

## (undated) RX ORDER — ACETAMINOPHEN 325 MG/1
TABLET ORAL
Status: DISPENSED
Start: 2023-10-02

## (undated) RX ORDER — HYDROMORPHONE HYDROCHLORIDE 1 MG/ML
INJECTION, SOLUTION INTRAMUSCULAR; INTRAVENOUS; SUBCUTANEOUS
Status: DISPENSED
Start: 2023-10-02

## (undated) RX ORDER — HYDROMORPHONE HCL IN WATER/PF 6 MG/30 ML
PATIENT CONTROLLED ANALGESIA SYRINGE INTRAVENOUS
Status: DISPENSED
Start: 2023-10-02

## (undated) RX ORDER — SODIUM CHLORIDE, SODIUM LACTATE, POTASSIUM CHLORIDE, CALCIUM CHLORIDE 600; 310; 30; 20 MG/100ML; MG/100ML; MG/100ML; MG/100ML
INJECTION, SOLUTION INTRAVENOUS
Status: DISPENSED
Start: 2023-10-02

## (undated) RX ORDER — OXYCODONE HYDROCHLORIDE 5 MG/1
TABLET ORAL
Status: DISPENSED
Start: 2023-10-02

## (undated) RX ORDER — BUPIVACAINE HYDROCHLORIDE AND EPINEPHRINE 2.5; 5 MG/ML; UG/ML
INJECTION, SOLUTION EPIDURAL; INFILTRATION; INTRACAUDAL; PERINEURAL
Status: DISPENSED
Start: 2023-10-02

## (undated) RX ORDER — EPHEDRINE SULFATE 50 MG/ML
INJECTION, SOLUTION INTRAMUSCULAR; INTRAVENOUS; SUBCUTANEOUS
Status: DISPENSED
Start: 2023-10-02

## (undated) RX ORDER — CEFAZOLIN SODIUM/WATER 2 G/20 ML
SYRINGE (ML) INTRAVENOUS
Status: DISPENSED
Start: 2023-10-02

## (undated) RX ORDER — OXYCODONE HYDROCHLORIDE 10 MG/1
TABLET ORAL
Status: DISPENSED
Start: 2023-10-02

## (undated) RX ORDER — ENOXAPARIN SODIUM 100 MG/ML
INJECTION SUBCUTANEOUS
Status: DISPENSED
Start: 2023-10-02

## (undated) RX ORDER — CELECOXIB 200 MG/1
CAPSULE ORAL
Status: DISPENSED
Start: 2023-10-02

## (undated) RX ORDER — CEFAZOLIN SODIUM 1 G/3ML
INJECTION, POWDER, FOR SOLUTION INTRAMUSCULAR; INTRAVENOUS
Status: DISPENSED
Start: 2023-10-02

## (undated) RX ORDER — CHLORHEXIDINE GLUCONATE ORAL RINSE 1.2 MG/ML
SOLUTION DENTAL
Status: DISPENSED
Start: 2023-10-02